# Patient Record
Sex: FEMALE | Race: WHITE | Employment: FULL TIME | URBAN - METROPOLITAN AREA
[De-identification: names, ages, dates, MRNs, and addresses within clinical notes are randomized per-mention and may not be internally consistent; named-entity substitution may affect disease eponyms.]

---

## 2017-01-12 ENCOUNTER — TELEPHONE (OUTPATIENT)
Dept: FAMILY MEDICINE CLINIC | Facility: CLINIC | Age: 42
End: 2017-01-12

## 2017-01-12 NOTE — TELEPHONE ENCOUNTER
Refill was sent yesterday,  Is there a problem with the refill? Pt only has 1 pill left. Please advise .

## 2017-01-13 ENCOUNTER — TELEPHONE (OUTPATIENT)
Dept: FAMILY MEDICINE CLINIC | Facility: CLINIC | Age: 42
End: 2017-01-13

## 2017-01-13 RX ORDER — OLMESARTAN MEDOXOMIL-HYDROCHLOROTHIAZIDE 20; 12.5 MG/1; MG/1
TABLET, FILM COATED ORAL
Qty: 30 TABLET | Refills: 0 | Status: SHIPPED | OUTPATIENT
Start: 2017-01-13 | End: 2017-01-13 | Stop reason: ALTCHOICE

## 2017-01-13 RX ORDER — IRBESARTAN AND HYDROCHLOROTHIAZIDE 150; 12.5 MG/1; MG/1
1 TABLET, FILM COATED ORAL DAILY
Qty: 30 TABLET | Refills: 0 | Status: SHIPPED | OUTPATIENT
Start: 2017-01-13 | End: 2017-02-10

## 2017-01-13 NOTE — TELEPHONE ENCOUNTER
Mack tried faxing 2 x, won't go thru. Would like us to confirm new script. Currently given trial supply of:  Benicar, 20/12.5 - is no longer covered by ins.   Alternative are:   Losartan/hct or Irbesartan/hct or Candesartan/hct - these are covered by

## 2017-01-13 NOTE — TELEPHONE ENCOUNTER
Per Dr. Anish Bruce, will change Rx to Irbesartan-Hydrochlorothiazide 150-12.5 MG Oral Tab    New Rx to pharmacy    Pt advised she needs to F/U in 30 days to see how well B/P is controlled with new medication    Pt understands and will call back to schedule ty

## 2017-01-13 NOTE — TELEPHONE ENCOUNTER
No future appointments.     LOV 10/16     LAST LAB 3/15    LAST RX   BENICAR HCT 20-12.5 MG Oral Tab (Discontinued) 30 tablet 0 8/9/2016       PROTOCOL  Hypertension Medications Protocol Failed1/11 7:54 PM   CMP or BMP in past 12 months        Patient was a

## 2017-01-16 RX ORDER — IRBESARTAN AND HYDROCHLOROTHIAZIDE 150; 12.5 MG/1; MG/1
TABLET, FILM COATED ORAL
Qty: 90 TABLET | Refills: 0 | OUTPATIENT
Start: 2017-01-16

## 2017-02-07 LAB
% SATURATION: 11 % (CALC) (ref 11–50)
ALBUMIN,SERUM: 4 G/DL (ref 3.6–5.1)
ALBUMIN/GLOBULIN RATIO: 1.4 (CALC) (ref 1–2.5)
ALBUMIN: 4.1 G/DL (ref 3.6–5.1)
ALKALINE PHOSPHATASE: 70 U/L (ref 33–115)
ALT: 19 U/L (ref 6–29)
AST: 18 U/L (ref 10–30)
BILIRUBIN, TOTAL: 0.5 MG/DL (ref 0.2–1.2)
BUN: 14 MG/DL (ref 7–25)
CALCIUM: 9.2 MG/DL (ref 8.6–10.2)
CARBON DIOXIDE: 26 MMOL/L (ref 20–31)
CHLORIDE: 105 MMOL/L (ref 98–110)
CHOL/HDLC RATIO: 4.6 (CALC)
CHOLESTEROL, TOTAL: 152 MG/DL (ref 125–200)
CREATININE: 0.58 MG/DL (ref 0.5–1.1)
DHEA SULFATE: 89 MCG/DL (ref 19–231)
EGFR IF AFRICN AM: 131 ML/MIN/1.73M2
EGFR IF NONAFRICN AM: 113 ML/MIN/1.73M2
GLOBULIN: 2.9 G/DL (CALC) (ref 1.9–3.7)
GLUCOSE: 92 MG/DL (ref 65–99)
HDL CHOLESTEROL: 33 MG/DL
HEMOGLOBIN A1C: 6 % OF TOTAL HGB
IRON BINDING CAPACITY: 412 MCG/DL (CALC) (ref 250–450)
IRON, TOTAL: 44 MCG/DL (ref 40–190)
LDL-CHOLESTEROL: 71 MG/DL (CALC)
NON-HDL CHOLESTEROL: 119 MG/DL (CALC)
POTASSIUM: 4.1 MMOL/L (ref 3.5–5.3)
PROTEIN, TOTAL: 7 G/DL (ref 6.1–8.1)
SEX HORMONE BINDING$GLOBULIN: 38 NMOL/L (ref 17–124)
SODIUM: 138 MMOL/L (ref 135–146)
TESTOSTERONE, FREE: 2.3 PG/ML (ref 0.2–5)
TESTOSTERONE, TOTAL,$/MS/MS: 22 NG/DL (ref 2–45)
TESTOSTERONE,BIOAVAILABLE: 4.3 NG/DL (ref 0.5–8.5)
TRIGLYCERIDES: 238 MG/DL
TSH: 1.85 MIU/L

## 2017-02-10 ENCOUNTER — OFFICE VISIT (OUTPATIENT)
Dept: FAMILY MEDICINE CLINIC | Facility: CLINIC | Age: 42
End: 2017-02-10

## 2017-02-10 VITALS — SYSTOLIC BLOOD PRESSURE: 122 MMHG | OXYGEN SATURATION: 98 % | HEART RATE: 103 BPM | DIASTOLIC BLOOD PRESSURE: 78 MMHG

## 2017-02-10 DIAGNOSIS — R73.03 PREDIABETES: ICD-10-CM

## 2017-02-10 DIAGNOSIS — I10 ESSENTIAL HYPERTENSION: ICD-10-CM

## 2017-02-10 DIAGNOSIS — J02.9 ACUTE PHARYNGITIS, UNSPECIFIED ETIOLOGY: Primary | ICD-10-CM

## 2017-02-10 LAB — CONTROL LINE PRESENT WITH A CLEAR BACKGROUND (YES/NO): YES YES/NO

## 2017-02-10 PROCEDURE — 99213 OFFICE O/P EST LOW 20 MIN: CPT | Performed by: FAMILY MEDICINE

## 2017-02-10 RX ORDER — IRBESARTAN AND HYDROCHLOROTHIAZIDE 150; 12.5 MG/1; MG/1
1 TABLET, FILM COATED ORAL DAILY
Qty: 90 TABLET | Refills: 1 | Status: SHIPPED | OUTPATIENT
Start: 2017-02-10 | End: 2017-08-21

## 2017-02-10 RX ORDER — NEOMYCIN SULFATE, POLYMYXIN B SULFATE AND DEXAMETHASONE 3.5; 10000; 1 MG/ML; [USP'U]/ML; MG/ML
SUSPENSION/ DROPS OPHTHALMIC
Refills: 1 | COMMUNITY
Start: 2017-01-05 | End: 2017-09-14 | Stop reason: ALTCHOICE

## 2017-02-10 NOTE — PATIENT INSTRUCTIONS
Strep test for sore throat. For sore throat, chloraseptic spray every 2-3 hours can be used for relief. You can gargle with warm salt water or with 81 mg tablet of aspirin dissolved in warm water.  Tylenol 650 mg every 4 to 6 hours or ibuprofen 200 to 400 m

## 2017-02-10 NOTE — PROGRESS NOTES
Agata Gregory IS A 35year old female HERE FOR Patient presents with:  Medication Request: refill  Lab Results: discuss results  Sore Throat: s/s since AM, flu-like s/s       History of present illness:     Awoke with right side of throat hurting, some s Ophthalmic Solution Place 1 drop into both eyes 4 (four) times daily. Disp: 5 mL Rfl: 1   Mometasone Furoate 0.1 % External Solution Apply 1 Application topically daily.  Disp: 60 mL Rfl: 1       Allergy:        Codeine                 Rash  Enbrel no edema. Test results: reviewed with patient.      Orders Only on 02/03/2017  CHOLESTEROL, TOTAL Value: 152(mg/dL)  Date: 02/03/2017   HDL CHOLESTEROL Value: 33(mg/dL)* Date: 02/03/2017   TRIGLYCERIDES Value: 238(mg/dL)* Date: 02/03/2017   LDL-CHOLESTER BINDING$GLOB* Value: 38(nmol/L)  Date: 02/03/2017   Jairo Salts Value: 4.0(g/dL)  Date: 02/03/2017           Assessment & Plan:   There are no diagnoses linked to this encounter. Patient Instructions   Omron brand BP cuffs are usually more accurate.

## 2017-04-05 ENCOUNTER — TELEPHONE (OUTPATIENT)
Dept: FAMILY MEDICINE CLINIC | Facility: CLINIC | Age: 42
End: 2017-04-05

## 2017-04-05 DIAGNOSIS — K76.0 NONALCOHOLIC FATTY LIVER DISEASE: Primary | ICD-10-CM

## 2017-04-05 NOTE — TELEPHONE ENCOUNTER
Needs Out of Network referral for annual liver F/U with Dr Deatrice Lesch consult in Media from 4/2016

## 2017-04-05 NOTE — TELEPHONE ENCOUNTER
[facility]        Cc: [facility] Referral Pool       Phone Number: [phone]                     .Reason for the order/referral:Follow Up Visit   PCP:  Pector   Refer to Provider: Abbey Cervantes

## 2017-04-14 NOTE — TELEPHONE ENCOUNTER
Call received from Maggie Hendricks at [facility] now has in network liver specialist  Dr. Damien Schneider    They will allow this OV on Monday only and pt will have to transition to new specialists      Telephone Information:   Mobile [phone]     Pt Lina Mckeon

## 2017-05-19 ENCOUNTER — TELEPHONE (OUTPATIENT)
Dept: FAMILY MEDICINE CLINIC | Facility: CLINIC | Age: 42
End: 2017-05-19

## 2017-05-19 DIAGNOSIS — K76.0 NON-ALCOHOLIC FATTY LIVER DISEASE: ICD-10-CM

## 2017-05-19 DIAGNOSIS — L40.9 GENERALIZED PSORIASIS: Primary | ICD-10-CM

## 2017-05-19 NOTE — TELEPHONE ENCOUNTER
Patient Name: Mark Diaz   : 71   Reason for the order/referral: f/u   PCP: Francesca Wellerr   Refer to Provider (first and last name): Dr. Rl Villa   Specialty: Derm   Patient Insurance: Payor: [facility] BL/LATIA / Plan: Jarred Sierra / Loli Mcfarland

## 2017-06-09 ENCOUNTER — TELEPHONE (OUTPATIENT)
Dept: FAMILY MEDICINE CLINIC | Facility: CLINIC | Age: 42
End: 2017-06-09

## 2017-06-29 ENCOUNTER — OFFICE VISIT (OUTPATIENT)
Dept: FAMILY MEDICINE CLINIC | Facility: CLINIC | Age: 42
End: 2017-06-29

## 2017-06-29 VITALS
SYSTOLIC BLOOD PRESSURE: 122 MMHG | HEART RATE: 95 BPM | OXYGEN SATURATION: 97 % | WEIGHT: 220 LBS | DIASTOLIC BLOOD PRESSURE: 84 MMHG | BODY MASS INDEX: 42 KG/M2 | RESPIRATION RATE: 18 BRPM

## 2017-06-29 DIAGNOSIS — N80.9 ENDOMETRIOSIS: ICD-10-CM

## 2017-06-29 DIAGNOSIS — N63.20 LEFT BREAST MASS: Primary | ICD-10-CM

## 2017-06-29 PROCEDURE — 99213 OFFICE O/P EST LOW 20 MIN: CPT | Performed by: FAMILY MEDICINE

## 2017-06-29 NOTE — PATIENT INSTRUCTIONS
Mammogram & breast ultrasound for left breast soreness. This is probably either a benign cyst, or less likely a bruise from some type of trauma. For endometriosis: follow up with Dr. Juan Haque.    Low-dose birth control pill 20 to 25 mcg would be an opt

## 2017-06-29 NOTE — PROGRESS NOTES
Ksenia Gregory IS A 38year old female HERE FOR Patient presents with:  Breast Lump: Left breast for 2 days  Breast Pain: per pt, size of an almond       History of present illness:     [place]-sized lump noted with tenderness L upper inner breast x 2 days Mometasone Furoate 0.1 % External Solution Apply 1 Application topically daily.  Disp: 60 mL Rfl: 1       Allergy:        Codeine                 Rash  Enbrel                  Rash  Seafood                 Swelling    Comment:Angioedema    Family history: was seen today for breast lump and breast pain. Diagnoses and all orders for this visit:    Left breast mass  Comments:  upper inner about 11:00  Orders:  -     [facility] DIAGNOSTIC BILATERAL (CPT=77066); Future  -     US BREAST LEFT LIMITED (QMT=26489);  Futu

## 2017-07-13 ENCOUNTER — HOSPITAL ENCOUNTER (OUTPATIENT)
Dept: MAMMOGRAPHY | Age: 42
Discharge: HOME | End: 2017-07-13
Attending: FAMILY MEDICINE
Payer: COMMERCIAL

## 2017-07-13 ENCOUNTER — HOSPITAL ENCOUNTER (OUTPATIENT)
Dept: ULTRASOUND IMAGING | Age: 42
Discharge: HOME | End: 2017-07-13
Attending: FAMILY MEDICINE
Payer: COMMERCIAL

## 2017-07-13 DIAGNOSIS — N63.20 LEFT BREAST MASS: ICD-10-CM

## 2017-07-13 PROCEDURE — 77062 BREAST TOMOSYNTHESIS BI: CPT | Performed by: FAMILY MEDICINE

## 2017-07-13 PROCEDURE — 76642 ULTRASOUND BREAST LIMITED: CPT | Performed by: FAMILY MEDICINE

## 2017-07-13 PROCEDURE — 77066 DX MAMMO INCL CAD BI: CPT | Performed by: FAMILY MEDICINE

## 2017-08-10 ENCOUNTER — TELEPHONE (OUTPATIENT)
Dept: FAMILY MEDICINE CLINIC | Facility: CLINIC | Age: 42
End: 2017-08-10

## 2017-08-10 ENCOUNTER — OFFICE VISIT (OUTPATIENT)
Dept: FAMILY MEDICINE CLINIC | Facility: CLINIC | Age: 42
End: 2017-08-10

## 2017-08-10 VITALS
SYSTOLIC BLOOD PRESSURE: 128 MMHG | TEMPERATURE: 99 F | RESPIRATION RATE: 12 BRPM | OXYGEN SATURATION: 96 % | HEART RATE: 80 BPM | DIASTOLIC BLOOD PRESSURE: 70 MMHG

## 2017-08-10 DIAGNOSIS — R10.9 FLANK PAIN: Primary | ICD-10-CM

## 2017-08-10 DIAGNOSIS — M62.838 MUSCLE SPASM OF LEFT SHOULDER: ICD-10-CM

## 2017-08-10 LAB
BILIRUBIN: 0
GLUCOSE (URINE DIPSTICK): 0 MG/DL
KETONES (URINE DIPSTICK): 0 MG/DL
LEUKOCYTES: 0
MULTISTIX LOT#: NORMAL NUMERIC
NITRITE, URINE: 0
OCCULT BLOOD: 0
PH, URINE: 6 (ref 4.5–8)
PROTEIN (URINE DIPSTICK): 0 MG/DL
SPECIFIC GRAVITY: 1.02 (ref 1–1.03)
UROBILINOGEN,SEMI-QN: 0.2 MG/DL (ref 0–1.9)

## 2017-08-10 PROCEDURE — 99213 OFFICE O/P EST LOW 20 MIN: CPT | Performed by: FAMILY MEDICINE

## 2017-08-10 PROCEDURE — 81003 URINALYSIS AUTO W/O SCOPE: CPT | Performed by: FAMILY MEDICINE

## 2017-08-10 RX ORDER — OMEGA-3 FATTY ACIDS/FISH OIL 300-1000MG
200 CAPSULE ORAL 2 TIMES DAILY PRN
COMMUNITY
End: 2020-02-21

## 2017-08-10 NOTE — PROGRESS NOTES
Aubree Gregory IS A 38year old female HERE FOR Patient presents with:  Flank Pain: left flank since yesterday AM-constant. did have a time with a break 6PM lat night till 0100, returned and constant since.  no other symptoms  Breathing Problem: is affecte Irbesartan-Hydrochlorothiazide 150-12.5 MG Oral Tab Take 1 tablet by mouth daily. Disp: 90 tablet Rfl: 1   Mometasone Furoate 0.1 % External Solution Apply 1 Application topically daily. Disp: 60 mL Rfl: 1   BIOTIN OR Take 1 capsule by mouth.  Disp:  Rfl: lower several ribs. Abdomen nontender, no percussion tenderness or apparent splenomegaly. Test results: UA normal.      Assessment & Plan:   Shari Lee was seen today for flank pain and breathing problem.     Diagnoses and all orders for this visit:

## 2017-08-10 NOTE — TELEPHONE ENCOUNTER
Patient has Left side pain  under rib cage. No cough afebrile. Started yesterday in the morning comes and goes. SOB with the pain but not emergency. Patient has no leg pain at all.    Future Appointments  Date Time Provider Fe Patel   8/10/2017

## 2017-08-10 NOTE — PATIENT INSTRUCTIONS
Ibuprofen 2-3 pills 3 times daily as needed for pain. The pain could be muscular. Heat can help. Salicylic acid containing products can help. (Kayy or similar)    famotidine 20 mg daily could help if you have any reflux symptoms.  (over the counter Pepci

## 2017-08-10 NOTE — TELEPHONE ENCOUNTER
Pt called and stated she has pain in left thigh. Scheduled Appt. Then at that time pt stated new problem - she is also having trouble breathing and has a 1pm dental appt and should she be going to the dentist if having difficulty breathing?   Said she i

## 2017-08-21 NOTE — TELEPHONE ENCOUNTER
**LEFT VOICE MAIL MESSAGE AT FRONT OFFICE**  Have not spoke to pt. Stated she drove 40 minutes from work and no one had called her to explain we had not filled her Refill. Thought she also had 90 tablets left and Mack is saying no. Pls call.

## 2017-08-22 RX ORDER — IRBESARTAN AND HYDROCHLOROTHIAZIDE 150; 12.5 MG/1; MG/1
TABLET, FILM COATED ORAL
Qty: 90 TABLET | Refills: 0 | OUTPATIENT
Start: 2017-08-22

## 2017-08-22 RX ORDER — IRBESARTAN AND HYDROCHLOROTHIAZIDE 150; 12.5 MG/1; MG/1
1 TABLET, FILM COATED ORAL DAILY
Qty: 30 TABLET | Refills: 0 | Status: SHIPPED | OUTPATIENT
Start: 2017-08-22 | End: 2017-09-09

## 2017-08-22 NOTE — TELEPHONE ENCOUNTER
Patient states she has 1 pill left. She thinks there may be a discrepancy between Dr Ming Barfield and Mack with how many she should have on file.   ~ Patient is aware we have 24-48 hours to refill.

## 2017-08-22 NOTE — TELEPHONE ENCOUNTER
No future appointments.     LOV 2/17 acute later    LAST LAB 2/17    LAST RX   Irbesartan-Hydrochlorothiazide 150-12.5 MG Oral Tab 90 tablet 1 2/10/2017         PROTOCOL  Hypertension Medications Protocol Passed8/22 8:40    Refilled 30 days needs appointmen

## 2017-08-22 NOTE — TELEPHONE ENCOUNTER
Pt recently seen for acute, should schedule for med refills/followup. Please schedule. We gave her 1 month of medication.

## 2017-09-09 ENCOUNTER — OFFICE VISIT (OUTPATIENT)
Dept: FAMILY MEDICINE CLINIC | Facility: CLINIC | Age: 42
End: 2017-09-09

## 2017-09-09 VITALS
HEART RATE: 93 BPM | TEMPERATURE: 98 F | SYSTOLIC BLOOD PRESSURE: 116 MMHG | WEIGHT: 219.63 LBS | RESPIRATION RATE: 16 BRPM | DIASTOLIC BLOOD PRESSURE: 80 MMHG | HEIGHT: 61 IN | BODY MASS INDEX: 41.47 KG/M2 | OXYGEN SATURATION: 97 %

## 2017-09-09 DIAGNOSIS — J98.01 ACUTE BRONCHOSPASM: Primary | ICD-10-CM

## 2017-09-09 DIAGNOSIS — E78.5 DYSLIPIDEMIA: ICD-10-CM

## 2017-09-09 DIAGNOSIS — R73.01 IMPAIRED FASTING GLUCOSE: ICD-10-CM

## 2017-09-09 DIAGNOSIS — I10 ESSENTIAL HYPERTENSION: ICD-10-CM

## 2017-09-09 PROBLEM — R06.00 DYSPNEA: Status: ACTIVE | Noted: 2017-09-09

## 2017-09-09 PROCEDURE — 99214 OFFICE O/P EST MOD 30 MIN: CPT | Performed by: FAMILY MEDICINE

## 2017-09-09 RX ORDER — IRBESARTAN AND HYDROCHLOROTHIAZIDE 150; 12.5 MG/1; MG/1
1 TABLET, FILM COATED ORAL DAILY
Qty: 90 TABLET | Refills: 1 | Status: SHIPPED | OUTPATIENT
Start: 2017-09-09 | End: 2018-02-23

## 2017-09-09 RX ORDER — OMEPRAZOLE 20 MG/1
20 CAPSULE, DELAYED RELEASE ORAL
Qty: 30 CAPSULE | Refills: 1 | Status: SHIPPED | OUTPATIENT
Start: 2017-09-09 | End: 2017-09-14

## 2017-09-09 RX ORDER — ALBUTEROL SULFATE 90 UG/1
2 AEROSOL, METERED RESPIRATORY (INHALATION) EVERY 4 HOURS PRN
Qty: 1 INHALER | Refills: 0 | Status: SHIPPED | OUTPATIENT
Start: 2017-09-09 | End: 2017-09-19 | Stop reason: ALTCHOICE

## 2017-09-09 NOTE — PROGRESS NOTES
Darrick Gregory IS A 38year old female HERE FOR Patient presents with:  Shortness Of Breath: SOB when going up and down stairs or any physical activity       History of present illness:     C/o pain in chest when drinking Crystal light.  Dyspnea on stairs Inhalation Aero Soln Inhale 2 puffs into the lungs every 4 (four) hours as needed for Wheezing. Disp: 1 Inhaler Rfl: 0   Irbesartan-Hydrochlorothiazide 150-12.5 MG Oral Tab Take 1 tablet by mouth daily.  Disp: 90 tablet Rfl: 1   Ibuprofen (ADVIL) 200 MG Ora similar symptoms. Took med for GERD at that time and the increased belching/burping improved after a month.      Exam:     /80 (BP Location: Left arm, Patient Position: Sitting, Cuff Size: large)   Pulse 93   Temp 98.4 °F (36.9 °C) (Oral)   Resp 16

## 2017-09-09 NOTE — PATIENT INSTRUCTIONS
Peak flow under 320 after Crystal light may indicate asthma. Call if you are getting readings under that and then fill albuterol inhaler rx. Omeprazole 20 mg (generic Prilosec) over the counter daily for 4 weeks. That would help for burping/belching.

## 2017-09-11 ENCOUNTER — APPOINTMENT (OUTPATIENT)
Dept: CT IMAGING | Facility: HOSPITAL | Age: 42
End: 2017-09-11
Attending: EMERGENCY MEDICINE
Payer: COMMERCIAL

## 2017-09-11 ENCOUNTER — HOSPITAL ENCOUNTER (EMERGENCY)
Facility: HOSPITAL | Age: 42
Discharge: HOME | End: 2017-09-11
Attending: EMERGENCY MEDICINE
Payer: COMMERCIAL

## 2017-09-11 ENCOUNTER — HOSPITAL ENCOUNTER (OUTPATIENT)
Age: 42
Discharge: ANOTHER HOSPITAL | End: 2017-09-11
Attending: FAMILY MEDICINE
Payer: COMMERCIAL

## 2017-09-11 ENCOUNTER — TELEPHONE (OUTPATIENT)
Dept: FAMILY MEDICINE CLINIC | Facility: CLINIC | Age: 42
End: 2017-09-11

## 2017-09-11 VITALS
OXYGEN SATURATION: 99 % | HEART RATE: 88 BPM | TEMPERATURE: 98 F | DIASTOLIC BLOOD PRESSURE: 79 MMHG | SYSTOLIC BLOOD PRESSURE: 148 MMHG | RESPIRATION RATE: 18 BRPM

## 2017-09-11 VITALS
DIASTOLIC BLOOD PRESSURE: 75 MMHG | RESPIRATION RATE: 18 BRPM | BODY MASS INDEX: 40.4 KG/M2 | SYSTOLIC BLOOD PRESSURE: 145 MMHG | WEIGHT: 219.56 LBS | HEART RATE: 86 BPM | OXYGEN SATURATION: 97 % | HEIGHT: 62 IN

## 2017-09-11 DIAGNOSIS — R94.31 ABNORMAL EKG: ICD-10-CM

## 2017-09-11 DIAGNOSIS — R06.00 DYSPNEA ON EXERTION: Primary | ICD-10-CM

## 2017-09-11 DIAGNOSIS — R42 DIZZINESS: ICD-10-CM

## 2017-09-11 LAB
APTT PPP: 27.7 SECONDS (ref 25–34)
BASOPHILS # BLD AUTO: 0.05 X10(3) UL (ref 0–0.1)
BASOPHILS NFR BLD AUTO: 0.7 %
BUN BLD-MCNC: 22 MG/DL (ref 8–20)
CALCIUM BLD-MCNC: 8.6 MG/DL (ref 8.3–10.3)
CHLORIDE: 108 MMOL/L (ref 101–111)
CO2: 24 MMOL/L (ref 22–32)
CREAT BLD-MCNC: 0.53 MG/DL (ref 0.55–1.02)
EOSINOPHIL # BLD AUTO: 0.15 X10(3) UL (ref 0–0.3)
EOSINOPHIL NFR BLD AUTO: 2.1 %
ERYTHROCYTE [DISTWIDTH] IN BLOOD BY AUTOMATED COUNT: 15.9 % (ref 11.5–16)
GLUCOSE BLD-MCNC: 89 MG/DL (ref 70–99)
HCT VFR BLD AUTO: 37 % (ref 34–50)
HGB BLD-MCNC: 11.7 G/DL (ref 12–16)
IMMATURE GRANULOCYTE COUNT: 0.02 X10(3) UL (ref 0–1)
IMMATURE GRANULOCYTE RATIO %: 0.3 %
INR BLD: 1.02 (ref 0.89–1.11)
LYMPHOCYTES # BLD AUTO: 1.54 X10(3) UL (ref 0.9–4)
LYMPHOCYTES NFR BLD AUTO: 21.2 %
MCH RBC QN AUTO: 25.1 PG (ref 27–33.2)
MCHC RBC AUTO-ENTMCNC: 31.6 G/DL (ref 31–37)
MCV RBC AUTO: 79.2 FL (ref 81–100)
MONOCYTES # BLD AUTO: 0.66 X10(3) UL (ref 0.1–0.6)
MONOCYTES NFR BLD AUTO: 9.1 %
NEUTROPHIL ABS PRELIM: 4.83 X10 (3) UL (ref 1.3–6.7)
NEUTROPHILS # BLD AUTO: 4.83 X10(3) UL (ref 1.3–6.7)
NEUTROPHILS NFR BLD AUTO: 66.6 %
PLATELET # BLD AUTO: 373 10(3)UL (ref 150–450)
POTASSIUM SERPL-SCNC: 4.3 MMOL/L (ref 3.6–5.1)
PSA SERPL DL<=0.01 NG/ML-MCNC: 13.4 SECONDS (ref 12–14.3)
RBC # BLD AUTO: 4.67 X10(6)UL (ref 3.8–5.1)
RED CELL DISTRIBUTION WIDTH-SD: 45.9 FL (ref 35.1–46.3)
SODIUM SERPL-SCNC: 142 MMOL/L (ref 136–144)
TROPONIN: <0.046 NG/ML (ref ?–0.05)
WBC # BLD AUTO: 7.3 X10(3) UL (ref 4–13)

## 2017-09-11 PROCEDURE — 99284 EMERGENCY DEPT VISIT MOD MDM: CPT

## 2017-09-11 PROCEDURE — 71275 CT ANGIOGRAPHY CHEST: CPT | Performed by: EMERGENCY MEDICINE

## 2017-09-11 PROCEDURE — 85610 PROTHROMBIN TIME: CPT | Performed by: EMERGENCY MEDICINE

## 2017-09-11 PROCEDURE — 85730 THROMBOPLASTIN TIME PARTIAL: CPT | Performed by: EMERGENCY MEDICINE

## 2017-09-11 PROCEDURE — 80048 BASIC METABOLIC PNL TOTAL CA: CPT | Performed by: EMERGENCY MEDICINE

## 2017-09-11 PROCEDURE — 93010 ELECTROCARDIOGRAM REPORT: CPT

## 2017-09-11 PROCEDURE — 85025 COMPLETE CBC W/AUTO DIFF WBC: CPT | Performed by: EMERGENCY MEDICINE

## 2017-09-11 PROCEDURE — 99205 OFFICE O/P NEW HI 60 MIN: CPT

## 2017-09-11 PROCEDURE — 99215 OFFICE O/P EST HI 40 MIN: CPT

## 2017-09-11 PROCEDURE — 93005 ELECTROCARDIOGRAM TRACING: CPT

## 2017-09-11 PROCEDURE — 84484 ASSAY OF TROPONIN QUANT: CPT | Performed by: EMERGENCY MEDICINE

## 2017-09-11 PROCEDURE — 36415 COLL VENOUS BLD VENIPUNCTURE: CPT

## 2017-09-11 RX ORDER — ASPIRIN 81 MG/1
324 TABLET, CHEWABLE ORAL ONCE
Status: COMPLETED | OUTPATIENT
Start: 2017-09-11 | End: 2017-09-11

## 2017-09-11 RX ORDER — OMEPRAZOLE 20 MG/1
CAPSULE, DELAYED RELEASE ORAL
Qty: 90 CAPSULE | Refills: 1 | OUTPATIENT
Start: 2017-09-11

## 2017-09-11 NOTE — TELEPHONE ENCOUNTER
Patient called back - insisting to speak with someone because of how she feels. Transferred to triage line.

## 2017-09-11 NOTE — ED INITIAL ASSESSMENT (HPI)
Pt comes from immediate care c/o TRIPP and chest pain. To ED for further workup. 12 lead EKG obtained at immediate care.

## 2017-09-11 NOTE — TELEPHONE ENCOUNTER
**LEFT VM AT FRONT OFFICE**    Pt stated she has been feeling \"light headed and kind of foggy, unsure if it is her BP medication\". Asked to speak to Dr Ashley Gotti. Was just here Saturday afternoon.

## 2017-09-11 NOTE — ED NOTES
Pt insists on driving herself to the Ed. Instructed to remain NPO until cleared by Ed. Instructed to go directly to Ed. Pt verbalized understanding.

## 2017-09-11 NOTE — TELEPHONE ENCOUNTER
Spoke to patient Says she is not feeling well. Dizzy Did not get any new RX. Has been on BP for a while. Will be going to IC for an eval. Gave address of IC.

## 2017-09-11 NOTE — ED INITIAL ASSESSMENT (HPI)
Pt states she has been feeling dizzy for 2 days. She feels like she might pass out. Denies feeling the room spin. Pt also reports sob going up stairs. Saw her pmd on Saturday and was prescribed inhaler which she has not yet filled.   Denies nausea, vomi

## 2017-09-11 NOTE — ED PROVIDER NOTES
Patient Seen in: [facility] Emergency Department    History   Patient presents with:  Dyspnea TRIPP SOB (respiratory)    Stated Complaint:     HPI    Jena Isidro is a 78-year-old female presenting to the emergency department for dyspnea and abnormal EKG Smokeless tobacco: Never Used                      Alcohol use: No                Review of Systems    Positive for stated complaint:   Other systems are as noted in HPI. Constitutional and vital signs reviewed.       All other s limits   TROPONIN I - Normal   PTT, ACTIVATED - Normal    Narrative: The aPTT Heparin Therapeutic Range is approximately 65- 104 seconds. The therapeutic range has been validated against 0.3-0.7 heparin anti-Xa units/mL.      PROTHROMBIN TIME (PT) - Nor

## 2017-09-12 ENCOUNTER — TELEPHONE (OUTPATIENT)
Dept: FAMILY MEDICINE CLINIC | Facility: CLINIC | Age: 42
End: 2017-09-12

## 2017-09-12 DIAGNOSIS — R07.9 CHEST PAIN, UNSPECIFIED TYPE: ICD-10-CM

## 2017-09-12 DIAGNOSIS — R06.02 SHORTNESS OF BREATH: Primary | ICD-10-CM

## 2017-09-12 DIAGNOSIS — R94.31 ABNORMAL EKG: ICD-10-CM

## 2017-09-12 NOTE — TELEPHONE ENCOUNTER
Spoke to patient about her dizziness. She says she feels foggy no room spinning lightheaded and foggy. Jarret Mena M.D.   Cardiovascular Disease  [address] [address]  [address]  [place]

## 2017-09-12 NOTE — TELEPHONE ENCOUNTER
ER last night. Pt called to get Referral for Echo & Neurology from Dr. Ming Barfield. Offered an appt tomorrow and pt declined. Pt states she is still feeling very dizzy today. Asked for an Appt Friday.     [facility] so looks like she needs to come in for Ref

## 2017-09-12 NOTE — TELEPHONE ENCOUNTER
Pt was in ER yesterday with persistent dyspnea on exertion and some EKG changes. Saw me for shortness of breath Saturday, I thought it might be asthmatic reaction to aspartame, she gets it with Crystal Light.  ER recommends followup for the dyspnea/EKG rogers

## 2017-09-12 NOTE — TELEPHONE ENCOUNTER
Patient will see Katya Grewal first then f/u with PCP. Nothing about Neuro she will f/u  first with PCP.

## 2017-09-12 NOTE — TELEPHONE ENCOUNTER
\"foggy\" is a really vague symptom; I assume she doesn't feel well, but I don't think neurology will find much without more localized symptoms like headache, trouble walking, talking, coordination, weakness or numbness/tingling on one side of body.  Ferd Holstein

## 2017-09-13 ENCOUNTER — TELEPHONE (OUTPATIENT)
Dept: FAMILY MEDICINE CLINIC | Facility: CLINIC | Age: 42
End: 2017-09-13

## 2017-09-13 ENCOUNTER — TELEPHONE (OUTPATIENT)
Dept: NEUROLOGY | Facility: CLINIC | Age: 42
End: 2017-09-13

## 2017-09-13 DIAGNOSIS — R42 MULTISENSORY DIZZINESS: Primary | ICD-10-CM

## 2017-09-13 NOTE — TELEPHONE ENCOUNTER
Mallory Valente MD          9/12/17 3:25 PM   Note      \"foggy\" is a really vague symptom; I assume she doesn't feel well, but I don't think neurology will find much without more localized symptoms like headache, trouble walking, talking, coordination

## 2017-09-13 NOTE — TELEPHONE ENCOUNTER
Spoke with Dr. Kg Baeza. She is on call this week, and unable to see patients. She states patient can be added on next week. Thursday at 8:20am is the only available time slot to add on to her schedule.   We could also place patient on waiting list for a

## 2017-09-13 NOTE — TELEPHONE ENCOUNTER
Patient asks to speak with Dr Elle Lyn or her nurse in regards to MRI. She states she was at work and had to leave due to extreme dizziness.

## 2017-09-13 NOTE — TELEPHONE ENCOUNTER
Future Appointments  Date Time Provider Fe Amanda   9/14/2017 1:00 PM Cindi Boyd MD [facility] [address]

## 2017-09-14 ENCOUNTER — OFFICE VISIT (OUTPATIENT)
Dept: NEUROLOGY | Facility: CLINIC | Age: 42
End: 2017-09-14

## 2017-09-14 VITALS
DIASTOLIC BLOOD PRESSURE: 88 MMHG | HEART RATE: 78 BPM | SYSTOLIC BLOOD PRESSURE: 122 MMHG | RESPIRATION RATE: 16 BRPM | WEIGHT: 219 LBS | BODY MASS INDEX: 40 KG/M2

## 2017-09-14 DIAGNOSIS — R42 DIZZINESS: Primary | ICD-10-CM

## 2017-09-14 DIAGNOSIS — R41.82 ALTERED MENTAL STATUS, UNSPECIFIED ALTERED MENTAL STATUS TYPE: ICD-10-CM

## 2017-09-14 DIAGNOSIS — R41.89 COGNITIVE CHANGE: ICD-10-CM

## 2017-09-14 PROCEDURE — 99244 OFF/OP CNSLTJ NEW/EST MOD 40: CPT | Performed by: OTHER

## 2017-09-14 NOTE — PROGRESS NOTES
[facility]    Date of consult: 9/14/2017    CC: dizziness, cognitive change    HPI: Korin Prabhakar is a 38year old female with past medical history as listed below presents here for initial evaluation of dizziness and cognitive disorder, not elsewhere classified     consider psych consult to r/o mood disorder   • Endometriosis, site unspecified    • External hemorrhoids without mention of complication    • Gastritis     mild   • JOSE EDUARDO(003.5)    • Helicobacter pylori (H. pylori normal motility, no atrophy  Motor strength: 5/5 all extremities  Tone: normal  DTRs: 2+ symmetric  Plantar response: bilateral flexor  Coordination: Normal FTN  Sensory: symmetric  Gait: nl  Neck: supple    Data Reviewed on 9/14/2017  Notes Reviewed on 9/

## 2017-09-14 NOTE — TELEPHONE ENCOUNTER
OK to request they call me with results ASAP on Friday, I don't think moving it up to Thursday is necessary. We can try to work her in Thursday or Friday to reevaluate her symptoms I know she feels bad.  Her symptoms are hard for her to describe and overlap

## 2017-09-14 NOTE — PATIENT INSTRUCTIONS
Refill policies:    • Allow 2-3 business days for refills; controlled substances may take longer.   • Contact your pharmacy at least 5 days prior to running out of medication and have them send an electronic request or submit request through the [facility] have a procedure or additional testing performed. [facility]) will contact your insurance carrier to obtain pre-certification or prior authorization.     Unfortunately, [facility] has seen an increase in denial of payment even though the p

## 2017-09-15 ENCOUNTER — HOSPITAL ENCOUNTER (OUTPATIENT)
Dept: CV DIAGNOSTICS | Age: 42
Discharge: HOME | End: 2017-09-15
Attending: FAMILY MEDICINE
Payer: COMMERCIAL

## 2017-09-15 DIAGNOSIS — R94.31 ABNORMAL EKG: ICD-10-CM

## 2017-09-15 DIAGNOSIS — R06.02 SHORTNESS OF BREATH: ICD-10-CM

## 2017-09-15 DIAGNOSIS — R07.9 CHEST PAIN, UNSPECIFIED TYPE: ICD-10-CM

## 2017-09-15 PROCEDURE — 93306 TTE W/DOPPLER COMPLETE: CPT | Performed by: FAMILY MEDICINE

## 2017-09-16 ENCOUNTER — TELEPHONE (OUTPATIENT)
Dept: NEUROLOGY | Facility: CLINIC | Age: 42
End: 2017-09-16

## 2017-09-16 ENCOUNTER — HOSPITAL ENCOUNTER (OUTPATIENT)
Dept: MRI IMAGING | Facility: HOSPITAL | Age: 42
Discharge: HOME | End: 2017-09-16
Attending: Other
Payer: COMMERCIAL

## 2017-09-16 DIAGNOSIS — R41.89 COGNITIVE CHANGE: ICD-10-CM

## 2017-09-16 DIAGNOSIS — R42 DIZZINESS: ICD-10-CM

## 2017-09-16 PROCEDURE — 70551 MRI BRAIN STEM W/O DYE: CPT | Performed by: OTHER

## 2017-09-16 NOTE — TELEPHONE ENCOUNTER
I was contacted by radiology about a recent MRI. Her MRI imaging was essentially normal. There are 2 very small T2 hyperintensities in the posterior frontal lobe which are non specific. I discussed this with the pt. on the phone.

## 2017-09-18 ENCOUNTER — TELEPHONE (OUTPATIENT)
Dept: FAMILY MEDICINE CLINIC | Facility: CLINIC | Age: 42
End: 2017-09-18

## 2017-09-18 DIAGNOSIS — R41.89 COGNITIVE CHANGES: ICD-10-CM

## 2017-09-18 DIAGNOSIS — R42 DIZZINESS: Primary | ICD-10-CM

## 2017-09-18 NOTE — TELEPHONE ENCOUNTER
We can address all of this at [address] tomorrow    Updated neuro order placed    Number of Visits: 3  Specialty: DERMATOLOGY   Referral Information     Referred to  [address] [place] [address] [id number]  [phone]          Estrellita Daniel

## 2017-09-18 NOTE — TELEPHONE ENCOUNTER
Pt called with numerous questions/ideas. 1)  Needs to find out who Dermatology Referral is - which I gave her - and then, how many visits does she have with this doctor? 2)  Needs to have additional visits added with Dr. Segundo Carey.     3)   Scheduled an

## 2017-09-19 ENCOUNTER — OFFICE VISIT (OUTPATIENT)
Dept: FAMILY MEDICINE CLINIC | Facility: CLINIC | Age: 42
End: 2017-09-19

## 2017-09-19 VITALS
OXYGEN SATURATION: 97 % | HEIGHT: 61 IN | SYSTOLIC BLOOD PRESSURE: 122 MMHG | HEART RATE: 96 BPM | DIASTOLIC BLOOD PRESSURE: 78 MMHG

## 2017-09-19 DIAGNOSIS — R42 DIZZINESS: ICD-10-CM

## 2017-09-19 DIAGNOSIS — L40.9 PSORIASIS: ICD-10-CM

## 2017-09-19 DIAGNOSIS — I10 ESSENTIAL HYPERTENSION: ICD-10-CM

## 2017-09-19 DIAGNOSIS — R79.89 LFT ELEVATION: ICD-10-CM

## 2017-09-19 DIAGNOSIS — R25.3 MUSCLE TWITCHING: ICD-10-CM

## 2017-09-19 DIAGNOSIS — R06.00 DYSPNEA ON EXERTION: Primary | ICD-10-CM

## 2017-09-19 DIAGNOSIS — R07.89 CHEST PRESSURE: ICD-10-CM

## 2017-09-19 DIAGNOSIS — R14.2 BELCHING: ICD-10-CM

## 2017-09-19 PROCEDURE — 99213 OFFICE O/P EST LOW 20 MIN: CPT | Performed by: FAMILY MEDICINE

## 2017-09-19 NOTE — PATIENT INSTRUCTIONS
Referrals:   Dr. Joycelyn Lam for fogginess and muscle twitch  PFT (lung function test) for shortness of breath and to rule out asthma.   Dr. Nikky Flores, cardiology, for chest pressure and shortness of breath, to review echo and see if any repeat stress test (to assess

## 2017-09-19 NOTE — PROGRESS NOTES
Eduardo Gregory IS A 38year old female HERE FOR Patient presents with:  Referral: ENT and Neurologist referral  Tics: Left thumb twitches for 1 week       History of present illness:     Pt had MRI done and EEG is scheduled, saw Dr. Keith Retana, MRI was favian hx essential HTN, benign   • Unspecified pruritic disorder        Past Surgical History:  2005: CHOLECYSTECTOMY      Comment: laparoscopic, Dr. Afshan Schmidt: LAPAROSCOPY PROCEDURE UNLISTED      Comment: Laparoscopy    Current medications:       Current Outp Social History Narrative   None on file       Review of systems:     No new symptoms aside from above. No tinnitus, ear pain or loss of hearing. Exam:     /78   Pulse 96   Ht 61\"   LMP 09/09/2017 (Exact Date)   SpO2 97%   Breastfeeding?  No

## 2017-09-22 ENCOUNTER — NURSE ONLY (OUTPATIENT)
Dept: ELECTROPHYSIOLOGY | Facility: HOSPITAL | Age: 42
End: 2017-09-22
Attending: Other
Payer: COMMERCIAL

## 2017-09-22 DIAGNOSIS — R42 DIZZINESS: ICD-10-CM

## 2017-09-22 PROCEDURE — 95816 EEG AWAKE AND DROWSY: CPT | Performed by: OTHER

## 2017-09-22 NOTE — PROCEDURES
Date of Procedure: 9/22/2017    Procedure: EEG (ELECTROENCEPHALOGRAM)     DX: DIZZINESS  HX: PT IS A 43 Y/O FEMALE THAT PRESENTS FOR EVALUATION OF EPISODES OF DIZZINESS. PT STATES ONLY OCCURS WHEN WALKING/STANDING AND STATES FEELS FOGGY DURING EVENTS.  PT S

## 2017-09-25 ENCOUNTER — MED REC SCAN ONLY (OUTPATIENT)
Dept: FAMILY MEDICINE CLINIC | Facility: CLINIC | Age: 42
End: 2017-09-25

## 2017-10-02 ENCOUNTER — OFFICE VISIT (OUTPATIENT)
Dept: NEUROLOGY | Facility: CLINIC | Age: 42
End: 2017-10-02

## 2017-10-02 VITALS
DIASTOLIC BLOOD PRESSURE: 70 MMHG | SYSTOLIC BLOOD PRESSURE: 100 MMHG | WEIGHT: 217 LBS | BODY MASS INDEX: 41 KG/M2 | HEART RATE: 88 BPM

## 2017-10-02 DIAGNOSIS — M54.2 NECK PAIN: ICD-10-CM

## 2017-10-02 DIAGNOSIS — R42 DIZZINESS: Primary | ICD-10-CM

## 2017-10-02 PROCEDURE — 99215 OFFICE O/P EST HI 40 MIN: CPT | Performed by: OTHER

## 2017-10-02 NOTE — PATIENT INSTRUCTIONS
Refill policies:    • Allow 2-3 business days for refills; controlled substances may take longer.   • Contact your pharmacy at least 5 days prior to running out of medication and have them send an electronic request or submit request through the [facility] have a procedure or additional testing performed. [facility]) will contact your insurance carrier to obtain pre-certification or prior authorization.     Unfortunately, [facility] has seen an increase in denial of payment even though the p

## 2017-10-02 NOTE — PROGRESS NOTES
[facility] Neurology outpatient progress note  Date of service: 10/2/2017    Patient here to discuss MRI and EEG results. She states she was dizzy all week when she stayed home 9/14/17- 9/23/17.  When she went back to work on Monday 9/25/17 patient noticed she was disorder   • Endometriosis, site unspecified    • External hemorrhoids without mention of complication    • Gastritis     mild   • UCTIGAQN(583.1)    • Helicobacter pylori (H. pylori) 3/2010    H. pylori (+)   • High-density lipoprotein deficiency     \"lo consult to rule out ENT issue  Referral to Physical Therapy and Rehab re: neck pain and vertigo  See orders and medications filed with this encounter. The patient indicates understanding of these issues and agrees with the plan.   Discussed with patient in

## 2017-10-03 ENCOUNTER — TELEPHONE (OUTPATIENT)
Dept: NEUROLOGY | Facility: CLINIC | Age: 42
End: 2017-10-03

## 2017-10-06 ENCOUNTER — TELEPHONE (OUTPATIENT)
Dept: NEUROLOGY | Facility: CLINIC | Age: 42
End: 2017-10-06

## 2017-10-06 DIAGNOSIS — M54.2 NECK PAIN: ICD-10-CM

## 2017-10-06 DIAGNOSIS — R42 DIZZINESS: Primary | ICD-10-CM

## 2017-10-06 NOTE — TELEPHONE ENCOUNTER
Pt is [facility] HMO which required clinical notes added to referral before they will approve (we know this from past issues with referrals)    If you can add clinical info this should go though faster      Please contact pt to let her know where you are at in th

## 2017-10-06 NOTE — TELEPHONE ENCOUNTER
Spoke with Cele Ards @ [facility]. She is requesting for a referral to be entered for patient. Noted PT ordered 10/2/17. Reordered PT. PT Referral pending.

## 2017-10-06 NOTE — TELEPHONE ENCOUNTER
Patient called back. Given PT update below. She verbalized understanding . She will call [facility]. If patient returns call, [facility] number to call is [phone].

## 2017-10-06 NOTE — TELEPHONE ENCOUNTER
Patient called back stating she was advised by [facility] to have provider indicate PT referral as \"URGENT\"    Spoke with PA and was advised \"Urgent\" is usually for life and death situations and patient is to be advised that PT may be denied.      Spoke with erik

## 2017-10-06 NOTE — TELEPHONE ENCOUNTER
Pt stated she spoke to [facility] from our office when she pressed key 3\" after dialing our number. Does not understand why we have not gotten back to her. Explained that must be [facility] Referral.      Referral is not in yet for PT.     Pt upset as she is

## 2017-10-06 NOTE — TELEPHONE ENCOUNTER
Per PA clinical notes attached to PT referral. PT pending review. Patient scheduled for PT visit 10/9/17 at 3 pm.     Attempted to reach patient. Left a detailed message on patient's voicemail (ok per HIPPA) with PT update.

## 2017-10-07 ENCOUNTER — TELEPHONE (OUTPATIENT)
Dept: NEUROLOGY | Facility: CLINIC | Age: 42
End: 2017-10-07

## 2017-10-07 RX ORDER — METHYLPREDNISOLONE 4 MG/1
TABLET ORAL
Qty: 1 PACKAGE | Refills: 0 | Status: SHIPPED | OUTPATIENT
Start: 2017-10-07 | End: 2018-02-23

## 2017-10-07 RX ORDER — CYCLOBENZAPRINE HCL 10 MG
10 TABLET ORAL 2 TIMES DAILY
Qty: 20 TABLET | Refills: 0 | Status: SHIPPED | OUTPATIENT
Start: 2017-10-07 | End: 2017-10-13

## 2017-10-07 NOTE — TELEPHONE ENCOUNTER
Complaining of neck pain after EEG - rolled a towel in her neck and since then has been hurting  No radicular component and no myelopathic symptoms    MDP and Flexeril sent    Lacey Jernigan

## 2017-10-09 ENCOUNTER — HOSPITAL ENCOUNTER (OUTPATIENT)
Dept: PHYSICAL THERAPY | Facility: HOSPITAL | Age: 42
Setting detail: THERAPIES SERIES
Discharge: HOME | End: 2017-10-09
Attending: Other
Payer: COMMERCIAL

## 2017-10-09 DIAGNOSIS — R42 DIZZINESS: ICD-10-CM

## 2017-10-09 DIAGNOSIS — M54.2 NECK PAIN: ICD-10-CM

## 2017-10-09 PROCEDURE — 97162 PT EVAL MOD COMPLEX 30 MIN: CPT

## 2017-10-09 PROCEDURE — 97140 MANUAL THERAPY 1/> REGIONS: CPT

## 2017-10-09 NOTE — PROGRESS NOTES
VESTIBULAR AND CERVICAL EVALUATION:   Referring Physician: Dr. Sanket Greer  Diagnosis: Dizziness,  Neck pain         Date of Service: 10/9/2017     PATIENT SUMMARY   Agata Knightcheri EverettJacki is a 38year old y/o female who presents to therapy today with complaint significantly improved her symptoms, but they are still present and limiting to her. She has returned to work as a , but pain makes it difficult for her to turn her head and sleep.  She has a history of cervical pain that was improved with Depressive disorder, not elsewhere classified     consider psych consult to r/o mood disorder   • Endometriosis, site unspecified    • External hemorrhoids without mention of complication    • Gastritis     mild   • DEEPALIZTPLAURENCE(794.9)    • Helicobacter pylori cervical mobility and dec cervical pain to improve ability to perform ADLs/IADLs, also to address insidious dizziness to improve ability to walk and forward bend without onset of symptoms.      Precautions:  None  OBJECTIVE:   **Please note, full vestibular 4/5*, L 4-/5*  Mid trap: R 4/5*, L 4-/5*  Lats: R 4/5*, L 3+/5*  Low trap: R 4/5*, L 3+/5*   * = with pain    Flexibility:   UE/Scapular   Upper Trap: R MIN inc mm tension; L MOD inc mm tension with TTP  Levator Scap: R MIN inc mm tension; L MOD inc mm ten update goals to include those for dizziness as needed when formally assessed in further sessions. **    Frequency / Duration: Patient will be seen for 2 x/week or a total of 10 visits over a 90 day period.  Treatment will include: Neuromuscular Re-education;

## 2017-10-11 ENCOUNTER — HOSPITAL ENCOUNTER (OUTPATIENT)
Dept: PHYSICAL THERAPY | Facility: HOSPITAL | Age: 42
Setting detail: THERAPIES SERIES
End: 2017-10-11
Attending: Other
Payer: COMMERCIAL

## 2017-10-13 ENCOUNTER — TELEPHONE (OUTPATIENT)
Dept: NEUROLOGY | Facility: CLINIC | Age: 42
End: 2017-10-13

## 2017-10-13 RX ORDER — CYCLOBENZAPRINE HCL 10 MG
10 TABLET ORAL 2 TIMES DAILY
Qty: 20 TABLET | Refills: 0 | Status: SHIPPED | OUTPATIENT
Start: 2017-10-13 | End: 2018-02-23

## 2017-10-13 NOTE — TELEPHONE ENCOUNTER
C/o neck pain. No radicular symptoms or myelopathic symptoms reported. Requesting for Flexeril refill. Ok to give her some flexeril. Agree too soon for Medrol dose pack just finished. Call the clinic back on Monday.

## 2017-10-13 NOTE — TELEPHONE ENCOUNTER
Patient states she does still have some Flexeril left but she is having to take it for her neck pain twice a day along with 2 Advil to get any relief. States pain started when she was positioned for EEG. Able to turn head but it is painful.  Patient is in P

## 2017-10-16 ENCOUNTER — APPOINTMENT (OUTPATIENT)
Dept: PHYSICAL THERAPY | Facility: HOSPITAL | Age: 42
End: 2017-10-16
Attending: Other
Payer: COMMERCIAL

## 2017-10-16 NOTE — TELEPHONE ENCOUNTER
JEAN to discuss Dr. Cosmo Lennox message. Per Dr. Indiana Batres: did not speak with patient, did sent over Rx for Flexeril.

## 2017-10-18 ENCOUNTER — APPOINTMENT (OUTPATIENT)
Dept: PHYSICAL THERAPY | Facility: HOSPITAL | Age: 42
End: 2017-10-18
Attending: Other
Payer: COMMERCIAL

## 2017-10-20 ENCOUNTER — OFFICE VISIT (OUTPATIENT)
Dept: PHYSICAL THERAPY | Age: 42
End: 2017-10-20
Attending: Other
Payer: COMMERCIAL

## 2017-10-20 PROCEDURE — 97140 MANUAL THERAPY 1/> REGIONS: CPT

## 2017-10-20 PROCEDURE — 97110 THERAPEUTIC EXERCISES: CPT

## 2017-10-20 NOTE — PROGRESS NOTES
Dx: Dizziness (R42)  Neck pain (M54.2         Authorized # of Visits:  8         Next MD visit: none scheduled  Fall Risk: standard         Precautions: n/a             Subjective: No new problems. Reports that she is still having a lot of neck pain.  Ermias De Los Santos

## 2017-10-23 NOTE — TELEPHONE ENCOUNTER
Relayed Dr. Merrick Christensen message. States she is doing better, has not picked up refill yet but will do so if needed.

## 2017-10-25 ENCOUNTER — APPOINTMENT (OUTPATIENT)
Dept: PHYSICAL THERAPY | Facility: HOSPITAL | Age: 42
End: 2017-10-25
Attending: Other
Payer: COMMERCIAL

## 2017-10-27 ENCOUNTER — OFFICE VISIT (OUTPATIENT)
Dept: PHYSICAL THERAPY | Age: 42
End: 2017-10-27
Attending: Other
Payer: COMMERCIAL

## 2017-10-27 PROCEDURE — 97110 THERAPEUTIC EXERCISES: CPT

## 2017-10-27 PROCEDURE — 97140 MANUAL THERAPY 1/> REGIONS: CPT

## 2017-10-27 NOTE — PROGRESS NOTES
Dx: Dizziness (R42)  Neck pain (M54.2         Authorized # of Visits:  8         Next MD visit: none scheduled  Fall Risk: standard         Precautions: n/a             Subjective: No new problems.   Reports that the pain is better and not having pain as mu min

## 2017-10-30 ENCOUNTER — APPOINTMENT (OUTPATIENT)
Dept: PHYSICAL THERAPY | Facility: HOSPITAL | Age: 42
End: 2017-10-30
Attending: Other
Payer: COMMERCIAL

## 2017-11-01 ENCOUNTER — APPOINTMENT (OUTPATIENT)
Dept: PHYSICAL THERAPY | Facility: HOSPITAL | Age: 42
End: 2017-11-01
Attending: Other
Payer: COMMERCIAL

## 2017-11-06 ENCOUNTER — APPOINTMENT (OUTPATIENT)
Dept: PHYSICAL THERAPY | Facility: HOSPITAL | Age: 42
End: 2017-11-06
Attending: Other
Payer: COMMERCIAL

## 2017-11-17 ENCOUNTER — OFFICE VISIT (OUTPATIENT)
Dept: PHYSICAL THERAPY | Age: 42
End: 2017-11-17
Attending: Other
Payer: COMMERCIAL

## 2017-11-17 PROCEDURE — 97110 THERAPEUTIC EXERCISES: CPT

## 2017-11-17 PROCEDURE — 97140 MANUAL THERAPY 1/> REGIONS: CPT

## 2017-11-17 NOTE — PROGRESS NOTES
Dx: Dizziness (R42)  Neck pain (M54.2         Authorized # of Visits:  8         Next MD visit: none scheduled  Fall Risk: standard         Precautions: n/a             Subjective: No new problems.   Reports that she has been consistent with HEP and pain ha posture Prone: mid  thoracic spine (T4-6) prone PA thrust    Prone: mid  thoracic spine (T4-6) prone PA thrust        Seated: SNAG C2 left rotation x10  Seated: SNAG C2 left/Right rotation 2x10 ea                                               Charges: Ex 1

## 2017-11-24 ENCOUNTER — APPOINTMENT (OUTPATIENT)
Dept: PHYSICAL THERAPY | Age: 42
End: 2017-11-24
Attending: Other
Payer: COMMERCIAL

## 2017-12-01 ENCOUNTER — APPOINTMENT (OUTPATIENT)
Dept: PHYSICAL THERAPY | Age: 42
End: 2017-12-01
Attending: Other
Payer: COMMERCIAL

## 2017-12-08 ENCOUNTER — APPOINTMENT (OUTPATIENT)
Dept: PHYSICAL THERAPY | Age: 42
End: 2017-12-08
Attending: Other
Payer: COMMERCIAL

## 2017-12-15 ENCOUNTER — APPOINTMENT (OUTPATIENT)
Dept: PHYSICAL THERAPY | Age: 42
End: 2017-12-15
Attending: Other
Payer: COMMERCIAL

## 2018-02-20 ENCOUNTER — TELEPHONE (OUTPATIENT)
Dept: FAMILY MEDICINE CLINIC | Facility: CLINIC | Age: 43
End: 2018-02-20

## 2018-02-20 NOTE — TELEPHONE ENCOUNTER
Last pap? Notes Recorded by Kapil Carcamo MD on 1/12/2015 at 9:14 AM  Normal pap, negative hpv.  Repeat pap and HPV 3 years.  Follow up for exam 1 year    Spoke to patient she c/o possible UTI s/s symptoms. Feels pressure.      Future Appointments

## 2018-02-20 NOTE — TELEPHONE ENCOUNTER
Has \"concerns\" and is requesting to come in Friday for a pap ONLY. Scheduled Physical in March. Pt declined to explain why but would only schedule Physical in March and wants Pap separately. Transferred to Triage . Pls call.

## 2018-02-23 ENCOUNTER — OFFICE VISIT (OUTPATIENT)
Dept: FAMILY MEDICINE CLINIC | Facility: CLINIC | Age: 43
End: 2018-02-23

## 2018-02-23 VITALS
OXYGEN SATURATION: 95 % | HEART RATE: 88 BPM | TEMPERATURE: 98 F | RESPIRATION RATE: 17 BRPM | WEIGHT: 220 LBS | SYSTOLIC BLOOD PRESSURE: 130 MMHG | DIASTOLIC BLOOD PRESSURE: 86 MMHG | HEIGHT: 61 IN | BODY MASS INDEX: 41.54 KG/M2

## 2018-02-23 DIAGNOSIS — Z13.29 SCREENING FOR THYROID DISORDER: ICD-10-CM

## 2018-02-23 DIAGNOSIS — Z11.3 SCREEN FOR STD (SEXUALLY TRANSMITTED DISEASE): ICD-10-CM

## 2018-02-23 DIAGNOSIS — R10.2 PELVIC PAIN IN FEMALE: Primary | ICD-10-CM

## 2018-02-23 DIAGNOSIS — I10 ESSENTIAL HYPERTENSION: ICD-10-CM

## 2018-02-23 DIAGNOSIS — N80.9 ENDOMETRIOSIS: ICD-10-CM

## 2018-02-23 DIAGNOSIS — Z13.0 SCREENING FOR DEFICIENCY ANEMIA: ICD-10-CM

## 2018-02-23 DIAGNOSIS — Z13.1 SCREENING FOR DIABETES MELLITUS: ICD-10-CM

## 2018-02-23 DIAGNOSIS — L68.0 HIRSUTISM: ICD-10-CM

## 2018-02-23 DIAGNOSIS — R73.01 IMPAIRED FASTING GLUCOSE: ICD-10-CM

## 2018-02-23 DIAGNOSIS — Z13.220 SCREENING, LIPID: ICD-10-CM

## 2018-02-23 LAB
BILIRUBIN: NEGATIVE
GLUCOSE (URINE DIPSTICK): NEGATIVE MG/DL
KETONES (URINE DIPSTICK): NEGATIVE MG/DL
LEUKOCYTES: NEGATIVE
MULTISTIX LOT#: NORMAL NUMERIC
NITRITE, URINE: NEGATIVE
OCCULT BLOOD: NEGATIVE
PH, URINE: 7 (ref 4.5–8)
PROTEIN (URINE DIPSTICK): NEGATIVE MG/DL
SPECIFIC GRAVITY: 1.01 (ref 1–1.03)
URINE-COLOR: YELLOW
UROBILINOGEN,SEMI-QN: 0.2 MG/DL (ref 0–1.9)

## 2018-02-23 PROCEDURE — 99214 OFFICE O/P EST MOD 30 MIN: CPT | Performed by: FAMILY MEDICINE

## 2018-02-23 PROCEDURE — 87591 N.GONORRHOEAE DNA AMP PROB: CPT | Performed by: FAMILY MEDICINE

## 2018-02-23 PROCEDURE — 87491 CHLMYD TRACH DNA AMP PROBE: CPT | Performed by: FAMILY MEDICINE

## 2018-02-23 PROCEDURE — 87086 URINE CULTURE/COLONY COUNT: CPT | Performed by: FAMILY MEDICINE

## 2018-02-23 PROCEDURE — 81003 URINALYSIS AUTO W/O SCOPE: CPT | Performed by: FAMILY MEDICINE

## 2018-02-23 RX ORDER — IRBESARTAN AND HYDROCHLOROTHIAZIDE 150; 12.5 MG/1; MG/1
1 TABLET, FILM COATED ORAL DAILY
Qty: 90 TABLET | Refills: 0 | Status: SHIPPED | OUTPATIENT
Start: 2018-02-23 | End: 2018-05-21

## 2018-02-23 NOTE — PROGRESS NOTES
Fitz Gregory IS A 38year old female HERE FOR Patient presents with:  UTI: Sympt- Pressure, sensitive. History of present illness:     First sexual activity in 5 years 6 d ago. 2x with condom, 2x without condom but without ejaculation.  Next day b Past Surgical History:  2005: CHOLECYSTECTOMY      Comment: laparoscopic, Dr. Kelsey Zavala: LAPAROSCOPY PROCEDURE UNLISTED      Comment: Laparoscopy    Current medications:       Current Outpatient Prescriptions:  Irbesartan-Hydrochlorothiazide 150-1 of systems:     Nephew is doing well. Exam:     /86   Pulse 88   Temp 98.2 °F (36.8 °C)   Resp 17   Ht 61\"   Wt 220 lb   LMP 02/05/2018 (Approximate)   SpO2 95%   BMI 41.57 kg/m²      Not examined.      Test results:   Office Visit on 02/23/2018 CULTURE, ROUTINE          Patient Instructions   Labs ordered for metabolic reasons and hormones. Recheck as scheduled for physical.    For counseling, call self-referral number.      We can refer to dietician after labs back, or at time of physical.

## 2018-02-23 NOTE — PATIENT INSTRUCTIONS
Labs ordered for metabolic reasons and hormones. Recheck as scheduled for physical.    For counseling, call self-referral number. We can refer to dietician after labs back, or at time of physical.     Urine culture ordered.

## 2018-02-26 LAB
C TRACH DNA SPEC QL NAA+PROBE: NEGATIVE
N GONORRHOEA DNA SPEC QL NAA+PROBE: NEGATIVE

## 2018-05-21 DIAGNOSIS — I10 ESSENTIAL HYPERTENSION: ICD-10-CM

## 2018-05-21 NOTE — TELEPHONE ENCOUNTER
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 Re-Scheduled Physical for August - the week she returns.     Rylie Pacheco

## 2018-05-22 DIAGNOSIS — I10 ESSENTIAL HYPERTENSION: ICD-10-CM

## 2018-05-22 RX ORDER — IRBESARTAN AND HYDROCHLOROTHIAZIDE 150; 12.5 MG/1; MG/1
1 TABLET, FILM COATED ORAL DAILY
Qty: 30 TABLET | Refills: 0 | Status: SHIPPED | OUTPATIENT
Start: 2018-05-22 | End: 2018-05-25

## 2018-05-22 RX ORDER — IRBESARTAN AND HYDROCHLOROTHIAZIDE 150; 12.5 MG/1; MG/1
1 TABLET, FILM COATED ORAL DAILY
Qty: 30 TABLET | Refills: 0 | Status: SHIPPED | OUTPATIENT
Start: 2018-05-22 | End: 2018-05-22

## 2018-05-22 RX ORDER — IRBESARTAN AND HYDROCHLOROTHIAZIDE 150; 12.5 MG/1; MG/1
1 TABLET, FILM COATED ORAL DAILY
Qty: 90 TABLET | Refills: 0 | OUTPATIENT
Start: 2018-05-22

## 2018-05-22 NOTE — TELEPHONE ENCOUNTER
LOV 2/18 Future Appointments      LAST LAB 9/17 Due    LAST RX   Irbesartan-Hydrochlorothiazide 150-12.5 MG Oral Tab 90 tablet 0 2/23/2018         PROTOCOL  Hypertension Medications Protocol Passed      Patient cancelled last 2 appointment.  PCP wanted to s

## 2018-05-24 ENCOUNTER — PATIENT MESSAGE (OUTPATIENT)
Dept: FAMILY MEDICINE CLINIC | Facility: CLINIC | Age: 43
End: 2018-05-24

## 2018-05-24 DIAGNOSIS — I10 ESSENTIAL HYPERTENSION: ICD-10-CM

## 2018-05-25 DIAGNOSIS — I10 ESSENTIAL HYPERTENSION: ICD-10-CM

## 2018-05-25 RX ORDER — IRBESARTAN AND HYDROCHLOROTHIAZIDE 150; 12.5 MG/1; MG/1
1 TABLET, FILM COATED ORAL DAILY
Qty: 90 TABLET | Refills: 0 | OUTPATIENT
Start: 2018-05-25

## 2018-05-25 RX ORDER — IRBESARTAN AND HYDROCHLOROTHIAZIDE 150; 12.5 MG/1; MG/1
1 TABLET, FILM COATED ORAL DAILY
Qty: 60 TABLET | Refills: 0 | Status: SHIPPED | OUTPATIENT
Start: 2018-05-25 | End: 2018-10-19

## 2018-05-25 NOTE — TELEPHONE ENCOUNTER
90 day refill denied  Rx was approved x 60 only    Pt will need F/U appt in July when she returns for [employer]

## 2018-05-25 NOTE — TELEPHONE ENCOUNTER
Pt was calling from work. Cannot leave work today and is leaving for [place] for the summer. Can we send in additional medication - which she needs for her trip? Call Cell number. [facility] as her service at work is not reliable.     [address]

## 2018-05-25 NOTE — TELEPHONE ENCOUNTER
From: Jena Gregory  To: Albania Silvestre MD  Sent: 5/24/2018 10:09 PM CDT  Subject: Prescription Question    Hi, Dr. Darren Clement. I am not sure if the nurse told you what is happening. I am leaving for [facility] this weekend.  I will not be back until July

## 2018-05-25 NOTE — TELEPHONE ENCOUNTER
Dr Judie Edge will approve #60 this time only  Pt will need to be seen before her physical by 7/24/18

## 2018-05-28 ENCOUNTER — OFFICE VISIT (OUTPATIENT)
Dept: FAMILY MEDICINE CLINIC | Facility: CLINIC | Age: 43
End: 2018-05-28

## 2018-05-28 VITALS
OXYGEN SATURATION: 99 % | DIASTOLIC BLOOD PRESSURE: 72 MMHG | BODY MASS INDEX: 41.54 KG/M2 | TEMPERATURE: 98 F | HEART RATE: 98 BPM | HEIGHT: 61 IN | SYSTOLIC BLOOD PRESSURE: 124 MMHG | WEIGHT: 220 LBS | RESPIRATION RATE: 18 BRPM

## 2018-05-28 DIAGNOSIS — J00 ACUTE NASOPHARYNGITIS: Primary | ICD-10-CM

## 2018-05-28 PROCEDURE — 87880 STREP A ASSAY W/OPTIC: CPT | Performed by: NURSE PRACTITIONER

## 2018-05-28 PROCEDURE — 99213 OFFICE O/P EST LOW 20 MIN: CPT | Performed by: NURSE PRACTITIONER

## 2018-05-28 NOTE — PROGRESS NOTES
CHIEF COMPLAINT:   Patient presents with:  Sore Throat: friday itching in throat- feeling better now, runny nose, had a mild cough-went away, headache, ear pain right ear-went away      HPI:   Walt Cuadra is a 38year old female who presents for upper GENERAL: good appetite  SKIN: no rashes or abnormal skin lesions  HEENT: See HPI  LUNGS: See HPI  CARDIOVASCULAR: denies chest pain or palpitations   GI: denies N/V/C or abdominal pain      EXAM:   /72   Pulse 98   Temp 98.1 °F (36.7 °C) (Oral)   Res The patient is asked to f/u with PCP if sx's persist or worsen. Patient Instructions       Preventing Common Respiratory Infections  Respiratory infections such as colds and influenza (“the flu”) are common in winter.  These infections are often caused by · Sinus problems  · Ear infections   Get a flu vaccine  A flu vaccine protects you from influenza (but not other colds or infections). Get a vaccine each fall, before flu season starts.  This can be done at a clinic, healthcare provider’s office, drugstore,

## 2018-08-02 ENCOUNTER — TELEPHONE (OUTPATIENT)
Dept: FAMILY MEDICINE CLINIC | Facility: CLINIC | Age: 43
End: 2018-08-02

## 2018-08-10 DIAGNOSIS — I10 ESSENTIAL HYPERTENSION: ICD-10-CM

## 2018-08-11 RX ORDER — IRBESARTAN AND HYDROCHLOROTHIAZIDE 150; 12.5 MG/1; MG/1
1 TABLET, FILM COATED ORAL DAILY
Qty: 30 TABLET | Refills: 0 | Status: SHIPPED | OUTPATIENT
Start: 2018-08-11 | End: 2018-10-19

## 2018-08-12 NOTE — TELEPHONE ENCOUNTER
LOV    LAST LAB    LAST RX 5/25/18 x 2 months    Next OV Future Appointments  Date Time Provider Fe Amanda   9/14/2018 1:00 PM Tawnya Fermin MD [facility]         PROTOCOL    Hypertension Medications Protocol Passed8/10 2:27 PM   CMP o

## 2018-08-24 ENCOUNTER — TELEPHONE (OUTPATIENT)
Dept: FAMILY MEDICINE CLINIC | Facility: CLINIC | Age: 43
End: 2018-08-24

## 2018-08-24 DIAGNOSIS — H04.129 DRY EYE: Primary | ICD-10-CM

## 2018-08-24 NOTE — TELEPHONE ENCOUNTER
Dr Patrizia Macdonald, pt has not been seen by you for this (or given a referral in the past)    Pt has upcoming appt in Sept  Future Appointments  Date Time Provider Fe Patel   9/14/2018 1:00 PM Cass Pollard MD [facility] to wait or

## 2018-08-27 ENCOUNTER — TELEPHONE (OUTPATIENT)
Dept: FAMILY MEDICINE CLINIC | Facility: CLINIC | Age: 43
End: 2018-08-27

## 2018-08-27 NOTE — TELEPHONE ENCOUNTER
Has not seen eye dr for this. LOV 2/18  Acute no mention of dry eyes. Will inform patient if you want.  Advised pt that typically if pt hasn't seen PCP about issues that they need to be addressed by the PCP first      Reason for the order/referral:

## 2018-08-28 NOTE — TELEPHONE ENCOUNTER
I already entered referral several days ago. Please notify pt. I endorse what she was told about usually seeing PCP if it is a new complaint.

## 2018-08-28 NOTE — TELEPHONE ENCOUNTER
Telephone Information:   Mobile [phone]     ML for patient. Advised if she has any question or issue with scheduling eye doctor appt. With Dr. Amador Man to call office. Number given.

## 2018-10-19 ENCOUNTER — OFFICE VISIT (OUTPATIENT)
Dept: FAMILY MEDICINE CLINIC | Facility: CLINIC | Age: 43
End: 2018-10-19

## 2018-10-19 VITALS
DIASTOLIC BLOOD PRESSURE: 84 MMHG | HEIGHT: 61 IN | RESPIRATION RATE: 17 BRPM | OXYGEN SATURATION: 98 % | TEMPERATURE: 99 F | BODY MASS INDEX: 42.48 KG/M2 | HEART RATE: 101 BPM | SYSTOLIC BLOOD PRESSURE: 134 MMHG | WEIGHT: 225 LBS

## 2018-10-19 DIAGNOSIS — H02.401 PTOSIS OF RIGHT EYELID: ICD-10-CM

## 2018-10-19 DIAGNOSIS — M62.838 MUSCLE SPASM: ICD-10-CM

## 2018-10-19 DIAGNOSIS — M54.2 CERVICALGIA: ICD-10-CM

## 2018-10-19 DIAGNOSIS — M62.838 TRAPEZIUS MUSCLE SPASM: ICD-10-CM

## 2018-10-19 DIAGNOSIS — M25.552 LEFT HIP PAIN: Primary | ICD-10-CM

## 2018-10-19 DIAGNOSIS — I10 ESSENTIAL HYPERTENSION: ICD-10-CM

## 2018-10-19 PROBLEM — F33.1 MAJOR DEPRESSIVE DISORDER, RECURRENT EPISODE, MODERATE (HCC): Status: ACTIVE | Noted: 2018-10-19

## 2018-10-19 PROCEDURE — 99214 OFFICE O/P EST MOD 30 MIN: CPT | Performed by: FAMILY MEDICINE

## 2018-10-19 RX ORDER — IRBESARTAN AND HYDROCHLOROTHIAZIDE 150; 12.5 MG/1; MG/1
1 TABLET, FILM COATED ORAL DAILY
Qty: 90 TABLET | Refills: 0 | Status: SHIPPED | OUTPATIENT
Start: 2018-10-19 | End: 2019-05-03

## 2018-10-19 NOTE — PATIENT INSTRUCTIONS
Consider bupropion for antidepressant, it can help for motivation and may help facilitate weight loss. Refer for PT for trapezius muscle strain and left hip pain (muscle spasm most likely). Recheck 2 months on depression, neck & hip.  Consider metfor

## 2018-10-19 NOTE — PROGRESS NOTES
Ashley Gregory IS A 37year old female HERE FOR Patient presents with:  HTN: Medication refills        History of present illness:     2 family tragedies (aunts' deaths) after return from [facility], tried calling Buffy Cramer-Hamman but didn't call her b Irbesartan-Hydrochlorothiazide 150-12.5 MG Oral Tab Take 1 tablet by mouth daily. Disp: 90 tablet Rfl: 0   Ibuprofen (ADVIL) 200 MG Oral Cap Take 200 mg by mouth 2 (two) times daily as needed. Disp:  Rfl:    BIOTIN OR Take 1 capsule by mouth.  Disp:  Rfl: Concern: Yes        Special Diet: Yes          Weight watchers        Back Care: Not Asked        Exercise: Yes          walks 30 min 3-4 times a week        Bike Helmet: Not Asked        Seat Belt: Not Asked        Self-Exams: Not Asked    Social History depression, neck & hip.

## 2018-12-07 ENCOUNTER — TELEPHONE (OUTPATIENT)
Dept: FAMILY MEDICINE CLINIC | Facility: CLINIC | Age: 43
End: 2018-12-07

## 2018-12-07 NOTE — TELEPHONE ENCOUNTER
If someone has ever had H pylori, blood test can stay positive long term. Only way to tell if there is a recurrent infection is a stool test, or a breath test that is done at GI office.      We could order a stool test for it (needs a sample of fresh sto

## 2018-12-07 NOTE — TELEPHONE ENCOUNTER
Future Appointments   Date Time Provider Fe Amanda   12/21/2018 10:30 AM Isabell Sherman MD [facility]     Called patient and advised a blood test is not an appropriate test to evaluate current H pylori infection if you have a history of

## 2018-12-07 NOTE — TELEPHONE ENCOUNTER
Pt called is going to have all of her labs done on 12/17/18 and has scheduled a F/U to discuss these labs      Pt is asking Dr Aaliyah De Souza to add on an H pylori blood test as she continues to have abdominal discomfort and has a history of H pylori    Pended onl

## 2018-12-14 NOTE — TELEPHONE ENCOUNTER
Pt needs appointment to discuss her GI issues or referral to GI, H pylori is not the only test that may be needed and I'm not going to order testing based on her self-diagnosis suspicions. I'll send a brief mychart message to her.

## 2018-12-14 NOTE — TELEPHONE ENCOUNTER
Patient called again requesting H Pylori be added to her labs to be done prior to OV.   Attempted to explain again why a blood test is not a reliable test to base treatment on once you have had H Pylori infection because it can continue to show in a blood t

## 2018-12-17 ENCOUNTER — TELEPHONE (OUTPATIENT)
Dept: FAMILY MEDICINE CLINIC | Facility: CLINIC | Age: 43
End: 2018-12-17

## 2018-12-17 ENCOUNTER — PATIENT MESSAGE (OUTPATIENT)
Dept: FAMILY MEDICINE CLINIC | Facility: CLINIC | Age: 43
End: 2018-12-17

## 2018-12-17 ENCOUNTER — LABORATORY ENCOUNTER (OUTPATIENT)
Dept: LAB | Age: 43
End: 2018-12-17
Attending: FAMILY MEDICINE
Payer: COMMERCIAL

## 2018-12-17 DIAGNOSIS — R73.01 IMPAIRED FASTING GLUCOSE: ICD-10-CM

## 2018-12-17 DIAGNOSIS — Z13.29 SCREENING FOR THYROID DISORDER: ICD-10-CM

## 2018-12-17 DIAGNOSIS — R10.13 EPIGASTRIC PAIN: ICD-10-CM

## 2018-12-17 DIAGNOSIS — Z13.0 SCREENING FOR DEFICIENCY ANEMIA: ICD-10-CM

## 2018-12-17 DIAGNOSIS — N80.9 ENDOMETRIOSIS: ICD-10-CM

## 2018-12-17 DIAGNOSIS — Z13.220 SCREENING, LIPID: ICD-10-CM

## 2018-12-17 DIAGNOSIS — L68.0 HIRSUTISM: ICD-10-CM

## 2018-12-17 DIAGNOSIS — E78.1 HYPERTRIGLYCERIDEMIA: ICD-10-CM

## 2018-12-17 DIAGNOSIS — Z13.1 SCREENING FOR DIABETES MELLITUS: ICD-10-CM

## 2018-12-17 PROCEDURE — 83002 ASSAY OF GONADOTROPIN (LH): CPT

## 2018-12-17 PROCEDURE — 84403 ASSAY OF TOTAL TESTOSTERONE: CPT

## 2018-12-17 PROCEDURE — 83001 ASSAY OF GONADOTROPIN (FSH): CPT

## 2018-12-17 PROCEDURE — 84402 ASSAY OF FREE TESTOSTERONE: CPT

## 2018-12-17 PROCEDURE — 82150 ASSAY OF AMYLASE: CPT

## 2018-12-17 PROCEDURE — 84443 ASSAY THYROID STIM HORMONE: CPT

## 2018-12-17 PROCEDURE — 83690 ASSAY OF LIPASE: CPT

## 2018-12-17 PROCEDURE — 80053 COMPREHEN METABOLIC PANEL: CPT

## 2018-12-17 PROCEDURE — 84144 ASSAY OF PROGESTERONE: CPT

## 2018-12-17 PROCEDURE — 80061 LIPID PANEL: CPT

## 2018-12-17 PROCEDURE — 83036 HEMOGLOBIN GLYCOSYLATED A1C: CPT

## 2018-12-17 PROCEDURE — 82670 ASSAY OF TOTAL ESTRADIOL: CPT

## 2018-12-17 PROCEDURE — 85025 COMPLETE CBC W/AUTO DIFF WBC: CPT

## 2018-12-17 PROCEDURE — 82627 DEHYDROEPIANDROSTERONE: CPT

## 2018-12-17 PROCEDURE — 36415 COLL VENOUS BLD VENIPUNCTURE: CPT

## 2018-12-17 NOTE — TELEPHONE ENCOUNTER
Past pancreatitis was caused by gallstones. Gallbladder was removed in 2005. She is again trying to order her own tests to find out why she has abdominal symptoms. I'm not adding anything, need history & exam then any added labs if indicated.  Labs were ord

## 2018-12-17 NOTE — TELEPHONE ENCOUNTER
Patient read THYME message sent by Dr. Chadwick Martinez    Last read by Aiden Hinton at 4:19 PM on 12/14/2018.      Future Appointments   Date Time Provider Fe Patel   12/17/2018 10:45 AM CARITO [facility] [address]   12/21/2018 10:30 AM Velma Mendes

## 2018-12-17 NOTE — TELEPHONE ENCOUNTER
From: Gene Guevara  Sent: 12/17/2018 1:39 PM CST  To: [facility] Clinical Staff  Subject: RE:request for other tests    ----- Message from Generic, Mychart sent at 12/17/2018 1:39 PM CST -----    Dr. Kenrick Jones.  I understand pancreatitis was due to gall sto

## 2018-12-17 NOTE — TELEPHONE ENCOUNTER
Dr Va Pearson, pt called, was just at the lab and is \"upset\" that there was no Amylase or Lipase ordered.     Pt is asking that we \"add them on\" to the blood they had drawn today

## 2018-12-21 ENCOUNTER — OFFICE VISIT (OUTPATIENT)
Dept: FAMILY MEDICINE CLINIC | Facility: CLINIC | Age: 43
End: 2018-12-21

## 2018-12-21 VITALS
OXYGEN SATURATION: 97 % | DIASTOLIC BLOOD PRESSURE: 80 MMHG | HEART RATE: 92 BPM | RESPIRATION RATE: 18 BRPM | BODY MASS INDEX: 42.1 KG/M2 | SYSTOLIC BLOOD PRESSURE: 118 MMHG | HEIGHT: 61 IN | TEMPERATURE: 99 F | WEIGHT: 223 LBS

## 2018-12-21 DIAGNOSIS — Z86.19 HISTORY OF HELICOBACTER PYLORI INFECTION: ICD-10-CM

## 2018-12-21 DIAGNOSIS — R10.13 EPIGASTRIC ABDOMINAL PAIN: ICD-10-CM

## 2018-12-21 DIAGNOSIS — R19.7 DIARRHEA, UNSPECIFIED TYPE: Primary | ICD-10-CM

## 2018-12-21 PROCEDURE — 99214 OFFICE O/P EST MOD 30 MIN: CPT | Performed by: FAMILY MEDICINE

## 2018-12-21 NOTE — PATIENT INSTRUCTIONS
Stool tests ordered. Refer to GI to discuss possible IBS or other causes. Will check for H pylori but symptoms not typical.     FODMAPs diet suggested. For gas, try some of these other measures:  1.  For possible milk or lactose intolerance, use Acidophi

## 2018-12-21 NOTE — PROGRESS NOTES
Ezra Gregory IS A 37year old female HERE FOR Patient presents with:  Lab Results: Go over labs   Abdominal Pain: Shooting pain x1 month        History of present illness:     Had localized sharp \"shooting\" pain (describes as pushing outward from insi • Endometriosis, site unspecified    • External hemorrhoids without mention of complication    • Gastritis     mild   • LHQQDTDR(109.7)    • Helicobacter pylori (H. pylori) 3/2010    H. pylori (+)   • High-density lipoprotein deficiency     \"low HDL\" Needs      Financial resource strain: Not on file      Food insecurity - worry: Not on file      Food insecurity - inability: Not on file      Transportation needs - medical: Not on file      Transportation needs - non-medical: Not on file    Occupational 12/17/2018 97    • [address] 12/17/2018 9.2    • TSH 12/17/2018 3.550    • HgbA1C 12/17/2018 6.0*   • Estimated Average Glucose 12/17/2018 126    • Cholesterol, Total 12/17/2018 136    • HDL Cholesterol 12/17/2018 34*   • Triglycerides 12/17/2018 304*   • LDL Ch lab results and abdominal pain. Diagnoses and all orders for this visit:    Diarrhea, unspecified type  -     STOOL CULTURE W/SHIGATOXIN; Future  -     OVA AND PARASITE W GIARDIA EIA; Future  -     C. DIFFICILE(TOXIGENIC)PCR;  Future  -     GASTRO - INTE avoid worsening diarrhea, avoid fried foods. Until stools are more firm, avoid milk or cheese. Probiotics may help re-establish normal bacterial awais. Active yogurt cultures can help with this, or an over-the-counter Probiotic.  Common probiotic brands: Fl

## 2018-12-22 ENCOUNTER — LAB ENCOUNTER (OUTPATIENT)
Dept: LAB | Facility: HOSPITAL | Age: 43
End: 2018-12-22
Attending: FAMILY MEDICINE
Payer: COMMERCIAL

## 2018-12-22 DIAGNOSIS — R10.13 EPIGASTRIC ABDOMINAL PAIN: ICD-10-CM

## 2018-12-22 DIAGNOSIS — R19.7 DIARRHEA, UNSPECIFIED TYPE: ICD-10-CM

## 2018-12-22 PROCEDURE — 87045 FECES CULTURE AEROBIC BACT: CPT

## 2018-12-22 PROCEDURE — 87046 STOOL CULTR AEROBIC BACT EA: CPT

## 2018-12-22 PROCEDURE — 87493 C DIFF AMPLIFIED PROBE: CPT

## 2018-12-22 PROCEDURE — 87272 CRYPTOSPORIDIUM AG IF: CPT

## 2018-12-22 PROCEDURE — 87427 SHIGA-LIKE TOXIN AG IA: CPT

## 2018-12-22 PROCEDURE — 87177 OVA AND PARASITES SMEARS: CPT

## 2018-12-22 PROCEDURE — 87338 HPYLORI STOOL AG IA: CPT

## 2018-12-22 PROCEDURE — 87329 GIARDIA AG IA: CPT

## 2018-12-22 PROCEDURE — 87209 SMEAR COMPLEX STAIN: CPT

## 2019-01-01 ENCOUNTER — PRIOR ORIGINAL RECORDS (OUTPATIENT)
Dept: HEALTH INFORMATION MANAGEMENT | Facility: OTHER | Age: 44
End: 2019-01-01

## 2019-01-16 NOTE — TELEPHONE ENCOUNTER
Viewed by Ash Meyer on 12/28/2018  7:49 PM   Written by Cesario Babinski, MD on 12/28/2018  1:03 PM   The rest of stool tests are normal. H pylori is negative, and there are no parasites detected.  Diarrhea is probably irritable bowel syndrome (IBS)-

## 2019-01-16 NOTE — TELEPHONE ENCOUNTER
Future Appointments   Date Time Provider Fe Patel   1/17/2019  2:00 PM Frankey Shackleton, MD [facility]   3/11/2019  2:40 PM Arun Mclaughlin MD [facility]

## 2019-01-17 ENCOUNTER — OFFICE VISIT (OUTPATIENT)
Dept: FAMILY MEDICINE CLINIC | Facility: CLINIC | Age: 44
End: 2019-01-17

## 2019-01-17 ENCOUNTER — LABORATORY ENCOUNTER (OUTPATIENT)
Dept: LAB | Age: 44
End: 2019-01-17
Attending: FAMILY MEDICINE
Payer: COMMERCIAL

## 2019-01-17 VITALS
HEIGHT: 61 IN | BODY MASS INDEX: 42.1 KG/M2 | SYSTOLIC BLOOD PRESSURE: 122 MMHG | HEART RATE: 101 BPM | OXYGEN SATURATION: 97 % | DIASTOLIC BLOOD PRESSURE: 80 MMHG | RESPIRATION RATE: 17 BRPM | WEIGHT: 223 LBS | TEMPERATURE: 99 F

## 2019-01-17 DIAGNOSIS — R19.7 DIARRHEA, UNSPECIFIED TYPE: ICD-10-CM

## 2019-01-17 DIAGNOSIS — I10 ESSENTIAL HYPERTENSION: ICD-10-CM

## 2019-01-17 DIAGNOSIS — R10.13 EPIGASTRIC ABDOMINAL PAIN: ICD-10-CM

## 2019-01-17 DIAGNOSIS — R10.13 EPIGASTRIC ABDOMINAL PAIN: Primary | ICD-10-CM

## 2019-01-17 DIAGNOSIS — Z90.49 STATUS POST CHOLECYSTECTOMY: ICD-10-CM

## 2019-01-17 LAB — IMMUNOGLOBULIN A: 185 MG/DL (ref 70–312)

## 2019-01-17 PROCEDURE — 83516 IMMUNOASSAY NONANTIBODY: CPT

## 2019-01-17 PROCEDURE — 82784 ASSAY IGA/IGD/IGG/IGM EACH: CPT

## 2019-01-17 PROCEDURE — 99213 OFFICE O/P EST LOW 20 MIN: CPT | Performed by: FAMILY MEDICINE

## 2019-01-17 PROCEDURE — 86256 FLUORESCENT ANTIBODY TITER: CPT

## 2019-01-17 RX ORDER — IRBESARTAN AND HYDROCHLOROTHIAZIDE 150; 12.5 MG/1; MG/1
1 TABLET, FILM COATED ORAL DAILY
Qty: 90 TABLET | Refills: 1 | Status: SHIPPED | OUTPATIENT
Start: 2019-01-17 | End: 2019-02-06 | Stop reason: ALTCHOICE

## 2019-01-17 NOTE — PROGRESS NOTES
Destini Gregory IS A 37year old female HERE FOR Patient presents with:  Abdominal Pain: F/U on abd pain   Note For Work: Letter needed        History of present illness:     From patient web message yesterday:    Dr. Andreea Jones.  I have an appointment napoleon mood disorder   • Endometriosis, site unspecified    • External hemorrhoids without mention of complication    • Gastritis     mild   • JGPIOYFX(040.3)    • Helicobacter pylori (H. pylori) 3/2010    H. pylori (+)   • High-density lipoprotein deficiency Social Needs      Financial resource strain: Not on file      Food insecurity - worry: Not on file      Food insecurity - inability: Not on file      Transportation needs - medical: Not on file      Transportation needs - non-medical: Not on file    Occup Future  -     US ABDOMEN COMPLETE (CPT=76700); Future    Status post cholecystectomy  -     US ABDOMEN COMPLETE (CPT=76700); Future          Patient Instructions   Abdominal ultrasound and celiac disease panel.     If blood tests for celiac are negative, yo

## 2019-01-17 NOTE — PATIENT INSTRUCTIONS
Abdominal ultrasound and celiac disease panel. If blood tests for celiac are negative, you probably don't need to avoid gluten lifelong. DIscuss adding back other foods with GI. Note written for employer.  You could try imodium over the counter, or

## 2019-01-21 LAB
GLIAD (DEAMIDATED) AB, IGA: NEGATIVE
GLIAD (DEAMIDATED) AB, IGG: NEGATIVE
TISSUE TRANSGLUTAMINASE AB,IGA: <1.2 U/ML (ref ?–15)
TISSUE TRANSGLUTAMINASE IGA QUALITATIVE: NEGATIVE

## 2019-02-05 ENCOUNTER — PATIENT MESSAGE (OUTPATIENT)
Dept: FAMILY MEDICINE CLINIC | Facility: CLINIC | Age: 44
End: 2019-02-05

## 2019-02-06 RX ORDER — OLMESARTAN MEDOXOMIL AND HYDROCHLOROTHIAZIDE 20/12.5 20; 12.5 MG/1; MG/1
1 TABLET ORAL DAILY
Qty: 30 TABLET | Refills: 1 | Status: SHIPPED | OUTPATIENT
Start: 2019-02-06 | End: 2019-03-08

## 2019-02-06 NOTE — TELEPHONE ENCOUNTER
From: Fitz Gregory  To: Talia Chappell MD  Sent: 2/5/2019 11:13 PM CST  Subject: Other    Kai Felder, Dr. Josephine Ramos. Today I received a letter from [place] stating my blood pressure prescription has been recalled. I just got the 3-month supply.  I will nee

## 2019-02-07 RX ORDER — OLMESARTAN MEDOXOMIL AND HYDROCHLOROTHIAZIDE 20/12.5 20; 12.5 MG/1; MG/1
1 TABLET ORAL DAILY
Qty: 90 TABLET | Refills: 1 | OUTPATIENT
Start: 2019-02-07 | End: 2019-03-09

## 2019-02-07 NOTE — TELEPHONE ENCOUNTER
Name from pharmacy: OLMESARTAN MEDOX/HCTZ 20-12.5MG TAB         Will file in chart as: OLMESARTAN MEDOXOMIL-HCTZ 20-12.5 MG Oral Tab    Sig: Take 1 tablet by mouth daily.     Original sig: TAKE 1 TABLET BY MOUTH DAILY    Disp:  90 tablet    Refills:  1

## 2019-02-14 ENCOUNTER — TELEPHONE (OUTPATIENT)
Dept: FAMILY MEDICINE CLINIC | Facility: CLINIC | Age: 44
End: 2019-02-14

## 2019-02-14 DIAGNOSIS — H02.401 PTOSIS, RIGHT EYELID: ICD-10-CM

## 2019-02-14 DIAGNOSIS — H04.123 DRY EYES: Primary | ICD-10-CM

## 2019-02-14 NOTE — TELEPHONE ENCOUNTER
Received call from Dr. Van Garay office requesting updated referral.  Patient has appointment scheduled 2/15/19. Referral entered in [address] Faxed to office at [phone] as requested.

## 2019-04-05 ENCOUNTER — HOSPITAL ENCOUNTER (OUTPATIENT)
Dept: ULTRASOUND IMAGING | Age: 44
Discharge: HOME | End: 2019-04-05
Attending: FAMILY MEDICINE
Payer: COMMERCIAL

## 2019-04-05 DIAGNOSIS — R19.7 DIARRHEA, UNSPECIFIED TYPE: ICD-10-CM

## 2019-04-05 DIAGNOSIS — Z90.49 STATUS POST CHOLECYSTECTOMY: ICD-10-CM

## 2019-04-05 DIAGNOSIS — R10.13 EPIGASTRIC ABDOMINAL PAIN: ICD-10-CM

## 2019-04-05 PROCEDURE — 76700 US EXAM ABDOM COMPLETE: CPT | Performed by: FAMILY MEDICINE

## 2019-04-22 ENCOUNTER — TELEPHONE (OUTPATIENT)
Dept: FAMILY MEDICINE CLINIC | Facility: CLINIC | Age: 44
End: 2019-04-22

## 2019-04-22 NOTE — TELEPHONE ENCOUNTER
Dr Manda Ovalles, are you aware of any issues with pt's feet that would allow you to give a referral to podiatry?     Otherwise pt needs appt for referral to specialist

## 2019-04-22 NOTE — TELEPHONE ENCOUNTER
Patient called wanting to know if she needs to come in for another referral for podiatrist, states it is getting expensive to keep coming in for referrals

## 2019-04-22 NOTE — TELEPHONE ENCOUNTER
I searched on feet, foot, heel, plantar, bunion, and nails and found no relevant notes. She needs to come in for foot issues. Sorry it's getting expensive but if she had multiple problems to address at once we could generate more referrals at once.  Sometim

## 2019-04-23 NOTE — TELEPHONE ENCOUNTER
She last went in 2012. referred to DR. Kang then. That was before this system, no notes.  I'd like to update her current complaints & exam.

## 2019-04-26 ENCOUNTER — OFFICE VISIT (OUTPATIENT)
Dept: FAMILY MEDICINE CLINIC | Facility: CLINIC | Age: 44
End: 2019-04-26

## 2019-04-26 VITALS
RESPIRATION RATE: 17 BRPM | BODY MASS INDEX: 42.48 KG/M2 | OXYGEN SATURATION: 94 % | SYSTOLIC BLOOD PRESSURE: 130 MMHG | WEIGHT: 225 LBS | HEIGHT: 61 IN | TEMPERATURE: 99 F | HEART RATE: 104 BPM | DIASTOLIC BLOOD PRESSURE: 88 MMHG

## 2019-04-26 DIAGNOSIS — N80.9 ENDOMETRIOSIS: ICD-10-CM

## 2019-04-26 DIAGNOSIS — D25.9 UTERINE LEIOMYOMA, UNSPECIFIED LOCATION: ICD-10-CM

## 2019-04-26 DIAGNOSIS — K58.0 IRRITABLE BOWEL SYNDROME WITH DIARRHEA: Primary | ICD-10-CM

## 2019-04-26 DIAGNOSIS — K52.9 CHRONIC DIARRHEA: ICD-10-CM

## 2019-04-26 DIAGNOSIS — K76.0 FATTY LIVER: ICD-10-CM

## 2019-04-26 PROCEDURE — 99214 OFFICE O/P EST MOD 30 MIN: CPT | Performed by: FAMILY MEDICINE

## 2019-04-26 NOTE — PROGRESS NOTES
Brigitte Gregory IS A 37year old female HERE FOR Patient presents with: Foot Pain: LT heel x5 days. Sympt - Limping   Imaging: Go over results   HTN: Medication questions        History of present illness:     Sudden onset 5 d left heel pain.  When raining Ibuprofen (ADVIL) 200 MG Oral Cap Take 200 mg by mouth 2 (two) times daily as needed.  Disp:  Rfl:        Allergy:        Codeine                 RASH  Enbrel                  RASH  Seafood                 SWELLING    Comment:Angioedema  Shellfish-Derived P Intimate partner violence:        Fear of current or ex partner: Not on file        Emotionally abused: Not on file        Physically abused: Not on file        Forced sexual activity: Not on file    Other Topics      Concerns:         Service: Your heel is the back part of your foot. A band of tissue called the plantar fascia connects the heel bone to the bones in the ball of your foot. Nerves run from the heel up the inside of your ankle and into your leg.  When you feel pain in the bottom of yo First, your healthcare provider tries to find out the cause of your problem. He or she can then suggest ways to ease pain. If your pain is due to poor foot mechanics, occasionally custom-made shoe inserts (orthoses) may help.     Ease symptoms  · To relieve Try ibuprofen 2 pills twice a day with food. (try 12 hours between). Try olmesartan 20 HCT 12.5 for hypertension. Recheck blood pressure in 3-4 weeks.

## 2019-04-26 NOTE — PATIENT INSTRUCTIONS
Understanding Heel Pain    Your heel is the back part of your foot. A band of tissue called the plantar fascia connects the heel bone to the bones in the ball of your foot. Nerves run from the heel up the inside of your ankle and into your leg.  When you First, your healthcare provider tries to find out the cause of your problem. He or she can then suggest ways to ease pain. If your pain is due to poor foot mechanics, occasionally custom-made shoe inserts (orthoses) may help.     Ease symptoms  · To relieve Try ibuprofen 2 pills twice a day with food. (try 12 hours between). Try olmesartan 20 HCT 12.5 for hypertension. Recheck blood pressure in 3-4 weeks.

## 2019-05-03 ENCOUNTER — TELEPHONE (OUTPATIENT)
Dept: FAMILY MEDICINE CLINIC | Facility: CLINIC | Age: 44
End: 2019-05-03

## 2019-05-03 NOTE — TELEPHONE ENCOUNTER
OK to stop the olmesartan. Does she have some irbesartan leftover from past? If not, we can send rx and hopefully it will not be on backorder.

## 2019-05-03 NOTE — TELEPHONE ENCOUNTER
Patient LVM at 9:34 am regarding a complication with her new BP medication. Voicemessage transferred to triage  due to many details being left on message.

## 2019-05-03 NOTE — TELEPHONE ENCOUNTER
DR Harjinder Velazquez, PT STATES EVERYDAY SINCE STARTING RECENT B/P MEDS SHE HAS HEADACHES EVERYDAY    Olmesartan Medoxomil-HCTZ 20-12.5 MG Oral Tab     WOULD LIKE TO GO BACK TO THERE IRBESARTAN-HCTZ BECAUSE SHE NEVER HAD THESE PROBLEMS    PLEASE ADVISE

## 2019-05-03 NOTE — TELEPHONE ENCOUNTER
Left detailed message for pt ok to stop Olmesartan     Can call [facility], should have an additional refill of Irbesartan available    Asked pt to let us know if she needs new rx if [facility] cancelled Rx

## 2019-05-17 ENCOUNTER — HOSPITAL ENCOUNTER (OUTPATIENT)
Dept: ULTRASOUND IMAGING | Age: 44
Discharge: HOME | End: 2019-05-17
Attending: FAMILY MEDICINE
Payer: COMMERCIAL

## 2019-05-17 DIAGNOSIS — D25.9 UTERINE LEIOMYOMA, UNSPECIFIED LOCATION: ICD-10-CM

## 2019-05-17 DIAGNOSIS — N80.9 ENDOMETRIOSIS: ICD-10-CM

## 2019-05-17 PROCEDURE — 76856 US EXAM PELVIC COMPLETE: CPT | Performed by: FAMILY MEDICINE

## 2019-05-17 PROCEDURE — 76830 TRANSVAGINAL US NON-OB: CPT | Performed by: FAMILY MEDICINE

## 2019-05-21 ENCOUNTER — TELEPHONE (OUTPATIENT)
Dept: FAMILY MEDICINE CLINIC | Facility: CLINIC | Age: 44
End: 2019-05-21

## 2019-05-21 NOTE — TELEPHONE ENCOUNTER
Pt left a voicemail which was transferred to triage voicemail. Said she just received recent test results which needs an extended appointment. She is scheduled with Dr Dennis Sanders this Friday. Please advise.

## 2019-05-21 NOTE — TELEPHONE ENCOUNTER
Patient's VM asks is her appointment should be longer to discuss treatment of the \"melon sized tumor\" in her uterus. VMML for patient advising her appointment of Friday is 30 minutes and should be adequate.   Informed Dr. Precious Montelongo recommended she follow up

## 2019-05-24 ENCOUNTER — OFFICE VISIT (OUTPATIENT)
Dept: FAMILY MEDICINE CLINIC | Facility: CLINIC | Age: 44
End: 2019-05-24

## 2019-05-24 VITALS
SYSTOLIC BLOOD PRESSURE: 124 MMHG | TEMPERATURE: 99 F | HEART RATE: 96 BPM | BODY MASS INDEX: 43.8 KG/M2 | RESPIRATION RATE: 17 BRPM | HEIGHT: 61 IN | OXYGEN SATURATION: 95 % | WEIGHT: 232 LBS | DIASTOLIC BLOOD PRESSURE: 74 MMHG

## 2019-05-24 DIAGNOSIS — Z87.42 HISTORY OF ENDOMETRIOSIS: ICD-10-CM

## 2019-05-24 DIAGNOSIS — M79.672 INTRACTABLE LEFT HEEL PAIN: ICD-10-CM

## 2019-05-24 DIAGNOSIS — D25.9 UTERINE LEIOMYOMA, UNSPECIFIED LOCATION: Primary | ICD-10-CM

## 2019-05-24 PROCEDURE — 99213 OFFICE O/P EST LOW 20 MIN: CPT | Performed by: FAMILY MEDICINE

## 2019-05-24 RX ORDER — IRBESARTAN AND HYDROCHLOROTHIAZIDE 150; 12.5 MG/1; MG/1
TABLET, FILM COATED ORAL
Qty: 90 TABLET | Refills: 1 | Status: SHIPPED | OUTPATIENT
Start: 2019-05-24 | End: 2019-08-12

## 2019-05-24 RX ORDER — IRBESARTAN AND HYDROCHLOROTHIAZIDE 150; 12.5 MG/1; MG/1
TABLET, FILM COATED ORAL
Refills: 1 | COMMUNITY
Start: 2019-05-03 | End: 2019-05-24

## 2019-05-24 NOTE — PROGRESS NOTES
Landen Gregory IS A 37year old female HERE FOR Patient presents with:  Depression: Med F/U   Imaging       History of present illness:     BP better and no longer has headaches. Pelvic pain, abdomen hard.      Before pelvic u/s, pain lasted all week 2 Comment:Headaches daily  Enbrel                  RASH  Seafood                 SWELLING    Comment:Angioedema  Shellfish-Derived P*    RASH    Family history:       Family History   Problem Relation Age of Onset   • Diabetes Father    • Diabetes Mother Physically abused: Not on file        Forced sexual activity: Not on file    Other Topics      Concerns:         Service: Not Asked        Blood Transfusions: Not Asked        Caffeine Concern: Yes          1/2 cup coffee daily        Occupat Fibroids are growths that usually form in the wall of your uterus. They are also called myomas and leiomyomas. Fibroids are the most common tumor in women. They are almost always noncancer (benign) and harmless.  Fibroids are made up of connective tissue an © 6408-0164 [employer] [address] [facility] [place], [facility] [place]. All rights reserved. This information is not intended as a substitute for professional medical care. Always follow your healthcare professional's instructions.

## 2019-05-24 NOTE — PATIENT INSTRUCTIONS
health. org other gyne suggestions: Riri Bailey, Dr. Lua Shown. What Are Fibroids? Fibroids are growths that usually form in the wall of your uterus. They are also called myomas and leiomyomas. Fibroids are the most common tumor in women.  They a Treating your fibroids is likely to relieve your symptoms. But your healthcare provider will want to check your progress. Ask your provider about any other follow-up visits you might need.    Date Last Reviewed: 6/1/2017  © 2188-7213 [employer]

## 2019-05-28 ENCOUNTER — TELEPHONE (OUTPATIENT)
Dept: OBGYN CLINIC | Facility: CLINIC | Age: 44
End: 2019-05-28

## 2019-05-28 NOTE — TELEPHONE ENCOUNTER
PT needs to see Dr. Bell García ASAP for possibly surgery and/or review US done by and referred by Dr. Shara Ralph    PT unable to do Tuesdays

## 2019-06-05 ENCOUNTER — OFFICE VISIT (OUTPATIENT)
Dept: OBGYN CLINIC | Facility: CLINIC | Age: 44
End: 2019-06-05

## 2019-06-05 VITALS
DIASTOLIC BLOOD PRESSURE: 72 MMHG | HEART RATE: 91 BPM | HEIGHT: 61 IN | BODY MASS INDEX: 44 KG/M2 | SYSTOLIC BLOOD PRESSURE: 114 MMHG

## 2019-06-05 DIAGNOSIS — N85.8 UTERINE MASS: Primary | ICD-10-CM

## 2019-06-05 PROCEDURE — 99203 OFFICE O/P NEW LOW 30 MIN: CPT | Performed by: OBSTETRICS & GYNECOLOGY

## 2019-06-05 RX ORDER — DIPHENHYDRAMINE HYDROCHLORIDE 25 MG/1
1 TABLET ORAL DAILY
COMMUNITY
End: 2020-07-24

## 2019-06-05 NOTE — PROGRESS NOTES
Walt Cuadra is a 37year old female. HPI:   Patient presents with: Other: Pt states that she has a 13cm fibroid tumor.       Sent from [employer] pelvic pain, abd fullness  No menstrual c/o    U/s showed large ?fibroid    U/s 5 years ago, no sign o daily. Disp:  Rfl:    Irbesartan-hydroCHLOROthiazide 150-12.5 MG Oral Tab TK 1 T PO D Disp: 90 tablet Rfl: 1   Ibuprofen (ADVIL) 200 MG Oral Cap Take 200 mg by mouth 2 (two) times daily as needed.  Disp:  Rfl:        Allergies:    Codeine                 RA

## 2019-06-12 ENCOUNTER — TELEPHONE (OUTPATIENT)
Dept: OBGYN CLINIC | Facility: CLINIC | Age: 44
End: 2019-06-12

## 2019-06-12 NOTE — TELEPHONE ENCOUNTER
Patient informed that referral was approved for MRI. Verbalized understanding. No further questions or concerns.

## 2019-06-14 ENCOUNTER — OFFICE VISIT (OUTPATIENT)
Dept: FAMILY MEDICINE CLINIC | Facility: CLINIC | Age: 44
End: 2019-06-14

## 2019-06-14 VITALS
HEART RATE: 96 BPM | RESPIRATION RATE: 18 BRPM | OXYGEN SATURATION: 95 % | TEMPERATURE: 98 F | DIASTOLIC BLOOD PRESSURE: 72 MMHG | SYSTOLIC BLOOD PRESSURE: 138 MMHG

## 2019-06-14 DIAGNOSIS — M62.838 TRAPEZIUS MUSCLE SPASM: Primary | ICD-10-CM

## 2019-06-14 PROCEDURE — 99213 OFFICE O/P EST LOW 20 MIN: CPT | Performed by: FAMILY MEDICINE

## 2019-06-14 RX ORDER — CYCLOBENZAPRINE HCL 10 MG
10 TABLET ORAL 3 TIMES DAILY
Qty: 30 TABLET | Refills: 1 | Status: SHIPPED | OUTPATIENT
Start: 2019-06-14 | End: 2019-07-22

## 2019-06-14 NOTE — PROGRESS NOTES
Destini Gregory IS A 37year old female HERE FOR Patient presents with:  Shoulder Pain: pain and numbness        History of present illness:     Left shoulder pain, anterior chest numbness. Starts at lateral neck. Moves toward lateral left shoulder.      Pa Relation Age of Onset   • Diabetes Father    • Diabetes Mother    • Other (digestive disorder) Mother         gallbladder   • Other (HTN) Mother    • Other (digestive disorder) Sister         gallbladder   • Other (digestive disorder) Other         gallbla 1/2 cup coffee daily        Occupational Exposure: No        Hobby Hazards: Not Asked        Sleep Concern: No        Stress Concern: Yes        Weight Concern: Yes        Special Diet: Yes          Weight watchers        Back Care: Not Asked        Tobias Parkinson

## 2019-06-14 NOTE — PATIENT INSTRUCTIONS
Trapezius strain. Heat, Massage, physical therapy (ask for Martina at the [address]  [facility] [address])    Cyclobenzaprine 10 mg daily at bedtime, can try during day but causes tenderness.

## 2019-06-19 ENCOUNTER — TELEPHONE (OUTPATIENT)
Dept: OBGYN CLINIC | Facility: CLINIC | Age: 44
End: 2019-06-19

## 2019-06-19 DIAGNOSIS — N85.8 UTERINE MASS: Primary | ICD-10-CM

## 2019-06-19 NOTE — TELEPHONE ENCOUNTER
[facility] radiology department calling states pt's order for MRI Pelvis should be with contrast.     Please re-order and may need to obtain authorization. Pt has appt in a few days.

## 2019-06-19 NOTE — TELEPHONE ENCOUNTER
Corrected order placed. Referral for MRI on chart and authorized. Radiology dept notified of new order and authorized referral on chart.

## 2019-06-20 ENCOUNTER — HOSPITAL ENCOUNTER (OUTPATIENT)
Dept: GENERAL RADIOLOGY | Age: 44
Discharge: HOME | End: 2019-06-20
Attending: PODIATRIST
Payer: COMMERCIAL

## 2019-06-20 ENCOUNTER — OFFICE VISIT (OUTPATIENT)
Dept: PODIATRY CLINIC | Facility: CLINIC | Age: 44
End: 2019-06-20

## 2019-06-20 VITALS — HEART RATE: 84 BPM | RESPIRATION RATE: 20 BRPM | SYSTOLIC BLOOD PRESSURE: 151 MMHG | DIASTOLIC BLOOD PRESSURE: 88 MMHG

## 2019-06-20 DIAGNOSIS — G89.29 HEEL PAIN, CHRONIC, LEFT: Primary | ICD-10-CM

## 2019-06-20 DIAGNOSIS — M79.672 HEEL PAIN, CHRONIC, LEFT: Primary | ICD-10-CM

## 2019-06-20 DIAGNOSIS — M79.672 HEEL PAIN, CHRONIC, LEFT: ICD-10-CM

## 2019-06-20 DIAGNOSIS — G89.29 HEEL PAIN, CHRONIC, LEFT: ICD-10-CM

## 2019-06-20 PROCEDURE — 20550 NJX 1 TENDON SHEATH/LIGAMENT: CPT | Performed by: PODIATRIST

## 2019-06-20 PROCEDURE — 73620 X-RAY EXAM OF FOOT: CPT | Performed by: PODIATRIST

## 2019-06-20 PROCEDURE — 99203 OFFICE O/P NEW LOW 30 MIN: CPT | Performed by: PODIATRIST

## 2019-06-20 RX ORDER — TRIAMCINOLONE ACETONIDE 40 MG/ML
40 INJECTION, SUSPENSION INTRA-ARTICULAR; INTRAMUSCULAR ONCE
Status: COMPLETED | OUTPATIENT
Start: 2019-06-20 | End: 2019-06-20

## 2019-06-20 RX ADMIN — TRIAMCINOLONE ACETONIDE 40 MG: 40 INJECTION, SUSPENSION INTRA-ARTICULAR; INTRAMUSCULAR at 16:01:00

## 2019-06-20 NOTE — PROGRESS NOTES
Per [facility] request was draw 1 syringe with 1 ml Kenalog 40 mg and 1 ml Marcaine 0.5% for this patient left foot. Maggie Worrell

## 2019-06-21 ENCOUNTER — HOSPITAL ENCOUNTER (OUTPATIENT)
Dept: MRI IMAGING | Age: 44
Discharge: HOME | End: 2019-06-21
Attending: OBSTETRICS & GYNECOLOGY
Payer: COMMERCIAL

## 2019-06-21 DIAGNOSIS — N85.8 UTERINE MASS: ICD-10-CM

## 2019-06-21 PROCEDURE — 72195 MRI PELVIS W/O DYE: CPT | Performed by: OBSTETRICS & GYNECOLOGY

## 2019-06-23 NOTE — PROGRESS NOTES
Nghia Forte is a 53year old female.  Patient presents with:  Heel Pain: Left - onset over 1 mo ago - no injury - has pain on the medial aspect and bottom of the heel rated as 2-8/10 on and off         HPI:   This very pleasant patient presents to the c pruritic disorder       Past Surgical History:   Procedure Laterality Date   • CHOLECYSTECTOMY  2005    laparoscopic, Dr. Josselyn Villagomez   • [facility]    Laparoscopy      Family History   Problem Relation Age of Onset   • Diabetes Father in no apparent distress  EXTREMITIES:   1. Integument: The patient has intact pedal skin is warm, dry and supple. 2. Vascular: Patient has palpable dorsalis pedis and posterior tibial pulses on the right foot and left.    3. Neurologic: The patient has in

## 2019-06-24 ENCOUNTER — TELEPHONE (OUTPATIENT)
Dept: FAMILY MEDICINE CLINIC | Facility: CLINIC | Age: 44
End: 2019-06-24

## 2019-06-24 ENCOUNTER — TELEPHONE (OUTPATIENT)
Dept: OBGYN CLINIC | Facility: CLINIC | Age: 44
End: 2019-06-24

## 2019-06-24 DIAGNOSIS — D49.59 UTERINE NEOPLASM: Primary | ICD-10-CM

## 2019-06-24 NOTE — TELEPHONE ENCOUNTER
Pt was referred to Dr Domenica Joseph to review MRI per Dr Oh Sampson and their office is requesting referral and clinical notes/ MRI  Needs everything sent over    Dr Domenica Joseph  Ph. [phone]

## 2019-06-24 NOTE — TELEPHONE ENCOUNTER
Dr. Josephine Ramos,  [facility]    Do you want pt. To schedule an appt? 6/21/19 Pelvic MRI:  CONCLUSION:   Significant enlargement of the uterine body and fundus with the images favoring adenomyosis more than enlarged fibroid uterus.   A contrast enhanced study would a

## 2019-06-24 NOTE — TELEPHONE ENCOUNTER
Needs to schedule with Dr. Fidel Pearl. She needs to see a further specialist in uterine tumors (Dr Lino Sandoval) to plan treatment. I don't need to see her, but I duplicated the referral to Dr. Lino Sandoval in case the HMO wants it to come from me.

## 2019-06-24 NOTE — TELEPHONE ENCOUNTER
Patient left voicemail stating she would like to speak with Dr. Mami Ramirez regarding needing to see an oncologist. Please return call.

## 2019-06-24 NOTE — PROGRESS NOTES
Patient informed of results. Verbalized understanding. No further questions or concerns. Information given.

## 2019-06-26 ENCOUNTER — TELEPHONE (OUTPATIENT)
Dept: PHYSICAL THERAPY | Facility: HOSPITAL | Age: 44
End: 2019-06-26

## 2019-06-26 NOTE — TELEPHONE ENCOUNTER
Called patient and advised per Dr. Jennifer Corrales note,  That Dr. Ashley Gotti agrees with Dr. Melody Holcomb consult to Dr. Jagdish Benedict. Informed Dr. Ashley Gotti thinks she should follow up with Dr. Domenica Magdaleno with questions.   Patient states she feels this is very serious and she w

## 2019-06-27 ENCOUNTER — APPOINTMENT (OUTPATIENT)
Dept: PHYSICAL THERAPY | Facility: HOSPITAL | Age: 44
End: 2019-06-27
Attending: FAMILY MEDICINE
Payer: COMMERCIAL

## 2019-07-05 ENCOUNTER — HOSPITAL ENCOUNTER (OUTPATIENT)
Dept: PHYSICAL THERAPY | Facility: HOSPITAL | Age: 44
Setting detail: THERAPIES SERIES
Discharge: HOME | End: 2019-07-05
Attending: FAMILY MEDICINE
Payer: COMMERCIAL

## 2019-07-05 DIAGNOSIS — M62.838 TRAPEZIUS MUSCLE SPASM: ICD-10-CM

## 2019-07-05 PROCEDURE — 97140 MANUAL THERAPY 1/> REGIONS: CPT

## 2019-07-05 PROCEDURE — 97162 PT EVAL MOD COMPLEX 30 MIN: CPT

## 2019-07-05 PROCEDURE — 97110 THERAPEUTIC EXERCISES: CPT

## 2019-07-05 NOTE — PROGRESS NOTES
SPINE EVALUATION:   Referring Physician: Dr. Kathleen Grant  Diagnosis: Trapezius muscle spasm (N80.951)     Date of Service: 7/5/2019     PATIENT SUMMARY   Colby Herbert is a 53year old female who presents to therapy today with complaints of L-sided neck and dysfunction. Pt and PT discussed evaluation findings, pathology, POC and HEP. Pt voiced understanding and performs HEP correctly without reported pain.  Skilled Physical Therapy is medically necessary to address the above impairments and reach functional g scapular retraction (form correction); chin tucks (form correction)  Pt education was provided on exam findings, treatment diagnosis, treatment plan, expectations, and prognosis.  Pt was also provided recommendations for postural corrections and ergonomics Exercise Program instruction    Education or treatment limitation: None  Rehab Potential:good    FOTO: 53/100    Patient/Family/Caregiver was advised of these findings, precautions, and treatment options and has agreed to actively participate in planning a

## 2019-07-08 ENCOUNTER — HOSPITAL ENCOUNTER (OUTPATIENT)
Dept: PHYSICAL THERAPY | Facility: HOSPITAL | Age: 44
Setting detail: THERAPIES SERIES
Discharge: HOME | End: 2019-07-08
Attending: FAMILY MEDICINE
Payer: COMMERCIAL

## 2019-07-08 DIAGNOSIS — M62.838 TRAPEZIUS MUSCLE SPASM: ICD-10-CM

## 2019-07-08 PROCEDURE — 97110 THERAPEUTIC EXERCISES: CPT | Performed by: PHYSICAL THERAPIST

## 2019-07-08 PROCEDURE — 97140 MANUAL THERAPY 1/> REGIONS: CPT | Performed by: PHYSICAL THERAPIST

## 2019-07-08 NOTE — PROGRESS NOTES
Dx: Trapezius muscle spasm (X33.898) *         Insurance (Authorized # of Visits):  6        Authorizing Physician: Dr. Griselda Melendez  Next MD visit: none scheduled  Fall Risk: standard         Precautions: n/a             Subjective: Cameron Adames has told me that I nee upper thoracic /  Date: 7/8/2019  TX#: 2/8 Date:                 TX#: 3/ Date:                 TX#: 4/ Date:                 TX#: 5/ Date:    Tx#: 6/   foam beam x10 reps super 6       stm to cervical spine including suboccipitals, UT, levator  Medial  scap

## 2019-07-12 ENCOUNTER — HOSPITAL ENCOUNTER (OUTPATIENT)
Dept: PHYSICAL THERAPY | Facility: HOSPITAL | Age: 44
Setting detail: THERAPIES SERIES
Discharge: HOME | End: 2019-07-12
Attending: FAMILY MEDICINE
Payer: COMMERCIAL

## 2019-07-12 DIAGNOSIS — M62.838 TRAPEZIUS MUSCLE SPASM: ICD-10-CM

## 2019-07-12 PROCEDURE — 97140 MANUAL THERAPY 1/> REGIONS: CPT | Performed by: PHYSICAL THERAPIST

## 2019-07-12 NOTE — PROGRESS NOTES
Dx: Trapezius muscle spasm (C07.989) *         Insurance (Authorized # of Visits):  6        Authorizing Physician: Dr. Shabana Connor  Next MD visit: none scheduled  Fall Risk: standard         Precautions: n/a             Subjective: Have started to grade my pap thoracic Cont with PT as outlined by POC. Date: 7/8/2019  TX#: 2/8 Date:     7/12/19            TX#: 3/ Date:                 TX#: 4/ Date:                 TX#: 5/ Date:    Tx#: 6/   foam beam x10 reps super 6 stm to cervical spine including suboccipitals

## 2019-07-15 ENCOUNTER — HOSPITAL ENCOUNTER (OUTPATIENT)
Dept: PHYSICAL THERAPY | Facility: HOSPITAL | Age: 44
Setting detail: THERAPIES SERIES
Discharge: HOME | End: 2019-07-15
Attending: FAMILY MEDICINE
Payer: COMMERCIAL

## 2019-07-15 ENCOUNTER — TELEPHONE (OUTPATIENT)
Dept: FAMILY MEDICINE CLINIC | Facility: CLINIC | Age: 44
End: 2019-07-15

## 2019-07-15 DIAGNOSIS — M62.838 TRAPEZIUS MUSCLE SPASM: ICD-10-CM

## 2019-07-15 PROCEDURE — 97140 MANUAL THERAPY 1/> REGIONS: CPT

## 2019-07-15 PROCEDURE — 97110 THERAPEUTIC EXERCISES: CPT

## 2019-07-15 NOTE — PROGRESS NOTES
Dx: Trapezius muscle spasm (G01.327) *         Insurance (Authorized # of Visits):  6        Authorizing Physician: Dr. Griselda Melendez  Next MD visit: none scheduled  Fall Risk: standard         Precautions: n/a             Subjective: Feels a bit more pain today. release  L upper trapezius STM     stm to cervical spine including suboccipitals, UT, levator  Medial  scap border x20' Cervical spine isometrics x6' 1st rib caudal glide in supine and in sitting     Cervical spine isometrics x6' sidelying  scap clock exer

## 2019-07-16 ENCOUNTER — TELEPHONE (OUTPATIENT)
Dept: OBGYN CLINIC | Facility: CLINIC | Age: 44
End: 2019-07-16

## 2019-07-16 ENCOUNTER — PATIENT MESSAGE (OUTPATIENT)
Dept: OBGYN CLINIC | Facility: CLINIC | Age: 44
End: 2019-07-16

## 2019-07-16 DIAGNOSIS — D25.9 UTERINE LEIOMYOMA, UNSPECIFIED LOCATION: Primary | ICD-10-CM

## 2019-07-16 NOTE — TELEPHONE ENCOUNTER
From: Matilda Gregory  To: Marilu Chacon MD  Sent: 7/16/2019 12:49 AM CDT  Subject: Test Results Question    Dr. Raghu Osorio. I saw Dr. Mirella Pat. He said the decision to have surgery to have my tumor removed is mine.  He said if I was unsure, I shoul

## 2019-07-17 ENCOUNTER — HOSPITAL ENCOUNTER (OUTPATIENT)
Dept: PHYSICAL THERAPY | Facility: HOSPITAL | Age: 44
Setting detail: THERAPIES SERIES
Discharge: HOME | End: 2019-07-17
Attending: FAMILY MEDICINE
Payer: COMMERCIAL

## 2019-07-17 DIAGNOSIS — M62.838 TRAPEZIUS MUSCLE SPASM: ICD-10-CM

## 2019-07-17 PROCEDURE — 97140 MANUAL THERAPY 1/> REGIONS: CPT

## 2019-07-17 PROCEDURE — 97110 THERAPEUTIC EXERCISES: CPT

## 2019-07-17 NOTE — PROGRESS NOTES
Dx: Trapezius muscle spasm (B34.204) *         Insurance (Authorized # of Visits):  6        Authorizing Physician: Dr. Jaz Briggs  Next MD visit: none scheduled  Fall Risk: standard         Precautions: n/a             Subjective: Has been feeling crackling i lower cervical and upper thoracic Cont with PT as outlined by POC. Date: 7/8/2019  TX#: 2/8 Date:     7/12/19            TX#: 3/ Date:  7/15/19               TX#: 4/8 Date:  7/17/19               TX#: 5/8 Date:    Tx#: 6/   foam beam x10 reps super 6 stm

## 2019-07-18 ENCOUNTER — OFFICE VISIT (OUTPATIENT)
Dept: PODIATRY CLINIC | Facility: CLINIC | Age: 44
End: 2019-07-18

## 2019-07-18 DIAGNOSIS — M72.2 PLANTAR FASCIITIS OF LEFT FOOT: ICD-10-CM

## 2019-07-18 DIAGNOSIS — M79.672 HEEL PAIN, CHRONIC, LEFT: Primary | ICD-10-CM

## 2019-07-18 DIAGNOSIS — G89.29 HEEL PAIN, CHRONIC, LEFT: Primary | ICD-10-CM

## 2019-07-18 DIAGNOSIS — M77.32 CALCANEAL SPUR OF LEFT FOOT: ICD-10-CM

## 2019-07-18 PROCEDURE — 99213 OFFICE O/P EST LOW 20 MIN: CPT | Performed by: PODIATRIST

## 2019-07-19 ENCOUNTER — MED REC SCAN ONLY (OUTPATIENT)
Dept: OBGYN CLINIC | Facility: CLINIC | Age: 44
End: 2019-07-19

## 2019-07-20 NOTE — PROGRESS NOTES
Cha Grewal is a 53year old female. Patient presents with:  Heel Pain: left f/u - had cortisone inj on 6/20/19 - it hurts at night when laying down, during the day is okay. HPI:   This patient returns to the clinic still having some pain.   Kim Dawson 1997    Laparoscopy      Family History   Problem Relation Age of Onset   • Diabetes Father    • Diabetes Mother    • Other (digestive disorder) Mother         gallbladder   • Other (HTN) Mother    • Other (digestive disorder) Sister         gallbladder Neurologic: Patient has intact sensorium with no deficits noted. 4. Musculoskeletal: Patient still has mild tenderness on palpation of the left heel however does not seem to be acute and she does not wish another cortisone injection.     ASSESSMENT AND PL

## 2019-07-22 ENCOUNTER — APPOINTMENT (OUTPATIENT)
Dept: PHYSICAL THERAPY | Facility: HOSPITAL | Age: 44
End: 2019-07-22
Attending: FAMILY MEDICINE
Payer: COMMERCIAL

## 2019-07-24 ENCOUNTER — HOSPITAL ENCOUNTER (OUTPATIENT)
Dept: PHYSICAL THERAPY | Facility: HOSPITAL | Age: 44
Setting detail: THERAPIES SERIES
Discharge: HOME | End: 2019-07-24
Attending: FAMILY MEDICINE
Payer: COMMERCIAL

## 2019-07-24 DIAGNOSIS — M62.838 TRAPEZIUS MUSCLE SPASM: ICD-10-CM

## 2019-07-24 PROCEDURE — 97110 THERAPEUTIC EXERCISES: CPT

## 2019-07-24 PROCEDURE — 97140 MANUAL THERAPY 1/> REGIONS: CPT

## 2019-07-24 NOTE — PROGRESS NOTES
Dx: Trapezius muscle spasm (X77.437) *         Insurance (Authorized # of Visits):  6        Authorizing Physician: Dr. Heidy Posey  Next MD visit: none scheduled  Fall Risk: standard         Precautions: n/a             Subjective: Has been having some pain in TX#: 4/8 Date:  7/17/19               TX#: 5/8 Date: 7/24/19  Tx#: 6/8   foam beam x10 reps super 6 stm to cervical spine including suboccipitals, UT, levator  Medial  scap border x20 Suboccipital release  L upper trapezius STM Suboccipital release  L u

## 2019-07-31 ENCOUNTER — HOSPITAL ENCOUNTER (OUTPATIENT)
Dept: ULTRASOUND IMAGING | Age: 44
Discharge: HOME | End: 2019-07-31
Attending: OBSTETRICS & GYNECOLOGY
Payer: COMMERCIAL

## 2019-07-31 DIAGNOSIS — D25.9 UTERINE LEIOMYOMA, UNSPECIFIED LOCATION: ICD-10-CM

## 2019-07-31 PROCEDURE — 76830 TRANSVAGINAL US NON-OB: CPT | Performed by: OBSTETRICS & GYNECOLOGY

## 2019-07-31 PROCEDURE — 76856 US EXAM PELVIC COMPLETE: CPT | Performed by: OBSTETRICS & GYNECOLOGY

## 2019-08-01 ENCOUNTER — HOSPITAL ENCOUNTER (OUTPATIENT)
Dept: PHYSICAL THERAPY | Facility: HOSPITAL | Age: 44
Setting detail: THERAPIES SERIES
Discharge: HOME | End: 2019-08-01
Attending: FAMILY MEDICINE
Payer: COMMERCIAL

## 2019-08-01 PROCEDURE — 97110 THERAPEUTIC EXERCISES: CPT

## 2019-08-01 PROCEDURE — 97140 MANUAL THERAPY 1/> REGIONS: CPT

## 2019-08-01 NOTE — PROGRESS NOTES
Progress Summary    Pt has attended 7 visits in Physical Therapy. Subjective: Shamir hopkins reports some improvement with her symptoms. She states that pain used to be constant but now it is intermittent.  When she does experience pain it is of the same i 5/5    Rhomboids: R 3+/5, L 3+/5*  Mid trap: R 3/5; L 3-/5*     Flexibility:   UE/Scapular   Upper Trap: R moderate restrictions; L moderate restrictions  Levator Scap: R moderate restrictions; L moderate restrictions   Pec Major: R moderate restrictions; [phone]. I certify the need for these services furnished under this plan of treatment and while under my care. X___________________________________________________ Date____________________    Certification From: 2/9/9512  To:10/30/2019        . rolls up the wall YTB rows  YTB scapular retraction with Er      Corner stretching  1/2 foam roll thoracic spine extension  Wall push ups DNF\"s training      Seated Red TB Ext Rot with scapular retraction  Seated Red TB rows -- HEP update   HEP: see previ

## 2019-08-01 NOTE — PROGRESS NOTES
Patient notified. Patient verbalized understanding. Pt has seen Dr. Jagdish Benedict and is scheduled for surgery on 8/13/19. Pt had second US at the recommendation of Dr. Jagdish Benedict in order to compare size of mass from first [address] [facility]. Routed to Dr. Domenica Magdaleno.   Pt is requ

## 2019-08-06 ENCOUNTER — TELEPHONE (OUTPATIENT)
Dept: FAMILY MEDICINE CLINIC | Facility: CLINIC | Age: 44
End: 2019-08-06

## 2019-08-06 ENCOUNTER — LABORATORY ENCOUNTER (OUTPATIENT)
Dept: LAB | Age: 44
End: 2019-08-06
Attending: NURSE PRACTITIONER
Payer: COMMERCIAL

## 2019-08-06 ENCOUNTER — OFFICE VISIT (OUTPATIENT)
Dept: FAMILY MEDICINE CLINIC | Facility: CLINIC | Age: 44
End: 2019-08-06

## 2019-08-06 VITALS
SYSTOLIC BLOOD PRESSURE: 130 MMHG | RESPIRATION RATE: 16 BRPM | DIASTOLIC BLOOD PRESSURE: 76 MMHG | TEMPERATURE: 98 F | HEART RATE: 78 BPM | OXYGEN SATURATION: 98 % | WEIGHT: 235 LBS | BODY MASS INDEX: 44.37 KG/M2 | HEIGHT: 61 IN

## 2019-08-06 DIAGNOSIS — Z01.818 PRE-OPERATIVE EXAM: ICD-10-CM

## 2019-08-06 DIAGNOSIS — R94.31 ABNORMAL EKG: ICD-10-CM

## 2019-08-06 DIAGNOSIS — I10 ESSENTIAL HYPERTENSION: Primary | ICD-10-CM

## 2019-08-06 DIAGNOSIS — D25.9 UTERINE LEIOMYOMA, UNSPECIFIED LOCATION: ICD-10-CM

## 2019-08-06 PROBLEM — R06.09 DOE (DYSPNEA ON EXERTION): Status: ACTIVE | Noted: 2017-09-09

## 2019-08-06 PROBLEM — R06.00 DOE (DYSPNEA ON EXERTION): Status: ACTIVE | Noted: 2017-09-09

## 2019-08-06 LAB
ANION GAP SERPL CALC-SCNC: 8 MMOL/L (ref 0–18)
BASOPHILS # BLD AUTO: 0.08 X10(3) UL (ref 0–0.2)
BASOPHILS NFR BLD AUTO: 0.9 %
BUN BLD-MCNC: 15 MG/DL (ref 7–18)
BUN/CREAT SERPL: 22.4 (ref 10–20)
CALCIUM BLD-MCNC: 8.6 MG/DL (ref 8.5–10.1)
CHLORIDE SERPL-SCNC: 106 MMOL/L (ref 98–112)
CO2 SERPL-SCNC: 23 MMOL/L (ref 21–32)
CREAT BLD-MCNC: 0.67 MG/DL (ref 0.55–1.02)
DEPRECATED RDW RBC AUTO: 48.9 FL (ref 35.1–46.3)
EOSINOPHIL # BLD AUTO: 0.13 X10(3) UL (ref 0–0.7)
EOSINOPHIL NFR BLD AUTO: 1.5 %
ERYTHROCYTE [DISTWIDTH] IN BLOOD BY AUTOMATED COUNT: 16.8 % (ref 11–15)
GLUCOSE BLD-MCNC: 96 MG/DL (ref 70–99)
HCT VFR BLD AUTO: 43.6 % (ref 35–48)
HGB BLD-MCNC: 13.6 G/DL (ref 12–16)
IMM GRANULOCYTES # BLD AUTO: 0.04 X10(3) UL (ref 0–1)
IMM GRANULOCYTES NFR BLD: 0.5 %
LYMPHOCYTES # BLD AUTO: 1.97 X10(3) UL (ref 1–4)
LYMPHOCYTES NFR BLD AUTO: 23.3 %
MCH RBC QN AUTO: 25.5 PG (ref 26–34)
MCHC RBC AUTO-ENTMCNC: 31.2 G/DL (ref 31–37)
MCV RBC AUTO: 81.6 FL (ref 80–100)
MONOCYTES # BLD AUTO: 0.53 X10(3) UL (ref 0.1–1)
MONOCYTES NFR BLD AUTO: 6.3 %
NEUTROPHILS # BLD AUTO: 5.7 X10 (3) UL (ref 1.5–7.7)
NEUTROPHILS # BLD AUTO: 5.7 X10(3) UL (ref 1.5–7.7)
NEUTROPHILS NFR BLD AUTO: 67.5 %
OSMOLALITY SERPL CALC.SUM OF ELEC: 285 MOSM/KG (ref 275–295)
PLATELET # BLD AUTO: 384 10(3)UL (ref 150–450)
POTASSIUM SERPL-SCNC: 4.2 MMOL/L (ref 3.5–5.1)
RBC # BLD AUTO: 5.34 X10(6)UL (ref 3.8–5.3)
SODIUM SERPL-SCNC: 137 MMOL/L (ref 136–145)
WBC # BLD AUTO: 8.5 X10(3) UL (ref 4–11)

## 2019-08-06 PROCEDURE — 99244 OFF/OP CNSLTJ NEW/EST MOD 40: CPT | Performed by: FAMILY MEDICINE

## 2019-08-06 PROCEDURE — 85025 COMPLETE CBC W/AUTO DIFF WBC: CPT

## 2019-08-06 PROCEDURE — 93000 ELECTROCARDIOGRAM COMPLETE: CPT | Performed by: FAMILY MEDICINE

## 2019-08-06 PROCEDURE — 80048 BASIC METABOLIC PNL TOTAL CA: CPT

## 2019-08-06 NOTE — TELEPHONE ENCOUNTER
[address] Heart office to schedule patient for appt. For cardiac evaluation prior to surgery due to abnormal EKG. Unable to schedule appointment with any provider at [facility] prior to surgery.   Able to schedule appt for this Friday 8/9/19,

## 2019-08-06 NOTE — TELEPHONE ENCOUNTER
Referral paperwork, OV notes and EKGs faxed to Dr. Sonny Pickard office, ATTN: Viridiana Sandoval at [phone]. Confirmation fax received.

## 2019-08-06 NOTE — PATIENT INSTRUCTIONS
Stop ibuprofen or aspirin, tylenol is ok before surgery. Refer to cardiology for clearance. ASAP. (scheduled Friday 8/9 with DR. Aj [address] today. You can take your blood pressure med the night before surgery.

## 2019-08-06 NOTE — PROGRESS NOTES
Rina Gilman is as 53year old female.  Patient presents with:  Pre-Op Exam       Surgeon: Janina Villegas  Procedure: Robotic assisted laparoscopic total hysterectomy with BSO  Location: [facility]  Date: 8/13/19    We were requested by the surge Medications:       Current Outpatient Medications:  Biotin (BIOTIN 5000) 5 MG Oral Cap Take 1 capsule by mouth daily.  Disp:  Rfl:    Irbesartan-hydroCHLOROthiazide 150-12.5 MG Oral Tab TK 1 T PO D Disp: 90 tablet Rfl: 1   Ibuprofen (ADVIL) 200 MG Oral Cap phone: Not on file        Gets together: Not on file        Attends Muslim service: Not on file        Active member of club or organization: Not on file        Attends meetings of clubs or organizations: Not on file        Relationship status: Not on f No chest wall tenderness. Lungs clear, no rales, wheezes or rhonchi. Heart: tachycardic rate and regular rhythm, S1 and S2 normal without S3, S4 or murmur. Abdomen: Nondistended, nontender, no organomegaly or mass. No rebound or percussion tenderness. 60%.     CORONARY ANGIOGRAPHY:  The left main coronary artery is normal.  Left anterior descending coronary artery has minimal nonobstructive disease with a 15% to 20% proximal plaque. The distal LAD appears free of significant disease.   A moderate sized Normal  rest/stress gated SPECT myocardial perfusion scan. 2. Hyperdynamic left ventricular systolic function.    3. Indeterminate electrocardiographic response to exercise due to the development of a rate-related left bundle branch block.      I have sen

## 2019-08-07 PROBLEM — Z01.810 PREOP CARDIOVASCULAR EXAM: Status: ACTIVE | Noted: 2019-08-07

## 2019-08-07 PROBLEM — I15.9 SECONDARY HYPERTENSION: Status: ACTIVE | Noted: 2019-08-07

## 2019-08-07 ASSESSMENT — ENCOUNTER SYMPTOMS
SYNCOPE: 0
DIZZINESS: 0
ABDOMINAL PAIN: 0
NEAR-SYNCOPE: 0
ALTERED MENTAL STATUS: 0
COLOR CHANGE: 0
PARESTHESIAS: 0
NAUSEA: 0
NUMBNESS: 0
CHILLS: 0
SHORTNESS OF BREATH: 0
VOMITING: 0

## 2019-08-08 ENCOUNTER — ANESTHESIA EVENT (OUTPATIENT)
Dept: SURGERY | Facility: HOSPITAL | Age: 44
End: 2019-08-08
Payer: COMMERCIAL

## 2019-08-08 NOTE — PAT NURSING NOTE
Chart reviewed by anesthesiologist, Dr. Ruslan Cowart for abnormal EKG. Received an order for cardiac clearance. Faxed this request to the surgeon and Dr. Martin Miranda. Received fax confirmation.  Per [facility], patient has an appointment with Dr. Martin Miranda for clearance tomorro

## 2019-08-09 ENCOUNTER — OFFICE VISIT (OUTPATIENT)
Dept: CARDIOLOGY | Age: 44
End: 2019-08-09

## 2019-08-09 ENCOUNTER — TELEPHONE (OUTPATIENT)
Dept: FAMILY MEDICINE CLINIC | Facility: CLINIC | Age: 44
End: 2019-08-09

## 2019-08-09 ENCOUNTER — TELEPHONE (OUTPATIENT)
Dept: CARDIOLOGY | Age: 44
End: 2019-08-09

## 2019-08-09 VITALS
HEIGHT: 61 IN | WEIGHT: 234.5 LBS | SYSTOLIC BLOOD PRESSURE: 148 MMHG | BODY MASS INDEX: 44.27 KG/M2 | DIASTOLIC BLOOD PRESSURE: 90 MMHG | HEART RATE: 80 BPM

## 2019-08-09 DIAGNOSIS — I10 ESSENTIAL HYPERTENSION: ICD-10-CM

## 2019-08-09 DIAGNOSIS — I15.9 SECONDARY HYPERTENSION: ICD-10-CM

## 2019-08-09 DIAGNOSIS — Z01.810 PREOP CARDIOVASCULAR EXAM: Primary | ICD-10-CM

## 2019-08-09 DIAGNOSIS — R94.31 ABNORMAL EKG: Primary | ICD-10-CM

## 2019-08-09 PROCEDURE — 3077F SYST BP >= 140 MM HG: CPT | Performed by: INTERNAL MEDICINE

## 2019-08-09 PROCEDURE — 99244 OFF/OP CNSLTJ NEW/EST MOD 40: CPT | Performed by: INTERNAL MEDICINE

## 2019-08-09 RX ORDER — IRBESARTAN AND HYDROCHLOROTHIAZIDE 150; 12.5 MG/1; MG/1
TABLET, FILM COATED ORAL
COMMUNITY
Start: 2019-05-24 | End: 2019-12-06 | Stop reason: DRUGHIGH

## 2019-08-09 RX ORDER — IBUPROFEN 200 MG
200 TABLET ORAL PRN
COMMUNITY
End: 2021-09-22

## 2019-08-09 RX ORDER — VITAMIN B COMPLEX/MINERALS
TABLET ORAL DAILY
COMMUNITY
End: 2021-09-22

## 2019-08-09 SDOH — HEALTH STABILITY: MENTAL HEALTH: HOW OFTEN DO YOU HAVE A DRINK CONTAINING ALCOHOL?: NEVER

## 2019-08-09 NOTE — TELEPHONE ENCOUNTER
KENA Carey at Dr. Rick Hoyt office and informed Nuclear Stress Test was changed to Dr. Jude Bae as the ordering doctor. Advised per [employer] the test does need authorization, and requested Dr. Rick Hoyt office obtain the authorization.

## 2019-08-09 NOTE — TELEPHONE ENCOUNTER
Edwige Carey at Dr. Keya Weeks office. States she has attempted to enter an Exercise Nuclear Stress Test under Dr. Keya Weeks name, but she cannot get his name to go into the order in epic.  States it is for an Exercise Nuclear Stress Test to be done at [facility]

## 2019-08-09 NOTE — TELEPHONE ENCOUNTER
Aurelio from [facility] , she asked that dr Светлана Moncada send a order for a nuc stress test for pre-op , she said that for some reason dr Martha ricci order it , so she is asking dr Светлана Moncada order it asap.       You can fax it to [phone]

## 2019-08-10 ENCOUNTER — EXTERNAL RECORD (OUTPATIENT)
Dept: CARDIOLOGY | Age: 44
End: 2019-08-10

## 2019-08-10 ENCOUNTER — HOSPITAL ENCOUNTER (OUTPATIENT)
Dept: CV DIAGNOSTICS | Facility: HOSPITAL | Age: 44
Discharge: HOME | End: 2019-08-10
Attending: INTERNAL MEDICINE
Payer: COMMERCIAL

## 2019-08-10 DIAGNOSIS — R94.31 ABNORMAL EKG: ICD-10-CM

## 2019-08-10 DIAGNOSIS — Z01.810 PREOP CARDIOVASCULAR EXAM: ICD-10-CM

## 2019-08-10 DIAGNOSIS — I15.9 SECONDARY HYPERTENSION: ICD-10-CM

## 2019-08-10 DIAGNOSIS — I10 ESSENTIAL HYPERTENSION: ICD-10-CM

## 2019-08-10 PROCEDURE — 93018 CV STRESS TEST I&R ONLY: CPT | Performed by: INTERNAL MEDICINE

## 2019-08-10 PROCEDURE — 78452 HT MUSCLE IMAGE SPECT MULT: CPT | Performed by: INTERNAL MEDICINE

## 2019-08-10 PROCEDURE — 93017 CV STRESS TEST TRACING ONLY: CPT | Performed by: INTERNAL MEDICINE

## 2019-08-12 RX ORDER — IRBESARTAN AND HYDROCHLOROTHIAZIDE 150; 12.5 MG/1; MG/1
1 TABLET, FILM COATED ORAL DAILY
Qty: 90 TABLET | Refills: 0 | Status: SHIPPED | OUTPATIENT
Start: 2019-08-12 | End: 2019-10-10

## 2019-08-12 NOTE — ANESTHESIA PREPROCEDURE EVALUATION
PRE-OP EVALUATION    Patient Name: Gene Guevara    Pre-op Diagnosis: FIBROIDS, DYSMENHORRHEA    Procedure(s):  ROBOTIC ASSISTED LAPAROSCOPIC TOTAL HYSTERECTOMY,BILATERAL SALPINGECTOMY    Surgeon(s) and Role:     Deirdre Andrade MD - Primary    Pre- Procedure Laterality Date   • ANGIOGRAM     • CHOLECYSTECTOMY  2005    laparoscopic, Dr. Zahida Frederick   • [facility]    Laparoscopy     Social History    Tobacco Use      Smoking status: Never Smoker      Smokeless tobacco: Never Used

## 2019-08-12 NOTE — H&P
[facility]    PATIENT'S NAME: Jojo Marcano   ATTENDING PHYSICIAN: Vero Li M.D.    PATIENT ACCOUNT#:   [de-identified]    LOCATION:    MEDICAL RECORD #:   [id number]       YOB: 1975  ADMISSION DATE:       08/13/2019    HISTORY AN M.D.  d: 08/12/2019 16:07:54  t: 08/12/2019 16:18:47  [facility] [id number]  WE/

## 2019-08-12 NOTE — TELEPHONE ENCOUNTER
LOV 8/19    LAST LAB 8/19    LAST RX   Irbesartan-hydroCHLOROthiazide 150-12.5 MG Oral Tab 90 tablet 1 5/24/2019       Next OV Visit date not found      PROTOCOL  Hypertension Medications Protocol Passed    Refilled x 3 months.

## 2019-08-13 ENCOUNTER — ANESTHESIA (OUTPATIENT)
Dept: SURGERY | Facility: HOSPITAL | Age: 44
End: 2019-08-13
Payer: COMMERCIAL

## 2019-08-13 ENCOUNTER — HOSPITAL ENCOUNTER (OUTPATIENT)
Facility: HOSPITAL | Age: 44
Discharge: HOME | End: 2019-08-17
Attending: OBSTETRICS & GYNECOLOGY | Admitting: OBSTETRICS & GYNECOLOGY
Payer: COMMERCIAL

## 2019-08-13 DIAGNOSIS — I10 ESSENTIAL HYPERTENSION: ICD-10-CM

## 2019-08-13 LAB
POCT LOT NUMBER: NORMAL
POCT URINE PREGNANCY: NEGATIVE

## 2019-08-13 PROCEDURE — 0UT74ZZ RESECTION OF BILATERAL FALLOPIAN TUBES, PERCUTANEOUS ENDOSCOPIC APPROACH: ICD-10-PCS | Performed by: OBSTETRICS & GYNECOLOGY

## 2019-08-13 PROCEDURE — 8E0W4CZ ROBOTIC ASSISTED PROCEDURE OF TRUNK REGION, PERCUTANEOUS ENDOSCOPIC APPROACH: ICD-10-PCS | Performed by: OBSTETRICS & GYNECOLOGY

## 2019-08-13 PROCEDURE — 0UT94ZZ RESECTION OF UTERUS, PERCUTANEOUS ENDOSCOPIC APPROACH: ICD-10-PCS | Performed by: OBSTETRICS & GYNECOLOGY

## 2019-08-13 RX ORDER — ENOXAPARIN SODIUM 100 MG/ML
0.5 INJECTION SUBCUTANEOUS DAILY
Status: DISCONTINUED | OUTPATIENT
Start: 2019-08-13 | End: 2019-08-14

## 2019-08-13 RX ORDER — CEFAZOLIN SODIUM/WATER 2 G/20 ML
2 SYRINGE (ML) INTRAVENOUS ONCE
Status: COMPLETED | OUTPATIENT
Start: 2019-08-13 | End: 2019-08-13

## 2019-08-13 RX ORDER — ONDANSETRON 2 MG/ML
4 INJECTION INTRAMUSCULAR; INTRAVENOUS EVERY 8 HOURS PRN
Status: DISCONTINUED | OUTPATIENT
Start: 2019-08-13 | End: 2019-08-17

## 2019-08-13 RX ORDER — NALOXONE HYDROCHLORIDE 0.4 MG/ML
80 INJECTION, SOLUTION INTRAMUSCULAR; INTRAVENOUS; SUBCUTANEOUS AS NEEDED
Status: DISCONTINUED | OUTPATIENT
Start: 2019-08-13 | End: 2019-08-13 | Stop reason: HOSPADM

## 2019-08-13 RX ORDER — DIPHENHYDRAMINE HYDROCHLORIDE 50 MG/ML
12.5 INJECTION INTRAMUSCULAR; INTRAVENOUS EVERY 4 HOURS PRN
Status: DISCONTINUED | OUTPATIENT
Start: 2019-08-13 | End: 2019-08-17

## 2019-08-13 RX ORDER — CEFAZOLIN SODIUM/WATER 2 G/20 ML
2 SYRINGE (ML) INTRAVENOUS EVERY 8 HOURS
Status: COMPLETED | OUTPATIENT
Start: 2019-08-13 | End: 2019-08-13

## 2019-08-13 RX ORDER — BUPIVACAINE HYDROCHLORIDE 5 MG/ML
INJECTION, SOLUTION EPIDURAL; INTRACAUDAL AS NEEDED
Status: DISCONTINUED | OUTPATIENT
Start: 2019-08-13 | End: 2019-08-13 | Stop reason: HOSPADM

## 2019-08-13 RX ORDER — CEFAZOLIN SODIUM/WATER 2 G/20 ML
SYRINGE (ML) INTRAVENOUS
Status: DISPENSED
Start: 2019-08-13 | End: 2019-08-13

## 2019-08-13 RX ORDER — PROCHLORPERAZINE 25 MG
25 SUPPOSITORY, RECTAL RECTAL EVERY 12 HOURS PRN
Status: DISCONTINUED | OUTPATIENT
Start: 2019-08-13 | End: 2019-08-17

## 2019-08-13 RX ORDER — ACETAMINOPHEN 10 MG/ML
INJECTION, SOLUTION INTRAVENOUS
Status: DISCONTINUED | OUTPATIENT
Start: 2019-08-13 | End: 2019-08-13 | Stop reason: HOSPADM

## 2019-08-13 RX ORDER — CEFAZOLIN SODIUM/WATER 2 G/20 ML
2 SYRINGE (ML) INTRAVENOUS EVERY 8 HOURS
Status: DISCONTINUED | OUTPATIENT
Start: 2019-08-13 | End: 2019-08-13

## 2019-08-13 RX ORDER — SODIUM CHLORIDE, SODIUM LACTATE, POTASSIUM CHLORIDE, CALCIUM CHLORIDE 600; 310; 30; 20 MG/100ML; MG/100ML; MG/100ML; MG/100ML
INJECTION, SOLUTION INTRAVENOUS CONTINUOUS
Status: DISCONTINUED | OUTPATIENT
Start: 2019-08-13 | End: 2019-08-13 | Stop reason: HOSPADM

## 2019-08-13 RX ORDER — LABETALOL HYDROCHLORIDE 5 MG/ML
5 INJECTION, SOLUTION INTRAVENOUS EVERY 5 MIN PRN
Status: DISCONTINUED | OUTPATIENT
Start: 2019-08-13 | End: 2019-08-13 | Stop reason: HOSPADM

## 2019-08-13 RX ORDER — ACETAMINOPHEN 500 MG
1000 TABLET ORAL EVERY 6 HOURS PRN
Status: DISCONTINUED | OUTPATIENT
Start: 2019-08-13 | End: 2019-08-17

## 2019-08-13 RX ORDER — IRBESARTAN AND HYDROCHLOROTHIAZIDE 150; 12.5 MG/1; MG/1
1 TABLET, FILM COATED ORAL DAILY
Status: DISCONTINUED | OUTPATIENT
Start: 2019-08-13 | End: 2019-08-13 | Stop reason: SDUPTHER

## 2019-08-13 RX ORDER — ACETAMINOPHEN 500 MG
1000 TABLET ORAL ONCE
Status: DISCONTINUED | OUTPATIENT
Start: 2019-08-13 | End: 2019-08-13 | Stop reason: HOSPADM

## 2019-08-13 RX ORDER — ACETAMINOPHEN 500 MG
500 TABLET ORAL EVERY 6 HOURS PRN
Status: DISCONTINUED | OUTPATIENT
Start: 2019-08-13 | End: 2019-08-17

## 2019-08-13 RX ORDER — SODIUM CHLORIDE, SODIUM LACTATE, POTASSIUM CHLORIDE, CALCIUM CHLORIDE 600; 310; 30; 20 MG/100ML; MG/100ML; MG/100ML; MG/100ML
INJECTION, SOLUTION INTRAVENOUS CONTINUOUS
Status: DISCONTINUED | OUTPATIENT
Start: 2019-08-13 | End: 2019-08-15

## 2019-08-13 RX ORDER — MEPERIDINE HYDROCHLORIDE 25 MG/ML
12.5 INJECTION INTRAMUSCULAR; INTRAVENOUS; SUBCUTANEOUS AS NEEDED
Status: DISCONTINUED | OUTPATIENT
Start: 2019-08-13 | End: 2019-08-13 | Stop reason: HOSPADM

## 2019-08-13 RX ORDER — PROCHLORPERAZINE MALEATE 10 MG
10 TABLET ORAL EVERY 12 HOURS PRN
Status: DISCONTINUED | OUTPATIENT
Start: 2019-08-13 | End: 2019-08-17

## 2019-08-13 RX ORDER — ONDANSETRON 2 MG/ML
4 INJECTION INTRAMUSCULAR; INTRAVENOUS AS NEEDED
Status: DISCONTINUED | OUTPATIENT
Start: 2019-08-13 | End: 2019-08-13 | Stop reason: HOSPADM

## 2019-08-13 RX ORDER — KETOROLAC TROMETHAMINE 30 MG/ML
30 INJECTION, SOLUTION INTRAMUSCULAR; INTRAVENOUS EVERY 6 HOURS PRN
Status: DISCONTINUED | OUTPATIENT
Start: 2019-08-13 | End: 2019-08-15

## 2019-08-13 RX ORDER — ZOLPIDEM TARTRATE 5 MG/1
5 TABLET ORAL NIGHTLY PRN
Status: DISCONTINUED | OUTPATIENT
Start: 2019-08-13 | End: 2019-08-17

## 2019-08-13 RX ORDER — ONDANSETRON 4 MG/1
4 TABLET, FILM COATED ORAL EVERY 8 HOURS PRN
Status: DISCONTINUED | OUTPATIENT
Start: 2019-08-13 | End: 2019-08-17

## 2019-08-13 RX ORDER — ACETAMINOPHEN 500 MG
1000 TABLET ORAL ONCE
COMMUNITY
End: 2019-10-10 | Stop reason: ALTCHOICE

## 2019-08-13 RX ORDER — HYDROMORPHONE HYDROCHLORIDE 1 MG/ML
0.4 INJECTION, SOLUTION INTRAMUSCULAR; INTRAVENOUS; SUBCUTANEOUS EVERY 5 MIN PRN
Status: DISCONTINUED | OUTPATIENT
Start: 2019-08-13 | End: 2019-08-13 | Stop reason: HOSPADM

## 2019-08-13 RX ORDER — DEXTROSE, SODIUM CHLORIDE, AND POTASSIUM CHLORIDE 5; .9; .15 G/100ML; G/100ML; G/100ML
INJECTION INTRAVENOUS CONTINUOUS
Status: DISCONTINUED | OUTPATIENT
Start: 2019-08-13 | End: 2019-08-17

## 2019-08-13 NOTE — PROGRESS NOTES
[facility]  Brief Op Note    Armando Gregory Location: OR   [facility] [id number] MRN [id number]   Admission Date 8/13/2019 Operation Date 8/13/2019   Attending Physician Laureen Thomas MD Operating Physician Padmini Gonzalez MD     Pre-Operative Diagnosi

## 2019-08-13 NOTE — PROGRESS NOTES
Vss. Pt drowsy resting in bed. Pt awakens easily and able to answer questions appropriately. Pt on 3l02 with 02 sats wnl. Lungs cta. Pt denies difficulty breathing or sob. Pt denies n/v at present. Abdomen rounded but soft. bs hypoactive on auscultaiton.  P

## 2019-08-13 NOTE — ANESTHESIA POSTPROCEDURE EVALUATION
[facility] Gregory Patient Status:  Outpatient in a Bed   Age/Gender 53year old female MRN [id number]   [facility] SURGERY Attending Izella Boas, MD   [address] Day # 0 PCP Maggie Durán MD       Anesthesia Post-op Note

## 2019-08-13 NOTE — PROGRESS NOTES
[facility] Progress Note:  Anticoagulation Weight Dose Adjustment for enoxaparin (Dianna Herb)    Cha Grewal is a 53year old female who has been prescribed enoxaparin (LOVENOX) for VTE prophylaxis.       Estimated Creatinine Clearance: 79.2 mL/min (base

## 2019-08-14 LAB
DEPRECATED RDW RBC AUTO: 47.4 FL (ref 35.1–46.3)
ERYTHROCYTE [DISTWIDTH] IN BLOOD BY AUTOMATED COUNT: 16.3 % (ref 11–15)
HCT VFR BLD AUTO: 34.7 % (ref 35–48)
HGB BLD-MCNC: 10.9 G/DL (ref 12–16)
MCH RBC QN AUTO: 25.2 PG (ref 26–34)
MCHC RBC AUTO-ENTMCNC: 31.4 G/DL (ref 31–37)
MCV RBC AUTO: 80.1 FL (ref 80–100)
PLATELET # BLD AUTO: 339 10(3)UL (ref 150–450)
RBC # BLD AUTO: 4.33 X10(6)UL (ref 3.8–5.3)
WBC # BLD AUTO: 10.1 X10(3) UL (ref 4–11)

## 2019-08-14 PROCEDURE — 99243 OFF/OP CNSLTJ NEW/EST LOW 30: CPT | Performed by: INTERNAL MEDICINE

## 2019-08-14 RX ORDER — IRBESARTAN AND HYDROCHLOROTHIAZIDE 150; 12.5 MG/1; MG/1
1 TABLET, FILM COATED ORAL DAILY
Status: DISCONTINUED | OUTPATIENT
Start: 2019-08-14 | End: 2019-08-14

## 2019-08-14 RX ORDER — IRBESARTAN AND HYDROCHLOROTHIAZIDE 150; 12.5 MG/1; MG/1
1 TABLET, FILM COATED ORAL DAILY
Status: DISCONTINUED | OUTPATIENT
Start: 2019-08-14 | End: 2019-08-17

## 2019-08-14 RX ORDER — LABETALOL HYDROCHLORIDE 5 MG/ML
10 INJECTION, SOLUTION INTRAVENOUS EVERY 6 HOURS PRN
Status: DISCONTINUED | OUTPATIENT
Start: 2019-08-14 | End: 2019-08-17

## 2019-08-14 RX ORDER — ENOXAPARIN SODIUM 100 MG/ML
0.5 INJECTION SUBCUTANEOUS EVERY EVENING
Status: DISCONTINUED | OUTPATIENT
Start: 2019-08-14 | End: 2019-08-17

## 2019-08-14 NOTE — CONSULTS
[facility]  [address] Gregory Patient Status:  Outpatient in a Bed    1975 MRN [id number]   [facility] 3NW-A Attending Lubna Mcdaniel MD   Hosp Day # 0 PCP Charley Viveros MD     Reason for consult: Hypertensi (postoperative nausea and vomiting)    • Unspecified essential hypertension     also hx essential HTN, benign   • Unspecified pruritic disorder    • Vertigo         Past Surgical History:   Past Surgical History:   Procedure Laterality Date   • ANGIOGRAM 44.61 kg/m²   General: No acute distress. Alert and oriented x 3. HEENT: Normocephalic atraumatic. Moist mucous membranes. EOM-I. PERRLA. Anicteric. Neck: No lymphadenopathy. No JVD. No carotid bruits. Respiratory: Clear to auscultation bilaterally.  No

## 2019-08-14 NOTE — PROGRESS NOTES
[facility]  Progress Note    Matt Gregory Patient Status:  Outpatient in a Bed    1975 MRN [id number]   [facility] 3NW-A Attending Jennifer Cerda MD   Hosp Day # 0 PCP Leann Tabares MD       SUBJECTIVE:  Comfortable, no (TYLENOL EXTRA STRENGTH) tab 1,000 mg 1,000 mg Oral Q6H PRN   ketorolac tromethamine (TORADOL) 30 MG/ML injection 30 mg 30 mg Intravenous Q6H PRN         Assessment/Plan:   1.   Stable postop     Dispo: Home today    Questions/concerns were discussed with erik

## 2019-08-14 NOTE — PROGRESS NOTES
Vss. Pt bp elevated this am at 169/78. Pt c/o pain to lower abdomen. toradol given with some relief. Pt assisted to bedside chair without difficulty. Dtv. Iv sl. Lap sites intact. bp rechecked and was 180/78. Patient would like to take own home medication.

## 2019-08-14 NOTE — PLAN OF CARE
Pt is A&O x4. VSS and afebrile. O2 sats WNL on room air, lung sounds clear, diminished in bases bilaterally. C/o moderate abdominal pain, verbalized good relief with Toradol PRN. Abdomen is soft, rounded and tender. Bowel sounds active x4.  Reports burping mobilization and activity  - Obtain nutritional consult as needed  - Establish a toileting routine/schedule  - Consider collaborating with pharmacy to review patient's medication profile  Outcome: Progressing     Problem: GENITOURINARY - ADULT  Goal: Absen appropriate  - Administer supportive blood products/factors as ordered and appropriate  Outcome: Progressing  Goal: Free from bleeding injury  Description  (Example usage: patient with low platelets)  INTERVENTIONS:  - Avoid intramuscular injections, enema

## 2019-08-14 NOTE — OPERATIVE REPORT
[facility]    PATIENT'S NAME: Veronica Everett   ATTENDING PHYSICIAN: Stanislav Ledezma M.D. OPERATING PHYSICIAN: Stanislav Ledezma M.D.    PATIENT ACCOUNT#:   [de-identified]    LOCATION:  PACU [facility] PACU 4 EDWP 10  MEDICAL RECORD #:   [id number]       DATE Bu Ajay Dissection was started first to dissect both fallopian tubes from the fimbrial end, leaving them attached to the uterus.   Then, dissection was started on the right side by clamping and coagulating round ligament, opening the anterior leaf of the broad liga Then, abdominal cavity was again insufflated and robot was docked. Then, closure of the vaginal vault was performed using V-Loc system. Areas of our dissection were inspected. Complete hemostasis was obtained.   Pelvis was irrigated, all the instruments

## 2019-08-15 PROCEDURE — 99213 OFFICE O/P EST LOW 20 MIN: CPT | Performed by: INTERNAL MEDICINE

## 2019-08-15 RX ORDER — HYDROCHLOROTHIAZIDE 12.5 MG/1
12.5 CAPSULE, GELATIN COATED ORAL EVERY MORNING
Qty: 30 CAPSULE | Refills: 0 | Status: SHIPPED | OUTPATIENT
Start: 2019-08-15 | End: 2019-10-10

## 2019-08-15 RX ORDER — KETOROLAC TROMETHAMINE 30 MG/ML
INJECTION, SOLUTION INTRAMUSCULAR; INTRAVENOUS
Status: DISCONTINUED
Start: 2019-08-15 | End: 2019-08-15

## 2019-08-15 RX ORDER — KETOROLAC TROMETHAMINE 30 MG/ML
30 INJECTION, SOLUTION INTRAMUSCULAR; INTRAVENOUS EVERY 6 HOURS PRN
Status: DISCONTINUED | OUTPATIENT
Start: 2019-08-15 | End: 2019-08-17

## 2019-08-15 RX ORDER — HYDRALAZINE HYDROCHLORIDE 20 MG/ML
10 INJECTION INTRAMUSCULAR; INTRAVENOUS EVERY 6 HOURS PRN
Status: DISCONTINUED | OUTPATIENT
Start: 2019-08-15 | End: 2019-08-17

## 2019-08-15 NOTE — PROGRESS NOTES
[facility]  Progress Note     Shamir Gregory Patient Status:  Outpatient in a Bed    1975 MRN [id number]   [facility] 3NW-A Attending Fernando Mckeon MD   Hosp Day # 0 PCP Nora Sawant MD     Chief Complaint: HTN     S Irbesartan/HCTZ. May add additional 12.5 of HCTZ if continues to be hypertensive   2. Suspect elevated BP is 2/2 pain response and IV Toradol (NSAID) use  3.  She is allergies to other forms of pain control (narcotics) and Tylenol is not effective in [place]

## 2019-08-15 NOTE — PLAN OF CARE
Problem: Patient/Family Goals  Goal: Patient/Family Long Term Goal  Description  Patient's Long Term Goal: Discharge home    Interventions:  - Pain tolerable  - Tolerating diet  - Return to previous ADLs  - See additional Care Plan goals for specific int Progressing     Problem: METABOLIC/FLUID AND ELECTROLYTES - ADULT  Goal: Electrolytes maintained within normal limits  Description  INTERVENTIONS:  - Monitor labs and rhythm and assess patient for signs and symptoms of electrolyte imbalances  - Administer applied, writing RN explained the benefits of wearing SCD's to the patient and the risks associated with not wearing them, patient verbalizes understanding and agreeable to wearing SCD's.  Patient will maintain oxygen saturation at/above 93% on room air and

## 2019-08-15 NOTE — PROGRESS NOTES
Patient called to Dr Kellie Eubanks to stay another night in hospital. Dr Kellie Eubanks gave torb order to cancel discharge. Pt updated.

## 2019-08-15 NOTE — PLAN OF CARE
Pt is A&O x4. Afebrile. Hypertensive. C/o moderate abdominal pain that is exacerbated with movement/activity, offering pain meds as needed per [facility]. O2 sats WNL on room air, lung sounds clear but diminished in bases bilaterally.   Abdomen is soft, rounded an bowel function  Description  INTERVENTIONS:  - Assess bowel function  - Maintain adequate hydration with IV or PO as ordered and tolerated  - Evaluate effectiveness of GI medications  - Encourage mobilization and activity  - Obtain nutritional consult as n Assess for signs and symptoms of bleeding or hemorrhage  - Monitor labs and vital signs for trends  - Administer supportive blood products/factors, fluids and medications as ordered and appropriate  - Administer supportive blood products/factors as ordered

## 2019-08-15 NOTE — CM/SW NOTE
08/15/19 1700   CM/SW Screening   Referral Source Physician   Information Source Chart review;Nursing rounds   Patient's Mental Status Alert;Oriented       HOME SITUATION  Type of Home: House   Home Layout: Two level  Stairs to Enter : 2  Railing: No  S

## 2019-08-15 NOTE — PHYSICAL THERAPY NOTE
PHYSICAL THERAPY EVALUATION - INPATIENT     Room Number: 325/325-A  Evaluation Date: 8/15/2019  Type of Evaluation: Initial  Physician Order: PT Eval and Treat    Presenting Problem: s/p robotic-assistesd hysterecomty and bilateral salpingectomy (8/1 staying with her at D/C)  Drives: Yes  Patient Owned Equipment: None       Prior Level of [place]: Pt reports independence for all functional mobilities, ADLs, and IADLs without use of assistive device. Denies falls.  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[address] Triston Cordova and Latha Ambrocio at a railing?: Total       AM-PAC Score:  Raw Score: 15   Approx Degree of Impairment: 57.7%   Standardized Score (AM-PAC Scale): 39.45   CMS Modifier (G-Code): CK    FUNCTIONAL ABILITY STATUS  Gait Assessment   Gait Assistance: Dependent assistance(actual: s Exercise/Education Provided:  Bed mobility  Body mechanics  Functional activity tolerated  Gait training  Transfer training    Patient End of Session: In bed;Needs met;Call light within reach;RN aware of session/findings; All patient questions and con device: none at assistance level: supervision     Goal #4 Pt will negotiate 1 flight of stairs with use of railing and supervision. Goal #5 Pt will negotiate 1 stoop step with supervision.     Goal #6    Goal Comments: Goals established on 8/15/2019

## 2019-08-15 NOTE — PROGRESS NOTES
Pt did not walk today nor use IS-rationale explained but still declined. Pt showered with minimal assist, elaina well. Hydralazine given for b/p as ordered. Requested toradol-reordered per Dr Lino Sandoval. Will continue to monitor.

## 2019-08-16 PROCEDURE — 99225 SUBSEQUENT OBSERVATION CARE: CPT | Performed by: INTERNAL MEDICINE

## 2019-08-16 RX ORDER — FENTANYL 25 UG/H
1 PATCH TRANSDERMAL
Status: DISCONTINUED | OUTPATIENT
Start: 2019-08-16 | End: 2019-08-17

## 2019-08-16 RX ORDER — NALOXONE HYDROCHLORIDE 4 MG/.1ML
4 SPRAY, METERED NASAL AS NEEDED
Qty: 1 KIT | Refills: 0 | Status: SHIPPED | OUTPATIENT
Start: 2019-08-16 | End: 2019-08-17

## 2019-08-16 RX ORDER — FENTANYL 25 UG/H
1 PATCH TRANSDERMAL
Qty: 5 PATCH | Refills: 0 | Status: SHIPPED | OUTPATIENT
Start: 2019-08-19 | End: 2019-08-17

## 2019-08-16 NOTE — PHYSICAL THERAPY NOTE
Attempted to see Pt this AM - RN aware of attempt. Pt is currently up in bathroom, up ambulating sans AD. Will f/u later today if time permits, after all other patients are attempted per tentative schedule.

## 2019-08-16 NOTE — PROGRESS NOTES
Assumed care of patient at [address] [place] [address]. Patient A&O, VSS. PRN pain medication given with good relief. Regular diet, with no reports of nausea. RA. Voiding freely and without difficulty. Abdominal lap sites clean, dry, and intact. Peripheral IV saline locked.  Carol

## 2019-08-16 NOTE — PROGRESS NOTES
[facility]  Progress Note     Ksenia Gregory Patient Status:  Outpatient in a Bed    1975 MRN [id number]   [facility] 3NW-A Attending Bobby Khan MD   Hosp Day # 0 PCP Kimberly Chu MD     Chief Complaint: HTN     S 12.5 of HCTZ to be taken on an as needed basis. 2. 2/2 elevated BP is 2/2 pain response and IV Toradol (NSAID) use  3. Discussed in detail importance of NSAID regulation as she has been chronically dependent on Ibuprofen for endometriosis pain.  She should

## 2019-08-16 NOTE — PLAN OF CARE
Assumed care of patient at this time. Patient resting in bed comfortably, received Ambien by prior RN,  Will wait until patient awakes to completed nursing assessment. 0400: Patient up to bathroom w/steady gait.   Tolerating general diet, + flatus & bm adequate hydration with IV or PO as ordered and tolerated  - Evaluate effectiveness of GI medications  - Encourage mobilization and activity  - Obtain nutritional consult as needed  - Establish a toileting routine/schedule  - Consider collaborating with ph

## 2019-08-17 VITALS
BODY MASS INDEX: 44.58 KG/M2 | DIASTOLIC BLOOD PRESSURE: 93 MMHG | OXYGEN SATURATION: 97 % | RESPIRATION RATE: 20 BRPM | SYSTOLIC BLOOD PRESSURE: 147 MMHG | TEMPERATURE: 98 F | WEIGHT: 236.13 LBS | HEART RATE: 108 BPM | HEIGHT: 61 IN

## 2019-08-17 PROCEDURE — 99225 SUBSEQUENT OBSERVATION CARE: CPT | Performed by: INTERNAL MEDICINE

## 2019-08-17 NOTE — PLAN OF CARE
PT STATES SHE WILL D/C HOME TODAY AND THAT HER  MOM HAS HIRED A CARE GIVER FOR HOME. PT AMBULATING IN ROOM WITHOUT ASSIT. PT REQUESTING HELP TO WIPE HER  VERA AREA. PT STATES SHE IS IN TOO MUCH MUCH TO WIPE HERSELF.

## 2019-08-17 NOTE — PROGRESS NOTES
[facility]  Progress Note    Matt Gregory Patient Status:  Outpatient in a Bed    1975 MRN [id number]   [facility] 3NW-A Attending Jennifer Cerda MD   Hosp Day # 0 PCP Leann Tabares MD       SUBJECTIVE:  Still complaini diphenhydrAMINE HCl (BENADRYL) injection 12.5 mg 12.5 mg Intravenous Q4H PRN   acetaminophen (TYLENOL EXTRA STRENGTH) tab 500 mg 500 mg Oral Q6H PRN   Or      acetaminophen (TYLENOL EXTRA STRENGTH) tab 1,000 mg 1,000 mg Oral Q6H PRN         Assessment/Pl

## 2019-08-17 NOTE — PROGRESS NOTES
Writing RN agrees with orientating MARTHA Hanks's assessment. Plan of care discussed with patient. All questions answered. Care endorsed to Bobbi Card RN and Anthnoy Ferraro RN.

## 2019-08-17 NOTE — PLAN OF CARE
Problem: Patient/Family Goals  Goal: Patient/Family Long Term Goal  Description  Patient's Long Term Goal: Discharge home    Interventions:  - Pain tolerable  - Tolerating diet  - Return to previous ADLs  - See additional Care Plan goals for specific int Progressing     Problem: METABOLIC/FLUID AND ELECTROLYTES - ADULT  Goal: Electrolytes maintained within normal limits  Description  INTERVENTIONS:  - Monitor labs and rhythm and assess patient for signs and symptoms of electrolyte imbalances  - Administer Assess patient's functional ability and stability  - Promote increasing activity/tolerance for mobility and gait  - Educate and engage patient/family in tolerated activity level and precautions     Outcome: Progressing     Problem: PAIN - ADULT  Goal: Verb

## 2019-08-17 NOTE — PROGRESS NOTES
[facility]  Progress Note    Eduardo Gregory Patient Status:  Outpatient in a Bed    1975 MRN [id number]   [facility] 3NW-A Attending Lubna Mcdaniel MD   Hosp Day # 0 PCP Charley Viveros MD       SUBJECTIVE:  Complaining of Rectal Q12H PRN   diphenhydrAMINE HCl (BENADRYL) injection 12.5 mg 12.5 mg Intravenous Q4H PRN   acetaminophen (TYLENOL EXTRA STRENGTH) tab 500 mg 500 mg Oral Q6H PRN   Or      acetaminophen (TYLENOL EXTRA STRENGTH) tab 1,000 mg 1,000 mg Oral Q6H PRN

## 2019-08-17 NOTE — PROGRESS NOTES
[facility]  Progress Note     Lorin Steffany Gregory Patient Status:  Outpatient in a Bed    1975 MRN [id number]   [facility] 3NW-A Attending Christina Murillo MD   Hosp Day # 0 PCP Lali Diez MD     Chief Complaint: HTN     S PLAN:     1. HTN  1. Continue same dose of Irbesartan/HCTZ. Add additional 12.5 of HCTZ to be taken on an as needed basis. 2. 2/2 elevated BP is 2/2 pain response and IV Toradol (NSAID) use  3.  Discussed in detail importance of NSAID regulation as she has

## 2019-08-17 NOTE — DISCHARGE SUMMARY
Pa patient's discharge was delayed because of her complaint of pain. We tried to make arrangements for rehab but she did not qualify.

## 2019-08-17 NOTE — CM/SW NOTE
SW received order to assist with \"rehab placemenrt\". SW spoke to RN- pt to dc home today. Her parents hired a caregiver to assist her at home. Khalida Sommer, [address] [facility] /Dischage Planner  [phone]  Pager [phone]

## 2019-08-17 NOTE — PLAN OF CARE
Pt alert and orientatedx4. Room air. Is in room. Refused SCDs and refused lovenox. Regular diet. Rule out C-diff, contact isolation. Voiding. No BM this shift, not passing gas. Saline locked. Up ad shannon. Pain controlled with tylenol. Denies N/V.  Had fentayl activity  - Obtain nutritional consult as needed  - Establish a toileting routine/schedule  - Consider collaborating with pharmacy to review patient's medication profile  Outcome: Progressing     Problem: GENITOURINARY - ADULT  Goal: Absence of urinary ret supportive blood products/factors as ordered and appropriate  Outcome: Progressing  Goal: Free from bleeding injury  Description  (Example usage: patient with low platelets)  INTERVENTIONS:  - Avoid intramuscular injections, enemas and rectal medication ad

## 2019-08-17 NOTE — PROGRESS NOTES
Writing RN agrees with Marian Ko RNs nursing assesment and POC. Patient tolerating general diet, + flatus, no bms since earlier in day.  Isolation precautions maintained until 1000am.   Up w/ minimal assist, sitting in recliner appears comfortable on laptop

## 2019-08-17 NOTE — PLAN OF CARE
Written and verbal discharge instructions given to patient and verbalize understanding. IV discontinued in angio-cath tip intact, site free from redness, swelling, or drainage, patient denies pain at site. Dressing applied. No prescription given.   Patient

## 2019-08-17 NOTE — PROGRESS NOTES
Discussed POC with patient including MD order for discharge. Patient became upset stating that she is in too much pain to go home and she will be unable to function at home. MD notified. Patient also stating she is having diarrhea.

## 2019-09-06 ENCOUNTER — TELEPHONE (OUTPATIENT)
Dept: FAMILY MEDICINE CLINIC | Facility: CLINIC | Age: 44
End: 2019-09-06

## 2019-09-06 NOTE — TELEPHONE ENCOUNTER
Pt left a voicemail (had to understand) stating she was going to have a f/u with the surgeon.  Wants to know if she needs to schedule a after surgery with Dr Junior Ruiz with Clinical nurse & she said no but suggested to get Pt scheduled for her Physical

## 2019-09-10 NOTE — DISCHARGE SUMMARY
[facility]    PATIENT'S NAME: Christ Chinchilla   ATTENDING PHYSICIAN: Sofia Carlin M.D.    PATIENT ACCOUNT#:   [de-identified]    LOCATION:  [address] [place] [address]  MEDICAL RECORD #:   [id number]       YOB: 1975  ADMISSION DATE:       08/13/20

## 2019-10-08 ENCOUNTER — TELEPHONE (OUTPATIENT)
Dept: FAMILY MEDICINE CLINIC | Facility: CLINIC | Age: 44
End: 2019-10-08

## 2019-10-08 NOTE — TELEPHONE ENCOUNTER
Patient said she had surgery a couple days ago and since then her blood pressure has been running high but it has been running really high the past couple days and she is really scared .

## 2019-10-08 NOTE — TELEPHONE ENCOUNTER
Called patient who states her surgery was 8/13/19. Her B/P was running high after surgery and was sent home with Rx and instructions to take extra dose of HCTZ along with normal medication if B/P remains high.  (500-454/30) Patient has not scheduled a [facility]

## 2019-10-10 ENCOUNTER — OFFICE VISIT (OUTPATIENT)
Dept: FAMILY MEDICINE CLINIC | Facility: CLINIC | Age: 44
End: 2019-10-10

## 2019-10-10 VITALS
SYSTOLIC BLOOD PRESSURE: 143 MMHG | BODY MASS INDEX: 44.75 KG/M2 | TEMPERATURE: 99 F | HEART RATE: 102 BPM | WEIGHT: 237 LBS | HEIGHT: 61 IN | OXYGEN SATURATION: 99 % | DIASTOLIC BLOOD PRESSURE: 98 MMHG | RESPIRATION RATE: 16 BRPM

## 2019-10-10 DIAGNOSIS — R06.00 DYSPNEA ON EXERTION: ICD-10-CM

## 2019-10-10 DIAGNOSIS — Z13.29 SCREENING FOR THYROID DISORDER: ICD-10-CM

## 2019-10-10 DIAGNOSIS — Z13.0 SCREENING FOR DEFICIENCY ANEMIA: ICD-10-CM

## 2019-10-10 DIAGNOSIS — Z12.31 ENCOUNTER FOR SCREENING MAMMOGRAM FOR BREAST CANCER: ICD-10-CM

## 2019-10-10 DIAGNOSIS — I10 ESSENTIAL HYPERTENSION: Primary | ICD-10-CM

## 2019-10-10 DIAGNOSIS — Z13.1 SCREENING FOR DIABETES MELLITUS: ICD-10-CM

## 2019-10-10 DIAGNOSIS — Z13.220 SCREENING, LIPID: ICD-10-CM

## 2019-10-10 DIAGNOSIS — I44.7 LBBB (LEFT BUNDLE BRANCH BLOCK): ICD-10-CM

## 2019-10-10 PROBLEM — Z90.710 STATUS POST TOTAL ABDOMINAL HYSTERECTOMY AND BILATERAL SALPINGO-OOPHORECTOMY (TAH-BSO): Status: ACTIVE | Noted: 2019-10-10

## 2019-10-10 PROBLEM — Z90.722 STATUS POST TOTAL ABDOMINAL HYSTERECTOMY AND BILATERAL SALPINGO-OOPHORECTOMY (TAH-BSO): Status: ACTIVE | Noted: 2019-10-10

## 2019-10-10 PROBLEM — Z90.79 STATUS POST TOTAL ABDOMINAL HYSTERECTOMY AND BILATERAL SALPINGO-OOPHORECTOMY (TAH-BSO): Status: ACTIVE | Noted: 2019-10-10

## 2019-10-10 PROCEDURE — 99214 OFFICE O/P EST MOD 30 MIN: CPT | Performed by: FAMILY MEDICINE

## 2019-10-10 RX ORDER — IRBESARTAN AND HYDROCHLOROTHIAZIDE 300; 12.5 MG/1; MG/1
1 TABLET, FILM COATED ORAL DAILY
Qty: 90 TABLET | Refills: 1 | Status: SHIPPED | OUTPATIENT
Start: 2019-10-10 | End: 2020-03-27

## 2019-10-10 RX ORDER — HYDROCHLOROTHIAZIDE 12.5 MG/1
12.5 CAPSULE, GELATIN COATED ORAL EVERY MORNING
Qty: 90 CAPSULE | Refills: 1 | Status: SHIPPED | OUTPATIENT
Start: 2019-10-10 | End: 2020-02-21

## 2019-10-10 NOTE — PROGRESS NOTES
Ryannestephanie Gregory IS A 53year old female HERE FOR Patient presents with:  Hypertension:  Weight gain and spotting. after the surgery.  dEnied flu shot  Wheezing  Shortness Of Breath  Psoriasis: getting bad       History of present illness:     Had some superf vomiting)    • Unspecified essential hypertension     also hx essential HTN, benign   • Unspecified pruritic disorder    • Vertigo        Past Surgical History:   Procedure Laterality Date   • ANGIOGRAM  2015   • CHOLECYSTECTOMY  2005    laparoscopic,  children: 0      Years of education: Not on file      Highest education level: Not on file    Occupational History      Occupation: Professor        Employer: [employer] resource strain: Not on file      [employer] Position: Sitting, Cuff Size: large)   Pulse 102   Temp 98.5 °F (36.9 °C) (Oral)   Resp 16   Ht 61\"   Wt 237 lb (107.5 kg)   LMP 07/21/2019 (Exact Date)   SpO2 99%   BMI 44.78 kg/m²      Lungs clear.   Heart regular rhythm no S3 S4 murmur  Abdomen scar wel

## 2019-10-10 NOTE — PATIENT INSTRUCTIONS
Cardiac rehab might be an option to monitor your activity level as you increase intensity of exercise. Ask cardiology. Check bp with forearm.     Recheck in 3-4 weeks after increase to 300 mg-12.5 mg irbesartan at night and 12.5 mg HCTZ in AM.    Physical n

## 2019-10-12 ENCOUNTER — TELEPHONE (OUTPATIENT)
Dept: OBGYN CLINIC | Facility: CLINIC | Age: 44
End: 2019-10-12

## 2019-11-07 ASSESSMENT — ENCOUNTER SYMPTOMS
SHORTNESS OF BREATH: 0
NUMBNESS: 0
COLOR CHANGE: 0
ABDOMINAL PAIN: 0
SYNCOPE: 0
NAUSEA: 0
CHILLS: 0
NEAR-SYNCOPE: 0
ALTERED MENTAL STATUS: 0
PARESTHESIAS: 0
DIZZINESS: 0
VOMITING: 0

## 2019-11-08 ENCOUNTER — APPOINTMENT (OUTPATIENT)
Dept: CARDIOLOGY | Age: 44
End: 2019-11-08

## 2019-11-22 ENCOUNTER — OFFICE VISIT (OUTPATIENT)
Dept: FAMILY MEDICINE CLINIC | Facility: CLINIC | Age: 44
End: 2019-11-22

## 2019-11-22 VITALS
DIASTOLIC BLOOD PRESSURE: 80 MMHG | OXYGEN SATURATION: 100 % | TEMPERATURE: 99 F | RESPIRATION RATE: 16 BRPM | HEART RATE: 87 BPM | SYSTOLIC BLOOD PRESSURE: 134 MMHG

## 2019-11-22 DIAGNOSIS — E66.01 OBESITY, CLASS III, BMI 40-49.9 (MORBID OBESITY) (HCC): Primary | ICD-10-CM

## 2019-11-22 DIAGNOSIS — M62.81 MUSCLE LEFT ARM WEAKNESS: ICD-10-CM

## 2019-11-22 DIAGNOSIS — F41.1 GENERALIZED ANXIETY DISORDER: ICD-10-CM

## 2019-11-22 PROCEDURE — 99213 OFFICE O/P EST LOW 20 MIN: CPT | Performed by: FAMILY MEDICINE

## 2019-11-22 NOTE — PATIENT INSTRUCTIONS
Refer Dr. Rosemary Saavedra at weight loss clinic, ask about non-medication (or minimal medication)  Physical therapy referral for arm. [facility] navigator (psychological support) for weight loss    Sent message to DR. Hill about BP management.      Federico cantu

## 2019-11-22 NOTE — PROGRESS NOTES
David Gregory IS A 53year old female HERE FOR Patient presents with:  Hypertension: follow up. Denied flu shot. History of present illness:     Home 155/88, 137/88, 168/104, 156/97, 166/91, 180/94, 166/98 ,161/91, 163/99, 151/82.      Stopped extra and growing fibroid that proved benign   • ROBOT-ASSISTED LAPAROSCOPIC HYSTERECTOMY Bilateral 8/13/2019    Performed by Bindu Veliz MD at [facility] MAIN OR       Current medications:     Current Outpatient Medications   Medication Sig Dispense Refill   • G tobacco: Never Used    Substance and Sexual Activity      Alcohol use: No      Drug use: No      Sexual activity: Not Currently        Partners: Male    Lifestyle      Physical activity:        Days per week: Not on file        Minutes per session: Not on weakness  -     OP REFERRAL TO [facility]    Generalized anxiety disorder  -     [facility] NAVIGATOR          Patient Instructions   Refer Dr. Carlos Torres at weight loss clinic, ask about non-medication (or minimal medication)  Physical therapy refe

## 2019-12-03 ENCOUNTER — TELEPHONE (OUTPATIENT)
Dept: SCHEDULING | Age: 44
End: 2019-12-03

## 2019-12-03 ENCOUNTER — OFFICE VISIT (OUTPATIENT)
Dept: FAMILY MEDICINE CLINIC | Facility: CLINIC | Age: 44
End: 2019-12-03

## 2019-12-03 ENCOUNTER — TELEPHONE (OUTPATIENT)
Dept: FAMILY MEDICINE CLINIC | Facility: CLINIC | Age: 44
End: 2019-12-03

## 2019-12-03 VITALS
TEMPERATURE: 98 F | RESPIRATION RATE: 16 BRPM | SYSTOLIC BLOOD PRESSURE: 112 MMHG | DIASTOLIC BLOOD PRESSURE: 80 MMHG | HEART RATE: 76 BPM

## 2019-12-03 DIAGNOSIS — H10.33 ACUTE BACTERIAL CONJUNCTIVITIS OF BOTH EYES: ICD-10-CM

## 2019-12-03 DIAGNOSIS — J01.00 ACUTE NON-RECURRENT MAXILLARY SINUSITIS: Primary | ICD-10-CM

## 2019-12-03 PROCEDURE — 99213 OFFICE O/P EST LOW 20 MIN: CPT | Performed by: NURSE PRACTITIONER

## 2019-12-03 RX ORDER — TOBRAMYCIN 3 MG/ML
SOLUTION/ DROPS OPHTHALMIC
Qty: 10 ML | Refills: 0 | Status: SHIPPED | OUTPATIENT
Start: 2019-12-03 | End: 2020-04-20 | Stop reason: ALTCHOICE

## 2019-12-03 RX ORDER — FLUTICASONE PROPIONATE 50 MCG
2 SPRAY, SUSPENSION (ML) NASAL DAILY
Qty: 1 BOTTLE | Refills: 0 | Status: SHIPPED | OUTPATIENT
Start: 2019-12-03 | End: 2020-01-02

## 2019-12-03 RX ORDER — AMOXICILLIN AND CLAVULANATE POTASSIUM 875; 125 MG/1; MG/1
1 TABLET, FILM COATED ORAL 2 TIMES DAILY
Qty: 20 TABLET | Refills: 0 | Status: SHIPPED | OUTPATIENT
Start: 2019-12-03 | End: 2019-12-13

## 2019-12-03 NOTE — TELEPHONE ENCOUNTER
Pt has congestion red icky, itchy eyes with discharge.  Wants antibiotic, suggested walk in, but wants to speak to nurse first. Feeling sick

## 2019-12-03 NOTE — TELEPHONE ENCOUNTER
Returned call to patient. [facility] advising she should go to [facility] today for evaluation and appropriate treatment.

## 2019-12-03 NOTE — PROGRESS NOTES
CHIEF COMPLAINT:   \" Patient presents with:  Sinus Problem  Ear Pain  Conjunctivitis    HPI:   Ruby Lam is a 53year old female who presents with a hx of cold and ear congestion which started 3 weeks ago.  Sx progressed to Maxillary sinus congestion Gastritis     mild   • KIMBERLEY(394.7)    • Helicobacter pylori (H. pylori) 3/2010    H. pylori (+)   • High-density lipoprotein deficiency     \"low HDL\"   • Hypertension    • Other ill-defined conditions(729.89)     \"Microcytic indices;  Needs labs done Date)   GENERAL: well developed, well nourished. SKIN: no rashes,no suspicious lesions  HEAD: atraumatic, normocephalic. EYES:  EOM intact, Palpebral conjunctivae are injected, bilaterally. Bulbar conjunctivae are clear, bilaterally.   No drainage pre

## 2019-12-04 ENCOUNTER — TELEPHONE (OUTPATIENT)
Dept: SCHEDULING | Age: 44
End: 2019-12-04

## 2019-12-05 ASSESSMENT — ENCOUNTER SYMPTOMS
SHORTNESS OF BREATH: 0
VOMITING: 0
CHILLS: 0
DIZZINESS: 0
NEAR-SYNCOPE: 0
SYNCOPE: 0
PARESTHESIAS: 0
NUMBNESS: 0
COLOR CHANGE: 0
NAUSEA: 0
ABDOMINAL PAIN: 0
ALTERED MENTAL STATUS: 0

## 2019-12-06 ENCOUNTER — OFFICE VISIT (OUTPATIENT)
Dept: CARDIOLOGY | Age: 44
End: 2019-12-06

## 2019-12-06 VITALS
HEART RATE: 76 BPM | DIASTOLIC BLOOD PRESSURE: 80 MMHG | BODY MASS INDEX: 44.93 KG/M2 | SYSTOLIC BLOOD PRESSURE: 122 MMHG | HEIGHT: 61 IN | WEIGHT: 238 LBS

## 2019-12-06 DIAGNOSIS — L40.9 PSORIASIS: ICD-10-CM

## 2019-12-06 DIAGNOSIS — I44.7 LBBB (LEFT BUNDLE BRANCH BLOCK): ICD-10-CM

## 2019-12-06 DIAGNOSIS — I15.9 SECONDARY HYPERTENSION: Primary | ICD-10-CM

## 2019-12-06 PROCEDURE — 3074F SYST BP LT 130 MM HG: CPT | Performed by: INTERNAL MEDICINE

## 2019-12-06 PROCEDURE — 99213 OFFICE O/P EST LOW 20 MIN: CPT | Performed by: INTERNAL MEDICINE

## 2019-12-06 PROCEDURE — 3079F DIAST BP 80-89 MM HG: CPT | Performed by: INTERNAL MEDICINE

## 2019-12-06 RX ORDER — IRBESARTAN AND HYDROCHLOROTHIAZIDE 300; 12.5 MG/1; MG/1
TABLET, FILM COATED ORAL
Refills: 1 | COMMUNITY
Start: 2019-10-10 | End: 2021-04-19

## 2019-12-06 RX ORDER — HYDROCHLOROTHIAZIDE 12.5 MG/1
CAPSULE, GELATIN COATED ORAL
Refills: 1 | COMMUNITY
Start: 2019-10-10 | End: 2020-06-12 | Stop reason: CLARIF

## 2019-12-06 RX ORDER — NICOTINE POLACRILEX 2 MG
GUM BUCCAL
COMMUNITY
End: 2021-09-22

## 2019-12-06 RX ORDER — AMOXICILLIN AND CLAVULANATE POTASSIUM 875; 125 MG/1; MG/1
TABLET, FILM COATED ORAL
Refills: 0 | COMMUNITY
Start: 2019-12-03 | End: 2020-06-12 | Stop reason: CLARIF

## 2019-12-06 RX ORDER — FLUTICASONE PROPIONATE 50 MCG
SPRAY, SUSPENSION (ML) NASAL
Refills: 0 | COMMUNITY
Start: 2019-12-03 | End: 2020-06-12 | Stop reason: CLARIF

## 2019-12-06 RX ORDER — TOBRAMYCIN 3 MG/ML
SOLUTION/ DROPS OPHTHALMIC
Refills: 0 | COMMUNITY
Start: 2019-12-03 | End: 2020-06-12 | Stop reason: CLARIF

## 2019-12-06 SDOH — HEALTH STABILITY: PHYSICAL HEALTH: ON AVERAGE, HOW MANY MINUTES DO YOU ENGAGE IN EXERCISE AT THIS LEVEL?: PATIENT DECLINED

## 2019-12-06 SDOH — HEALTH STABILITY: PHYSICAL HEALTH
ON AVERAGE, HOW MANY DAYS PER WEEK DO YOU ENGAGE IN MODERATE TO STRENUOUS EXERCISE (LIKE A BRISK WALK)?: PATIENT DECLINED

## 2019-12-06 SDOH — HEALTH STABILITY: MENTAL HEALTH: HOW OFTEN DO YOU HAVE A DRINK CONTAINING ALCOHOL?: NEVER

## 2019-12-06 ASSESSMENT — PATIENT HEALTH QUESTIONNAIRE - PHQ9
SUM OF ALL RESPONSES TO PHQ9 QUESTIONS 1 AND 2: 1
1. LITTLE INTEREST OR PLEASURE IN DOING THINGS: NOT AT ALL
2. FEELING DOWN, DEPRESSED OR HOPELESS: SEVERAL DAYS
SUM OF ALL RESPONSES TO PHQ9 QUESTIONS 1 AND 2: 1

## 2020-01-03 ENCOUNTER — TELEPHONE (OUTPATIENT)
Dept: FAMILY MEDICINE CLINIC | Facility: CLINIC | Age: 45
End: 2020-01-03

## 2020-01-03 NOTE — TELEPHONE ENCOUNTER
LVM for patient that the correct bill was dropped and insurance states she has a $20 copay for pre op appointments.

## 2020-01-03 NOTE — TELEPHONE ENCOUNTER
----- Message from [employer] sent at 12/19/2019 11:29 AM CST -----  Regarding: Billing Question  8/6/19 Pre Op exam was billed as Office Consultation so her insurance will not pay. Please change coding and resubmit.    Thank you

## 2020-01-16 ENCOUNTER — OFFICE VISIT (OUTPATIENT)
Dept: FAMILY MEDICINE CLINIC | Facility: CLINIC | Age: 45
End: 2020-01-16

## 2020-01-16 VITALS
DIASTOLIC BLOOD PRESSURE: 68 MMHG | SYSTOLIC BLOOD PRESSURE: 112 MMHG | RESPIRATION RATE: 16 BRPM | HEART RATE: 92 BPM | TEMPERATURE: 98 F | OXYGEN SATURATION: 99 %

## 2020-01-16 DIAGNOSIS — J32.9 CHRONIC CONGESTION OF PARANASAL SINUS: ICD-10-CM

## 2020-01-16 DIAGNOSIS — H69.83 DYSFUNCTION OF BOTH EUSTACHIAN TUBES: Primary | ICD-10-CM

## 2020-01-16 DIAGNOSIS — L40.9 PSORIASIS: ICD-10-CM

## 2020-01-16 DIAGNOSIS — H10.33 ACUTE CONJUNCTIVITIS OF BOTH EYES, UNSPECIFIED ACUTE CONJUNCTIVITIS TYPE: ICD-10-CM

## 2020-01-16 PROCEDURE — 99213 OFFICE O/P EST LOW 20 MIN: CPT | Performed by: FAMILY MEDICINE

## 2020-01-16 NOTE — PATIENT INSTRUCTIONS
Refer DR Luis M Weiss to evaluate your ears, hearing, postnasal drip and eustachian tube dysfunction. Also to do allergy testing. Refer Dr. Nevaeh Finn for psoriasis. Stop tobramycin drops.

## 2020-01-16 NOTE — PROGRESS NOTES
Radhika Gregory IS A 53year old female HERE FOR Patient presents with:  Ear Problem  Sinus Problem       History of present illness:     C/o sinus congestion, since before recent strep, x 2 months. ears seem like she's underwater, trying Flonase, took [employer] medications:     Current Outpatient Medications   Medication Sig Dispense Refill   • Tobramycin Sulfate 0.3 % Ophthalmic Solution Instill 2 drops to affected eye(s) every 2 hours while awake today, then instill 2 drops to affected eye(s) four times a day f Smoker      Smokeless tobacco: Never Used    Substance and Sexual Activity      Alcohol use: No      Drug use: No      Sexual activity: Not Currently        Partners: Male    Lifestyle      Physical activity:        Days per week: Not on file        Minute Plan:   Yemi Schaffer was seen today for ear problem and sinus problem.     Diagnoses and all orders for this visit:    Dysfunction of both eustachian tubes  -     ENT - INTERNAL    Chronic congestion of paranasal sinus    Psoriasis  -     DERM - INTERNAL    Ac

## 2020-02-15 ENCOUNTER — OFFICE VISIT (OUTPATIENT)
Dept: FAMILY MEDICINE CLINIC | Facility: CLINIC | Age: 45
End: 2020-02-15

## 2020-02-15 VITALS
HEART RATE: 97 BPM | RESPIRATION RATE: 16 BRPM | OXYGEN SATURATION: 98 % | TEMPERATURE: 98 F | DIASTOLIC BLOOD PRESSURE: 70 MMHG | SYSTOLIC BLOOD PRESSURE: 118 MMHG

## 2020-02-15 DIAGNOSIS — Z76.89 PATIENT CONCERN REGARDING DIABETES MELLITUS: ICD-10-CM

## 2020-02-15 DIAGNOSIS — H53.8 BLURRY VISION: Primary | ICD-10-CM

## 2020-02-15 DIAGNOSIS — J32.9 CHRONIC CONGESTION OF PARANASAL SINUS: ICD-10-CM

## 2020-02-15 DIAGNOSIS — R43.2 ALTERED TASTE: ICD-10-CM

## 2020-02-15 LAB
GLUCOSE BLOOD: 152
TEST STRIP LOT #: NORMAL NUMERIC

## 2020-02-15 PROCEDURE — 99213 OFFICE O/P EST LOW 20 MIN: CPT | Performed by: FAMILY MEDICINE

## 2020-02-15 PROCEDURE — 82962 GLUCOSE BLOOD TEST: CPT | Performed by: FAMILY MEDICINE

## 2020-02-15 NOTE — PATIENT INSTRUCTIONS
Do labs I'd ordered in October, that will tell if you have diabetes. Today's sugar is in normal 2 hour post-prandial (after-meal) range, 130-180. Normal A1c under 5.7, prediabetic 5.7-6.4, diabetic 6.5 & over, controlled diabetes 6.5-6.9.     I doubt

## 2020-02-15 NOTE — PROGRESS NOTES
Layman Carlos Gregory IS A 53year old female HERE FOR Patient presents with:  Diabetes: Pt says was feeling sugar when swollow and breathing. Blurred vision. History of present illness:     Feels like sugar coming out of body after eating.  Has burning se for large and growing fibroid that proved benign   • ROBOT-ASSISTED LAPAROSCOPIC HYSTERECTOMY Bilateral 8/13/2019    Performed by Lisa Cristina MD at [facility] MAIN OR       Current medications:     Current Outpatient Medications   Medication Sig Dispense Re Occupation: Professor        Employer: [employer] resource strain: Not on file      Food insecurity:        Worry: Not on file        Inability: Not on file      Transportation needs:        Medical: Not on file ENT exam not done as she is under treatment with ENT. Advised pt that sugar doesn't \"ooze from body\" and that a sugary taste in mouth is not a symptom of diabetes. Suggested that altered taste from the azelastine could explain her symptoms.  Encourage

## 2020-02-21 ENCOUNTER — OFFICE VISIT (OUTPATIENT)
Dept: INTERNAL MEDICINE CLINIC | Facility: CLINIC | Age: 45
End: 2020-02-21

## 2020-02-21 VITALS
DIASTOLIC BLOOD PRESSURE: 82 MMHG | HEART RATE: 97 BPM | RESPIRATION RATE: 16 BRPM | BODY MASS INDEX: 44.93 KG/M2 | WEIGHT: 238 LBS | SYSTOLIC BLOOD PRESSURE: 120 MMHG | HEIGHT: 61 IN

## 2020-02-21 DIAGNOSIS — F33.1 MAJOR DEPRESSIVE DISORDER, RECURRENT EPISODE, MODERATE (HCC): ICD-10-CM

## 2020-02-21 DIAGNOSIS — K76.0 NONALCOHOLIC FATTY LIVER DISEASE WITHOUT NONALCOHOLIC STEATOHEPATITIS (NASH): ICD-10-CM

## 2020-02-21 DIAGNOSIS — R73.03 PREDIABETES: ICD-10-CM

## 2020-02-21 DIAGNOSIS — Z51.81 ENCOUNTER FOR THERAPEUTIC DRUG MONITORING: Primary | ICD-10-CM

## 2020-02-21 DIAGNOSIS — I10 HYPERTENSION, UNSPECIFIED TYPE: ICD-10-CM

## 2020-02-21 PROBLEM — I15.9 SECONDARY HYPERTENSION: Status: ACTIVE | Noted: 2019-08-07

## 2020-02-21 PROBLEM — I44.7 LBBB (LEFT BUNDLE BRANCH BLOCK): Status: ACTIVE | Noted: 2019-12-06

## 2020-02-21 PROBLEM — Z01.810 PREOP CARDIOVASCULAR EXAM: Status: ACTIVE | Noted: 2019-08-07

## 2020-02-21 PROCEDURE — 99214 OFFICE O/P EST MOD 30 MIN: CPT | Performed by: NURSE PRACTITIONER

## 2020-02-21 NOTE — PROGRESS NOTES
HISTORY OF PRESENT ILLNESS  Patient presents with:  Weight Problem: Patient ref by Josiah Patel tried any meds    Arvid Reach is a 53year old female new to our office today for initiation of medical weight loss program.  Patient presents today with 7/10  Sleep hours and integrity:5-6  Hours per night    MEDICAL HISTORY  PMH reviewed:   Cardiac disorders: HTN, hx of LBBB  Depression/anxiety: +depression and anxiety  Glaucoma: negative  Kidney stones: negative  Eating disorder: negative  Migraines/seiz UE/LE  PSYCH: pleasant, cooperative, normal mood and affect, no si/hi    Lab Results   Component Value Date    GLU 96 08/06/2019    BUN 15 08/06/2019    BUNCREA 22.4 (H) 08/06/2019    CREATSERUM 0.67 08/06/2019    ANIONGAP 8 08/06/2019     09/11/201 (INTERNAL),         VITAMIN B12, VITAMIN D, 25-HYDROXY    (Z68.41) BMI 40.0-44.9, adult (HCC)    (R73.03) Prediabetes    (A17.8) Nonalcoholic fatty liver disease without nonalcoholic steatohepatitis (DEL CASTILLO)    Initial Weight Data and Goal Weight Loss:  [employer]

## 2020-02-21 NOTE — PATIENT INSTRUCTIONS
We are here to support you with weight loss, but please remember that you still need your primary care provider for your routine health maintenance.       PLAN:  Go to the lab for blood work   Follow up with me in 1 month  Schedule follow up appointments: T ** Daily INPUT> Look at nutrition section-- \"nutrients\" and it will break down your macros for the day (ie. Protein, carbs, fibers, sugars and fats). Try to stay within these numbers daily     2.  \"7 minute workout\" to help with exercise/a

## 2020-02-28 NOTE — TELEPHONE ENCOUNTER
LOV 1/20    LAST LAB 8/19    LAST RX    Next OV Visit date not found      PROTOCOL  Please advise. No protocol. If filled. Please close.    Thank You

## 2020-02-28 NOTE — TELEPHONE ENCOUNTER
Patient called and stated she used to use a \"solution\" for her psoriasis and has ran out, asking if a new one can be sent to pharmacy? States she has a new patch on her head.

## 2020-03-02 NOTE — TELEPHONE ENCOUNTER
Is she seeing dermatology for her psoriasis? I don't see rx from us for anything since 2015. Unless it was triamcinolone cream. I referred her to Dr. Nahid Potter in our building for psoriasis, if she has a consult scheduled, wait for that.

## 2020-03-13 NOTE — ED NOTES
Report called to [address] at [facility] emergency department. Post-Care Instructions: I reviewed with the patient in detail post-care instructions. Patient is to wear sunprotection, and avoid picking at any of the treated lesions. Pt may apply Vaseline to crusted or scabbing areas. Render Note In Bullet Format When Appropriate: No Duration Of Freeze Thaw-Cycle (Seconds): 0 Consent: The patient's consent was obtained including but not limited to risks of crusting, scabbing, blistering, scarring, darker or lighter pigmentary change, recurrence, incomplete removal and infection. Detail Level: Detailed

## 2020-03-27 ENCOUNTER — PATIENT MESSAGE (OUTPATIENT)
Dept: FAMILY MEDICINE CLINIC | Facility: CLINIC | Age: 45
End: 2020-03-27

## 2020-03-27 DIAGNOSIS — I10 ESSENTIAL HYPERTENSION: ICD-10-CM

## 2020-03-27 RX ORDER — IRBESARTAN AND HYDROCHLOROTHIAZIDE 300; 12.5 MG/1; MG/1
1 TABLET, FILM COATED ORAL DAILY
Qty: 90 TABLET | Refills: 0 | Status: SHIPPED | OUTPATIENT
Start: 2020-03-27 | End: 2020-07-08

## 2020-03-27 NOTE — TELEPHONE ENCOUNTER
From: Matilda Gregory  To: Annamaria Dawn MD  Sent: 3/27/2020 12:35 PM CDT  Subject: Other    [facility], Dr. Chadwick Martinez. 1. I went to [employer] on the 25th (2 days ago) I bought lunch there. I ate lunch in the car, and I started coughing some.  Now, 2 days

## 2020-04-13 ENCOUNTER — E-VISIT (OUTPATIENT)
Dept: FAMILY MEDICINE CLINIC | Facility: CLINIC | Age: 45
End: 2020-04-13

## 2020-04-13 DIAGNOSIS — R05.8 SPASMODIC COUGH: ICD-10-CM

## 2020-04-13 DIAGNOSIS — R06.02 SHORTNESS OF BREATH: ICD-10-CM

## 2020-04-13 DIAGNOSIS — R05.9 COUGH IN ADULT PATIENT: Primary | ICD-10-CM

## 2020-04-13 PROCEDURE — 99421 OL DIG E/M SVC 5-10 MIN: CPT | Performed by: NURSE PRACTITIONER

## 2020-04-14 RX ORDER — BENZONATATE 200 MG/1
200 CAPSULE ORAL 3 TIMES DAILY PRN
Qty: 30 CAPSULE | Refills: 0 | Status: SHIPPED | OUTPATIENT
Start: 2020-04-14 | End: 2020-05-13 | Stop reason: ALTCHOICE

## 2020-04-14 RX ORDER — AZITHROMYCIN 250 MG/1
TABLET, FILM COATED ORAL
Qty: 6 TABLET | Refills: 0 | Status: SHIPPED | OUTPATIENT
Start: 2020-04-14 | End: 2020-04-19

## 2020-04-14 RX ORDER — ALBUTEROL SULFATE 90 UG/1
2 AEROSOL, METERED RESPIRATORY (INHALATION) EVERY 6 HOURS PRN
Qty: 1 INHALER | Refills: 0 | Status: SHIPPED | OUTPATIENT
Start: 2020-04-14 | End: 2020-05-13 | Stop reason: ALTCHOICE

## 2020-04-14 NOTE — PATIENT INSTRUCTIONS
Acute Bronchitis  Your healthcare provider has told you that you have acute bronchitis. Bronchitis is infection or inflammation of the bronchial tubes (airways in the lungs). Normally, air moves easily in and out of the airways.  Bronchitis narrows the airw · Drink plenty of fluids, such as water, juice, or warm soup. Fluids loosen mucus so that you can cough it up. This helps you breathe more easily. Fluids also prevent dehydration. · Make sure you get plenty of rest.  · Do not smoke.  Do not allow anyone el · Remove the cap from the inhaler mouthpiece. Shake the inhaler several times. · If this is the first time you have used the inhaler, you will need to prime it. That means making sure it is ready to use.  Follow the product’s instructions for priming your · After using your inhaler, rinse your mouth with water. Swish, gargle, and spit out the water. Never swallow it. Inhaled corticosteroids can cause a fungal infection called thrush in your mouth and throat.   · If you use more than one inhaler, make sure yo

## 2020-04-14 NOTE — PROGRESS NOTES
Ruby Lam is a 53year old female. HPI:   See answers to questions above.      Current Outpatient Medications   Medication Sig Dispense Refill   • azithromycin 250 MG Oral Tab Take 2 tablets (500 mg total) by mouth daily for 1 day, THEN 1 tablet (2 hypertension     also hx essential HTN, benign   • Unspecified pruritic disorder    • Vertigo       Past Surgical History:   Procedure Laterality Date   • ANGIOGRAM  2015   • CHOLECYSTECTOMY  2005    laparoscopic, Dr. Amy Durham   • HYSTERECTOMY     • [place] Ced for specific patient instructions        Duration of  the service: 5 minutes

## 2020-04-16 DIAGNOSIS — I10 ESSENTIAL HYPERTENSION: ICD-10-CM

## 2020-04-17 ENCOUNTER — TELEMEDICINE (OUTPATIENT)
Dept: FAMILY MEDICINE CLINIC | Facility: CLINIC | Age: 45
End: 2020-04-17

## 2020-04-17 DIAGNOSIS — J20.9 ACUTE BRONCHITIS, UNSPECIFIED ORGANISM: ICD-10-CM

## 2020-04-17 DIAGNOSIS — R06.00 DYSPNEA, UNSPECIFIED TYPE: Primary | ICD-10-CM

## 2020-04-17 PROCEDURE — 99213 OFFICE O/P EST LOW 20 MIN: CPT | Performed by: FAMILY MEDICINE

## 2020-04-17 RX ORDER — IRBESARTAN AND HYDROCHLOROTHIAZIDE 300; 12.5 MG/1; MG/1
1 TABLET, FILM COATED ORAL DAILY
Qty: 90 TABLET | Refills: 0 | OUTPATIENT
Start: 2020-04-17

## 2020-04-17 NOTE — TELEPHONE ENCOUNTER
LOV 1/20    LAST LAB 8/19    LAST RX   Irbesartan-hydroCHLOROthiazide 300-12.5 MG Oral Tab 90 tablet 0 3/27/2020         Next OV    PROTOCOL  Hypertension Medications Protocol Passed    Rx denied refill too soon.

## 2020-04-17 NOTE — PROGRESS NOTES
Yemi Gregory IS A 53year old female evaluated via video visit FOR Patient presents with:  Shortness Of Breath  Ear Problem: feels like she's under water   due to current national emergency with SARS-CoV-2 pandemic.      History of present illness:   2-w headache x 2d. Felt tired but not severe fatigue while sick. Appetite ok. No nausea. No diarrhea. Inhaler used x 2 d. Is on 4th day Rachel Ortiz today last night could sleep in bed. Fees better today than 2 d ago. Not struggling as much to breathe.  Still strug growing fibroid that proved benign   • ROBOT-ASSISTED LAPAROSCOPIC HYSTERECTOMY Bilateral 8/13/2019    Performed by Padma Zelaya MD at [facility] MAIN OR       Current medications:     Current Outpatient Medications   Medication Sig Dispense Refill   • olive history:       Social History    Socioeconomic History      Marital status:       Spouse name: Not on file      Number of children: 0      Years of education: Not on file      Highest education level: Not on file    Occupational History      [employer] Not on file      Review of systems:     See HPI    Exam:     Pt appears to be in no distress. Oriented to person, place, time and situation. Appropriate speech and mentation. Test results reviewed: e-visit.         Assessment & Plan:   Matt Turk was

## 2020-04-17 NOTE — PATIENT INSTRUCTIONS
Recheck Monday for status check, call my cell [phone] if worse over weekend. Otherwise recheck Monday  Inhaler, Tylenol can help if fever or body aches develop. Upright position can help breathing.

## 2020-04-18 ENCOUNTER — TELEPHONE (OUTPATIENT)
Dept: FAMILY MEDICINE CLINIC | Facility: CLINIC | Age: 45
End: 2020-04-18

## 2020-04-18 DIAGNOSIS — R06.00 DYSPNEA ON EXERTION: ICD-10-CM

## 2020-04-18 DIAGNOSIS — R05.8 PRODUCTIVE COUGH: Primary | ICD-10-CM

## 2020-04-19 RX ORDER — CEFUROXIME AXETIL 500 MG/1
500 TABLET ORAL 2 TIMES DAILY
Qty: 20 TABLET | Refills: 0 | Status: SHIPPED | OUTPATIENT
Start: 2020-04-19 | End: 2020-05-13 | Stop reason: ALTCHOICE

## 2020-04-19 NOTE — TELEPHONE ENCOUNTER
4/19/20 update Pt started doing steam inhalation , and it helped her cough up more phlegm.  SHe requested an antibiotic for pneumonia (just finished azithromycin), will send cefuroxime 500 mg bid, warned about potential vaginal yeast infection and told to t

## 2020-04-19 NOTE — TELEPHONE ENCOUNTER
Virtual Telephone Check-In    Pt notified me she is not feeling any better on 4 d so far of azithromycin, still dyspnea, hard to get a deep breath, feels like there is an obstruction to trying to get full breath.  Had to sleep sitting up last night and didn

## 2020-04-20 ENCOUNTER — APPOINTMENT (OUTPATIENT)
Dept: GENERAL RADIOLOGY | Facility: HOSPITAL | Age: 45
End: 2020-04-20
Attending: EMERGENCY MEDICINE
Payer: COMMERCIAL

## 2020-04-20 ENCOUNTER — TELEMEDICINE (OUTPATIENT)
Dept: FAMILY MEDICINE CLINIC | Facility: CLINIC | Age: 45
End: 2020-04-20

## 2020-04-20 ENCOUNTER — HOSPITAL ENCOUNTER (EMERGENCY)
Facility: HOSPITAL | Age: 45
Discharge: HOME | End: 2020-04-20
Attending: EMERGENCY MEDICINE
Payer: COMMERCIAL

## 2020-04-20 VITALS
SYSTOLIC BLOOD PRESSURE: 129 MMHG | TEMPERATURE: 100 F | HEART RATE: 107 BPM | WEIGHT: 238 LBS | BODY MASS INDEX: 44.93 KG/M2 | RESPIRATION RATE: 18 BRPM | HEIGHT: 61 IN | OXYGEN SATURATION: 97 % | DIASTOLIC BLOOD PRESSURE: 98 MMHG

## 2020-04-20 DIAGNOSIS — R05.9 COUGH: ICD-10-CM

## 2020-04-20 DIAGNOSIS — R06.00 DYSPNEA, UNSPECIFIED TYPE: Primary | ICD-10-CM

## 2020-04-20 DIAGNOSIS — J06.9 VIRAL URI WITH COUGH: Primary | ICD-10-CM

## 2020-04-20 PROCEDURE — 84145 PROCALCITONIN (PCT): CPT | Performed by: HOSPITALIST

## 2020-04-20 PROCEDURE — 36415 COLL VENOUS BLD VENIPUNCTURE: CPT

## 2020-04-20 PROCEDURE — 84484 ASSAY OF TROPONIN QUANT: CPT | Performed by: EMERGENCY MEDICINE

## 2020-04-20 PROCEDURE — 99214 OFFICE O/P EST MOD 30 MIN: CPT | Performed by: FAMILY MEDICINE

## 2020-04-20 PROCEDURE — 93010 ELECTROCARDIOGRAM REPORT: CPT

## 2020-04-20 PROCEDURE — 86140 C-REACTIVE PROTEIN: CPT | Performed by: HOSPITALIST

## 2020-04-20 PROCEDURE — 99284 EMERGENCY DEPT VISIT MOD MDM: CPT

## 2020-04-20 PROCEDURE — 99283 EMERGENCY DEPT VISIT LOW MDM: CPT

## 2020-04-20 PROCEDURE — 80053 COMPREHEN METABOLIC PANEL: CPT | Performed by: EMERGENCY MEDICINE

## 2020-04-20 PROCEDURE — 93005 ELECTROCARDIOGRAM TRACING: CPT

## 2020-04-20 PROCEDURE — 85025 COMPLETE CBC W/AUTO DIFF WBC: CPT | Performed by: EMERGENCY MEDICINE

## 2020-04-20 PROCEDURE — 71045 X-RAY EXAM CHEST 1 VIEW: CPT | Performed by: EMERGENCY MEDICINE

## 2020-04-20 PROCEDURE — 83615 LACTATE (LD) (LDH) ENZYME: CPT | Performed by: EMERGENCY MEDICINE

## 2020-04-20 RX ORDER — SODIUM CHLORIDE 9 MG/ML
1000 INJECTION, SOLUTION INTRAVENOUS ONCE
Status: DISCONTINUED | OUTPATIENT
Start: 2020-04-20 | End: 2020-04-20

## 2020-04-20 NOTE — PATIENT INSTRUCTIONS
Please go to [facility] for evaluation. I expect you'll be sent home but need to know what your diagnosis is in order to know best treatment and expectation.

## 2020-04-20 NOTE — PROGRESS NOTES
Ezra Gregory IS A 53year old female evaluated via video visit FOR Patient presents with:  Bronchitis: f/u   due to current national emergency with SARS-CoV-2 pandemic.      History of present illness:   2-way audiovisual communication established via Ca she started steam it helped her cough more up and has been more hoarse. Antibiotic and steam seemed to help. 2x yesterday felt some phlegm in throat. No myalgias. Started cefuroxime x 2 doses asked when it would work. Thursday to Saturday lost taste.  Co Dispense Refill   • Cefuroxime Axetil 500 MG Oral Tab Take 1 tablet (500 mg total) by mouth 2 (two) times daily.  20 tablet 0   • Albuterol Sulfate  (90 Base) MCG/ACT Inhalation Aero Soln Inhale 2 puffs into the lungs every 6 (six) hours as needed fo Never Smoker      Smokeless tobacco: Never Used    Substance and Sexual Activity      Alcohol use: No      Drug use: No      Sexual activity: Not Currently        Partners: Male    Lifestyle      Physical activity:        Days per week: Not on file only slightly better, but still had to sleep upright. No fever but I am concerned about oxygenation, whether she has viral or bacterial appearing pneumonia, and if we need to monitor her more closely if she has Covid.      Has had 3 d of loss of taste which

## 2020-04-21 NOTE — ED PROVIDER NOTES
Patient Seen in: [facility] Emergency Department      History   Patient presents with:  Cough/URI    Stated Complaint: TRIPP, URI symptoms, on 2nd round of antibiotics      HPI    The patient is a 12-year-old female presents emergency room with a hist pruritic disorder    • Vertigo               Past Surgical History:   Procedure Laterality Date   • ANGIOGRAM  2015   • CHOLECYSTECTOMY  2005    laparoscopic, Dr. Annette Barron   • HYSTERECTOMY     • [facility]    Laparoscopy   • [address] [facility] tachycardic rate. Normal S1, S2 no S3, or S4. No murmur. ABDOMEN: There is no focal tenderness to palpation appreciated anywhere throughout the abdomen. There is no guarding, no rebound, no mass, and no organomegaly appreciated.  There is normoactive bowel easily in no apparent distress. Patient is refusing admission to the hospital aware of the risk of leaving at this time and wants to go home. Will discharge home [address] at this time.   21:53  Patient is refusing to sign AMA at this time, b Patient states she feels much better this week versus last week.           Disposition and Plan     Clinical Impression:  Viral URI with cough  (primary encounter diagnosis)    Disposition:  Discharge  4/20/2020  9:53 pm    Follow-up:  Alejandro Kruger MD

## 2020-04-21 NOTE — ED INITIAL ASSESSMENT (HPI)
Patient sick on and off since March 25. Last Monday worsening in symptoms. On second round of abx, second day taking them today. States having to sleep sitting up due to dyspnea.

## 2020-04-24 ENCOUNTER — TELEPHONE (OUTPATIENT)
Dept: FAMILY MEDICINE CLINIC | Facility: CLINIC | Age: 45
End: 2020-04-24

## 2020-04-27 ENCOUNTER — PATIENT MESSAGE (OUTPATIENT)
Dept: FAMILY MEDICINE CLINIC | Facility: CLINIC | Age: 45
End: 2020-04-27

## 2020-04-30 ENCOUNTER — TELEMEDICINE (OUTPATIENT)
Dept: FAMILY MEDICINE CLINIC | Facility: CLINIC | Age: 45
End: 2020-04-30

## 2020-04-30 DIAGNOSIS — J22 LOWER RESPIRATORY INFECTION: Primary | ICD-10-CM

## 2020-04-30 PROCEDURE — 99213 OFFICE O/P EST LOW 20 MIN: CPT | Performed by: FAMILY MEDICINE

## 2020-04-30 NOTE — PATIENT INSTRUCTIONS
Return to work approved for Monday. Allergy testing suggested for June. Notify us if recurrent cough or shortness of breath.

## 2020-04-30 NOTE — PROGRESS NOTES
Shamir Gregory IS A 53year old female evaluated via video visit FOR Patient presents with:  Cough: f/u patient didn't want to go everything all over again. due to current national emergency with SARS-CoV-2 pandemic.      History of present illness:   2- site unspecified    • External hemorrhoids without mention of complication    • Gastritis     mild   • BRITTNEY(259.5)    • Helicobacter pylori (H. pylori) 3/2010    H. pylori (+)   • High-density lipoprotein deficiency     \"low HDL\"   • Hypertension ANAPHYLAXIS, SWELLING    Comment:Angioedema  Shellfish-Derived P*    ANAPHYLAXIS, RASH  Olmesartan Medoxomi*    PAIN    Comment:Headaches daily  Enbrel                  RASH    Family history:       Family History   Problem Relation Age of Onset   • Diabet abused: Not on file        Forced sexual activity: Not on file    Other Topics      Concerns:         Service: Not Asked        Blood Transfusions: Not Asked        Caffeine Concern: Yes          1 cup coffee daily        Occupational Exposure:  No

## 2020-05-13 ENCOUNTER — TELEMEDICINE (OUTPATIENT)
Dept: FAMILY MEDICINE CLINIC | Facility: CLINIC | Age: 45
End: 2020-05-13

## 2020-05-13 DIAGNOSIS — Z20.822 SUSPECTED COVID-19 VIRUS INFECTION: ICD-10-CM

## 2020-05-13 DIAGNOSIS — J22 LOWER RESPIRATORY INFECTION (E.G., BRONCHITIS, PNEUMONIA, PNEUMONITIS, PULMONITIS): ICD-10-CM

## 2020-05-13 DIAGNOSIS — R06.02 SHORTNESS OF BREATH: Primary | ICD-10-CM

## 2020-05-13 PROCEDURE — 99213 OFFICE O/P EST LOW 20 MIN: CPT | Performed by: FAMILY MEDICINE

## 2020-05-13 NOTE — PATIENT INSTRUCTIONS
Will order CT chest noncontrast for lung evaluation  Ventilation perfusion scan to look for clots in lung. Ordered covid-19 antibody test, will be sent out to [facility] and may take awhile to get back but should get some answers.

## 2020-05-13 NOTE — PROGRESS NOTES
Matt Gregory IS A 53year old female evaluated via video visit FOR Patient presents with:  Shortness Of Breath: sore throat in the morning, cough when taking deepbreath. due to current national emergency with SARS-CoV-2 pandemic.      History of cristin have wheezing when she goes to sleep, had that previously but has not had any other breathing issues in past before recent illness, and had adverse reaction to inhalers, breathing got worse.    Past history:     Past Medical History:   Diagnosis Date   • Ac mouth daily.          Allergy:        Codeine                 RASH  Seafood                 ANAPHYLAXIS, SWELLING    Comment:Angioedema  Shellfish-Derived P*    ANAPHYLAXIS, RASH  Olmesartan Medoxomi*    PAIN    Comment:Headaches daily  Enbrel current or ex partner: Not on file        Emotionally abused: Not on file        Physically abused: Not on file        Forced sexual activity: Not on file    Other Topics      Concerns:         Service: Not Asked        Blood Transfusions: Not Aske test, will be sent out to [facility] and may take awhile to get back but should get some answers. pt discussed with radiology, nurse also in contact with radiology. They are avoiding VQ due to potential covid.  CT radiologist advised against IV contrast du

## 2020-06-12 ENCOUNTER — APPOINTMENT (OUTPATIENT)
Dept: CARDIOLOGY | Age: 45
End: 2020-06-12

## 2020-06-12 ENCOUNTER — OFFICE VISIT (OUTPATIENT)
Dept: CARDIOLOGY | Age: 45
End: 2020-06-12

## 2020-06-12 DIAGNOSIS — I44.7 LBBB (LEFT BUNDLE BRANCH BLOCK): ICD-10-CM

## 2020-06-12 DIAGNOSIS — I15.9 SECONDARY HYPERTENSION: Primary | ICD-10-CM

## 2020-06-12 PROCEDURE — 99214 OFFICE O/P EST MOD 30 MIN: CPT | Performed by: INTERNAL MEDICINE

## 2020-06-12 SDOH — HEALTH STABILITY: PHYSICAL HEALTH: ON AVERAGE, HOW MANY DAYS PER WEEK DO YOU ENGAGE IN MODERATE TO STRENUOUS EXERCISE (LIKE A BRISK WALK)?: 0 DAYS

## 2020-06-12 SDOH — HEALTH STABILITY: MENTAL HEALTH: HOW OFTEN DO YOU HAVE A DRINK CONTAINING ALCOHOL?: NEVER

## 2020-06-12 SDOH — HEALTH STABILITY: PHYSICAL HEALTH: ON AVERAGE, HOW MANY MINUTES DO YOU ENGAGE IN EXERCISE AT THIS LEVEL?: 0 MIN

## 2020-06-24 ENCOUNTER — TELEPHONE (OUTPATIENT)
Dept: FAMILY MEDICINE CLINIC | Facility: CLINIC | Age: 45
End: 2020-06-24

## 2020-06-24 ENCOUNTER — TELEPHONE (OUTPATIENT)
Dept: CARDIOLOGY | Age: 45
End: 2020-06-24

## 2020-06-24 NOTE — TELEPHONE ENCOUNTER
Patient called she has several questions she wants to talk to dr Precious Montelongo regarding .     She has several testing that dr Precious Montelongo wants her to do as well as her cardiologist she would like to know what ones she really needs to do and what appointments she need

## 2020-06-24 NOTE — TELEPHONE ENCOUNTER
Covid was ordered 5/13 dont see any results. Left detailed message for patient on phone.  Since she thinks she has Covid especially with SOB needs to go to ER for eval.

## 2020-07-22 ENCOUNTER — TELEPHONE (OUTPATIENT)
Dept: FAMILY MEDICINE CLINIC | Facility: CLINIC | Age: 45
End: 2020-07-22

## 2020-07-22 NOTE — TELEPHONE ENCOUNTER
I doubt her ongoing symptoms with breathing are covid-related to any acute infection. Would she rather see derm about her rash? We could do a referral. I really don't know how well I can tell the cause of a rash especially if it's fading.     We could s

## 2020-07-22 NOTE — TELEPHONE ENCOUNTER
Patient stated she has butterfly shaped rash on her face that she is worried about being Lupas. She also stated that 1.5 months ago she had COVID. Patient is scheduled on Friday 7/24 for in office appointment.     Please Triage and advise is this shou

## 2020-07-22 NOTE — TELEPHONE ENCOUNTER
Called pt stating noted 3 days ago after working out for the first time in 1 year butterfly-like rash across face, burning sensation. Hx of psoriasis. Pt splashed face with cool water which provided relief.  Rash dissipated over last couple days, still noti

## 2020-07-23 NOTE — TELEPHONE ENCOUNTER
Called pt to inform per PCP  does not think ongoing symptoms with breathing are covid-related to any acute infection.  Inquired if preferred to see derm due to their expertise as we really don't know how well can tell the cause of a rash especially if it's

## 2020-07-24 ENCOUNTER — HOSPITAL ENCOUNTER (EMERGENCY)
Facility: HOSPITAL | Age: 45
Discharge: HOME | End: 2020-07-24
Attending: EMERGENCY MEDICINE
Payer: COMMERCIAL

## 2020-07-24 ENCOUNTER — APPOINTMENT (OUTPATIENT)
Dept: GENERAL RADIOLOGY | Facility: HOSPITAL | Age: 45
End: 2020-07-24
Payer: COMMERCIAL

## 2020-07-24 ENCOUNTER — APPOINTMENT (OUTPATIENT)
Dept: CT IMAGING | Facility: HOSPITAL | Age: 45
End: 2020-07-24
Attending: EMERGENCY MEDICINE
Payer: COMMERCIAL

## 2020-07-24 ENCOUNTER — OFFICE VISIT (OUTPATIENT)
Dept: FAMILY MEDICINE CLINIC | Facility: CLINIC | Age: 45
End: 2020-07-24

## 2020-07-24 VITALS
SYSTOLIC BLOOD PRESSURE: 155 MMHG | HEIGHT: 61 IN | HEART RATE: 95 BPM | DIASTOLIC BLOOD PRESSURE: 82 MMHG | OXYGEN SATURATION: 97 % | WEIGHT: 235 LBS | RESPIRATION RATE: 22 BRPM | TEMPERATURE: 98 F | BODY MASS INDEX: 44.37 KG/M2

## 2020-07-24 DIAGNOSIS — R21 FACIAL RASH: Primary | ICD-10-CM

## 2020-07-24 DIAGNOSIS — R06.00 DYSPNEA, UNSPECIFIED TYPE: ICD-10-CM

## 2020-07-24 DIAGNOSIS — R00.2 PALPITATIONS: ICD-10-CM

## 2020-07-24 DIAGNOSIS — R06.00 DYSPNEA, UNSPECIFIED TYPE: Primary | ICD-10-CM

## 2020-07-24 DIAGNOSIS — R23.2 FACIAL FLUSHING: ICD-10-CM

## 2020-07-24 DIAGNOSIS — R50.9 FEVER, UNSPECIFIED FEVER CAUSE: ICD-10-CM

## 2020-07-24 LAB
ALBUMIN SERPL-MCNC: 3.2 G/DL (ref 3.4–5)
ALBUMIN/GLOB SERPL: 0.7 {RATIO} (ref 1–2)
ALP LIVER SERPL-CCNC: 100 U/L (ref 39–100)
ALT SERPL-CCNC: 41 U/L (ref 13–56)
ANION GAP SERPL CALC-SCNC: 7 MMOL/L (ref 0–18)
APTT PPP: 35.5 SECONDS (ref 25.4–36.1)
AST SERPL-CCNC: 34 U/L (ref 15–37)
BASOPHILS # BLD AUTO: 0.06 X10(3) UL (ref 0–0.2)
BASOPHILS NFR BLD AUTO: 0.6 %
BILIRUB SERPL-MCNC: 0.4 MG/DL (ref 0.1–2)
BUN BLD-MCNC: 15 MG/DL (ref 7–18)
BUN/CREAT SERPL: 18.5 (ref 10–20)
CALCIUM BLD-MCNC: 9.2 MG/DL (ref 8.5–10.1)
CHLORIDE SERPL-SCNC: 105 MMOL/L (ref 98–112)
CO2 SERPL-SCNC: 25 MMOL/L (ref 21–32)
CREAT BLD-MCNC: 0.81 MG/DL (ref 0.55–1.02)
D-DIMER: 0.6 UG/ML FEU (ref ?–0.5)
DEPRECATED RDW RBC AUTO: 42.6 FL (ref 35.1–46.3)
EOSINOPHIL # BLD AUTO: 0.07 X10(3) UL (ref 0–0.7)
EOSINOPHIL NFR BLD AUTO: 0.7 %
ERYTHROCYTE [DISTWIDTH] IN BLOOD BY AUTOMATED COUNT: 14.2 % (ref 11–15)
GLOBULIN PLAS-MCNC: 4.6 G/DL (ref 2.8–4.4)
GLUCOSE BLD-MCNC: 174 MG/DL (ref 70–99)
HCT VFR BLD AUTO: 41.9 % (ref 35–48)
HGB BLD-MCNC: 13.7 G/DL (ref 12–16)
IMM GRANULOCYTES # BLD AUTO: 0.05 X10(3) UL (ref 0–1)
IMM GRANULOCYTES NFR BLD: 0.5 %
INR BLD: 0.99 (ref 0.89–1.11)
LACTATE SERPL-SCNC: 1.8 MMOL/L (ref 0.4–2)
LYMPHOCYTES # BLD AUTO: 1.7 X10(3) UL (ref 1–4)
LYMPHOCYTES NFR BLD AUTO: 16 %
M PROTEIN MFR SERPL ELPH: 7.8 G/DL (ref 6.4–8.2)
MCH RBC QN AUTO: 27.1 PG (ref 26–34)
MCHC RBC AUTO-ENTMCNC: 32.7 G/DL (ref 31–37)
MCV RBC AUTO: 83 FL (ref 80–100)
MONOCYTES # BLD AUTO: 0.64 X10(3) UL (ref 0.1–1)
MONOCYTES NFR BLD AUTO: 6 %
NEUTROPHILS # BLD AUTO: 8.12 X10 (3) UL (ref 1.5–7.7)
NEUTROPHILS # BLD AUTO: 8.12 X10(3) UL (ref 1.5–7.7)
NEUTROPHILS NFR BLD AUTO: 76.2 %
OSMOLALITY SERPL CALC.SUM OF ELEC: 289 MOSM/KG (ref 275–295)
PLATELET # BLD AUTO: 344 10(3)UL (ref 150–450)
POTASSIUM SERPL-SCNC: 3.9 MMOL/L (ref 3.5–5.1)
PSA SERPL DL<=0.01 NG/ML-MCNC: 13.4 SECONDS (ref 12.4–14.6)
RBC # BLD AUTO: 5.05 X10(6)UL (ref 3.8–5.3)
SARS-COV-2 IGG SERPLBLD QL IA.RAPID: NEGATIVE
SARS-COV-2 RNA RESP QL NAA+PROBE: NOT DETECTED
SED RATE-ML: 31 MM/HR (ref 0–25)
SODIUM SERPL-SCNC: 137 MMOL/L (ref 136–145)
TROPONIN I SERPL-MCNC: <0.045 NG/ML (ref ?–0.04)
WBC # BLD AUTO: 10.6 X10(3) UL (ref 4–11)

## 2020-07-24 PROCEDURE — 71045 X-RAY EXAM CHEST 1 VIEW: CPT | Performed by: EMERGENCY MEDICINE

## 2020-07-24 PROCEDURE — 84484 ASSAY OF TROPONIN QUANT: CPT | Performed by: EMERGENCY MEDICINE

## 2020-07-24 PROCEDURE — 87040 BLOOD CULTURE FOR BACTERIA: CPT | Performed by: EMERGENCY MEDICINE

## 2020-07-24 PROCEDURE — 93005 ELECTROCARDIOGRAM TRACING: CPT

## 2020-07-24 PROCEDURE — 85730 THROMBOPLASTIN TIME PARTIAL: CPT | Performed by: EMERGENCY MEDICINE

## 2020-07-24 PROCEDURE — 93010 ELECTROCARDIOGRAM REPORT: CPT

## 2020-07-24 PROCEDURE — 86769 SARS-COV-2 COVID-19 ANTIBODY: CPT | Performed by: EMERGENCY MEDICINE

## 2020-07-24 PROCEDURE — 83605 ASSAY OF LACTIC ACID: CPT | Performed by: EMERGENCY MEDICINE

## 2020-07-24 PROCEDURE — 99285 EMERGENCY DEPT VISIT HI MDM: CPT

## 2020-07-24 PROCEDURE — 71275 CT ANGIOGRAPHY CHEST: CPT | Performed by: EMERGENCY MEDICINE

## 2020-07-24 PROCEDURE — 85379 FIBRIN DEGRADATION QUANT: CPT | Performed by: EMERGENCY MEDICINE

## 2020-07-24 PROCEDURE — 80053 COMPREHEN METABOLIC PANEL: CPT | Performed by: EMERGENCY MEDICINE

## 2020-07-24 PROCEDURE — 99284 EMERGENCY DEPT VISIT MOD MDM: CPT

## 2020-07-24 PROCEDURE — 99213 OFFICE O/P EST LOW 20 MIN: CPT | Performed by: FAMILY MEDICINE

## 2020-07-24 PROCEDURE — 85610 PROTHROMBIN TIME: CPT | Performed by: EMERGENCY MEDICINE

## 2020-07-24 PROCEDURE — 36415 COLL VENOUS BLD VENIPUNCTURE: CPT

## 2020-07-24 PROCEDURE — 85025 COMPLETE CBC W/AUTO DIFF WBC: CPT | Performed by: EMERGENCY MEDICINE

## 2020-07-24 PROCEDURE — 85652 RBC SED RATE AUTOMATED: CPT | Performed by: EMERGENCY MEDICINE

## 2020-07-24 RX ORDER — MULTIVITAMIN WITH FOLIC ACID 400 MCG
1 TABLET ORAL DAILY
COMMUNITY
End: 2021-03-25 | Stop reason: ALTCHOICE

## 2020-07-24 RX ORDER — IRBESARTAN AND HYDROCHLOROTHIAZIDE 300; 12.5 MG/1; MG/1
1 TABLET, FILM COATED ORAL DAILY
Qty: 30 TABLET | Refills: 0 | Status: SHIPPED | OUTPATIENT
Start: 2020-07-24 | End: 2020-07-24

## 2020-07-24 RX ORDER — SODIUM CHLORIDE 9 MG/ML
1000 INJECTION, SOLUTION INTRAVENOUS ONCE
Status: COMPLETED | OUTPATIENT
Start: 2020-07-24 | End: 2020-07-24

## 2020-07-24 NOTE — PROGRESS NOTES
Lorena Gregory IS A 50year old female evaluated via telehealth visit FOR No chief complaint on file. due to current national emergency with SARS-CoV-2 pandemic. History of present illness:   2-way communication established.  Patient agreed to this m has a headache today & yesterday, not identical to migraine but about as severe. PMH: covid-like illness in April to May. Rapid test negative, CXR normal May 13. Has not had antibody test done.      rheum ROS: No joint pains, has rash on cheeks, no ramone LAPAROSCOPIC HYSTERECTOMY Bilateral 8/13/2019    Performed by Fernando Mckeon MD at [facility] MAIN OR       Current medications:     Current Outpatient Medications   Medication Sig Dispense Refill   • Multiple Vitamins-Minerals (TAB-A-JORGE) Oral Tab Take 1 tab Minutes per session: Not on file      Stress: Not on file    Relationships      Social connections:        Talks on phone: Not on file        Gets together: Not on file        Attends Muslim service: Not on file        Active member of club or organi Diagnoses and all orders for this visit:    Facial rash  -     JOHN, DIRECT, REFLEX TO 9 ENAS; Future  -     SED RATE, WESTERGREN (AUTOMATED); Future    Fever, unspecified fever cause  -     SED RATE, WESTERGREN (AUTOMATED);  Future  -     CBC WITH DIFFERE

## 2020-07-24 NOTE — ED INITIAL ASSESSMENT (HPI)
Pt to ED with c/o TRIPP, palpitations, feels like she is shaking after she takes BP meds at night. On Monday pt was started on irbesartan/HCTZ.  BP at home 187/96/, felt dizzy

## 2020-07-24 NOTE — PATIENT INSTRUCTIONS
Referred to ER for c/o fever, palpitations, chest pain, facial rash, ongoing dyspnea since 2-3 months ago and no past asthma hx.  DDx of facial rash and flushing includes Lupus, hyperthyroid (which could explain some other symptoms/findings), medication err

## 2020-07-24 NOTE — ED PROVIDER NOTES
Patient Seen in: [facility] Emergency Department      History   Patient presents with:  Dyspnea DONALD SOB  Cellulitis    Stated Complaint: rash, fever, donald    HPI    Patient is a pleasant 64-year-old female, presenting for evaluation of palpitations, CHOLECYSTECTOMY  2005    laparoscopic, Dr. iRch Sánchez   • HYSTERECTOMY     • [facility]    Laparoscopy   • LAPAROSCOPY, SURG, W/VAGINAL HYSTERECTOMY, UTERUS >250GMS; W/REMOVE TUBE(S) &/OR OVARY(S)  08/13/2019    saranya Davison, for la nerves are grossly intact. There is no gross motor or sensory deficits identified.       ED Course     Labs Reviewed   COMP METABOLIC PANEL (14) - Abnormal; Notable for the following components:       Result Value    Glucose 174 (*)     Albumin 3.2 (*) 7:53 PM.  INDICATIONS:  rash, fever, donald  TECHNIQUE:  IV contrast-enhanced multislice CT angiography is performed through the pulmonary arterial anatomy. 3D volume renderings are generated. Dose reduction techniques were used.  Dose information is transmit Maricel Terrell MD on 7/24/2020 at 7:34 PM       Xr Chest Ap Portable  (cpt=71045)    Result Date: 7/24/2020  PROCEDURE:  XR CHEST AP PORTABLE  (CPT=71045)  TECHNIQUE:  AP chest radiograph was obtained.   COMPARISON:  [facility] , XR, XR CHEST AP PORTABLE  (CPT=71045) [address]  [phone]    Schedule an appointment as soon as possible for a visit in 1 week            Medications Prescribed:  Current Discharge Medication List

## 2020-07-25 LAB
ATRIAL RATE: 108 BPM
P AXIS: 37 DEGREES
P-R INTERVAL: 136 MS
Q-T INTERVAL: 350 MS
QRS DURATION: 80 MS
QTC CALCULATION (BEZET): 469 MS
R AXIS: 69 DEGREES
T AXIS: 59 DEGREES
VENTRICULAR RATE: 108 BPM

## 2020-07-25 NOTE — ED NOTES
MD at bedside. Pt updated on poc. Funk Maryse went to inpt pharmacy to retrieve pt's meds from home.

## 2020-07-27 ENCOUNTER — TELEPHONE (OUTPATIENT)
Dept: FAMILY MEDICINE CLINIC | Facility: CLINIC | Age: 45
End: 2020-07-27

## 2020-07-27 PROBLEM — R21 FACIAL RASH: Status: ACTIVE | Noted: 2020-07-27

## 2020-07-27 NOTE — TELEPHONE ENCOUNTER
VMML for patient to return call with symptom update. Also advised to call [facility] to schedule OP Lab appt to have labs done ordered by Dr. Ben Juarez.

## 2020-07-27 NOTE — TELEPHONE ENCOUNTER
Patient returned call. States she is feeling much better. Rash is almost totally gone. Spoke to Pharmacy to find out the Brand name of the original B/P medicine she received before the [place] brand.   They were able to exchange part of her rx with the o

## 2020-07-27 NOTE — TELEPHONE ENCOUNTER
Pt was in ER Friday. Please ask how she is doing with her rash, blood pressure, and breathing since then. ER did not do all the labs I ordered, so she could schedule labs [phone].

## 2020-09-10 ENCOUNTER — HOSPITAL ENCOUNTER (OUTPATIENT)
Dept: CV DIAGNOSTICS | Facility: HOSPITAL | Age: 45
Discharge: HOME | End: 2020-09-10
Attending: INTERNAL MEDICINE
Payer: COMMERCIAL

## 2020-09-10 ENCOUNTER — HOSPITAL ENCOUNTER (OUTPATIENT)
Dept: LAB | Facility: HOSPITAL | Age: 45
Discharge: HOME | End: 2020-09-10
Attending: FAMILY MEDICINE
Payer: COMMERCIAL

## 2020-09-10 DIAGNOSIS — I15.9 SECONDARY HYPERTENSION: ICD-10-CM

## 2020-09-10 DIAGNOSIS — R00.2 PALPITATIONS: ICD-10-CM

## 2020-09-10 DIAGNOSIS — R21 FACIAL RASH: ICD-10-CM

## 2020-09-10 DIAGNOSIS — I44.7 BUNDLE BRANCH BLOCK, LEFT: ICD-10-CM

## 2020-09-10 LAB
LV EF: NORMAL %
TSI SER-ACNC: 3.17 MIU/ML (ref 0.36–3.74)

## 2020-09-10 PROCEDURE — 84443 ASSAY THYROID STIM HORMONE: CPT

## 2020-09-10 PROCEDURE — 93306 TTE W/DOPPLER COMPLETE: CPT | Performed by: INTERNAL MEDICINE

## 2020-09-10 PROCEDURE — 36415 COLL VENOUS BLD VENIPUNCTURE: CPT

## 2020-09-10 PROCEDURE — 86038 ANTINUCLEAR ANTIBODIES: CPT

## 2020-09-11 LAB — ANA SCREEN: NEGATIVE

## 2020-09-23 ENCOUNTER — CLINICAL ABSTRACT (OUTPATIENT)
Dept: CARDIOLOGY | Age: 45
End: 2020-09-23

## 2020-10-21 DIAGNOSIS — I10 ESSENTIAL HYPERTENSION: ICD-10-CM

## 2020-10-22 NOTE — TELEPHONE ENCOUNTER
LOV 10/10/2019 (hypertension)    LAST LAB 8/6/2019    LAST RX   Irbesartan-hydroCHLOROthiazide 300-12.5 MG Oral Tab 90 tablet 0 7/8/2020         Next OV No future appointments.       PROTOCOL   Hypertension Medications Protocol Qspboe06/21/2020 04:41 PM   CMP or BMP in past 12 months Protocol Details    Last serum creatinine< 2.0     Appointment in past 6 or next 3 month

## 2020-10-26 DIAGNOSIS — I10 ESSENTIAL HYPERTENSION: ICD-10-CM

## 2020-10-26 RX ORDER — IRBESARTAN AND HYDROCHLOROTHIAZIDE 300; 12.5 MG/1; MG/1
1 TABLET, FILM COATED ORAL DAILY
Qty: 60 TABLET | Refills: 0 | Status: SHIPPED | OUTPATIENT
Start: 2020-10-26 | End: 2020-12-18

## 2020-10-26 RX ORDER — IRBESARTAN AND HYDROCHLOROTHIAZIDE 300; 12.5 MG/1; MG/1
1 TABLET, FILM COATED ORAL DAILY
Qty: 90 TABLET | Refills: 0 | OUTPATIENT
Start: 2020-10-26

## 2020-10-26 NOTE — TELEPHONE ENCOUNTER
LOV     LAST LAB 7/24/2020    LAST RX   Irbesartan-hydroCHLOROthiazide 300-12.5 MG Oral Tab 90 tablet 0 7/8/2020         Next OV   Future Appointments   Date Time Provider Fe Patel   12/4/2020  2:00 PM Cesario Babinski, MD [facility]

## 2020-10-26 NOTE — TELEPHONE ENCOUNTER
Patient stated she is taking her last pill today of Irbesartan-hydroChlorothiazide  She is requesting it be sent to "[facility]" on [address]. Patient is scheduled for her physical on 12/4. She stated she had labs done in September.   Please advise is

## 2020-10-28 RX ORDER — IRBESARTAN AND HYDROCHLOROTHIAZIDE 300; 12.5 MG/1; MG/1
1 TABLET, FILM COATED ORAL DAILY
Qty: 90 TABLET | Refills: 0 | OUTPATIENT
Start: 2020-10-28

## 2020-11-22 DIAGNOSIS — I10 ESSENTIAL HYPERTENSION: ICD-10-CM

## 2020-11-24 RX ORDER — IRBESARTAN AND HYDROCHLOROTHIAZIDE 300; 12.5 MG/1; MG/1
1 TABLET, FILM COATED ORAL DAILY
Qty: 90 TABLET | Refills: 0 | OUTPATIENT
Start: 2020-11-24

## 2020-11-24 NOTE — TELEPHONE ENCOUNTER
LOV 7/24/2020    LAST LAB 7/24/2020    LAST RX   IRBESARTAN-HYDROCHLOROTHIAZIDE 300-12.5 MG Oral Tab 60 tablet 0 10/26/2020         Next OV   Future Appointments   Date Time Provider Fe Patel   12/4/2020  2:00 PM Maria Elena Humphries MD [facility]

## 2020-12-18 ENCOUNTER — OFFICE VISIT (OUTPATIENT)
Dept: FAMILY MEDICINE CLINIC | Facility: CLINIC | Age: 45
End: 2020-12-18

## 2020-12-18 VITALS
WEIGHT: 236 LBS | HEIGHT: 61 IN | HEART RATE: 104 BPM | BODY MASS INDEX: 44.56 KG/M2 | DIASTOLIC BLOOD PRESSURE: 82 MMHG | RESPIRATION RATE: 16 BRPM | TEMPERATURE: 98 F | SYSTOLIC BLOOD PRESSURE: 142 MMHG | OXYGEN SATURATION: 98 %

## 2020-12-18 DIAGNOSIS — Z13.1 SCREENING FOR DIABETES MELLITUS: ICD-10-CM

## 2020-12-18 DIAGNOSIS — I10 ESSENTIAL HYPERTENSION: ICD-10-CM

## 2020-12-18 DIAGNOSIS — Z13.220 SCREENING, LIPID: ICD-10-CM

## 2020-12-18 DIAGNOSIS — Z00.00 WELL ADULT EXAM: Primary | ICD-10-CM

## 2020-12-18 DIAGNOSIS — L53.9 FACIAL ERYTHEMA: ICD-10-CM

## 2020-12-18 DIAGNOSIS — M62.830 SPASM OF THORACIC BACK MUSCLE: ICD-10-CM

## 2020-12-18 DIAGNOSIS — Z12.31 ENCOUNTER FOR SCREENING MAMMOGRAM FOR BREAST CANCER: ICD-10-CM

## 2020-12-18 DIAGNOSIS — Z13.0 SCREENING FOR DEFICIENCY ANEMIA: ICD-10-CM

## 2020-12-18 DIAGNOSIS — H69.83 DYSFUNCTION OF BOTH EUSTACHIAN TUBES: ICD-10-CM

## 2020-12-18 DIAGNOSIS — L40.9 PSORIASIS: ICD-10-CM

## 2020-12-18 PROCEDURE — 99396 PREV VISIT EST AGE 40-64: CPT | Performed by: FAMILY MEDICINE

## 2020-12-18 PROCEDURE — 3079F DIAST BP 80-89 MM HG: CPT | Performed by: FAMILY MEDICINE

## 2020-12-18 PROCEDURE — 99214 OFFICE O/P EST MOD 30 MIN: CPT | Performed by: FAMILY MEDICINE

## 2020-12-18 PROCEDURE — 3077F SYST BP >= 140 MM HG: CPT | Performed by: FAMILY MEDICINE

## 2020-12-18 PROCEDURE — 3008F BODY MASS INDEX DOCD: CPT | Performed by: FAMILY MEDICINE

## 2020-12-18 RX ORDER — RIBOFLAVIN (VITAMIN B2) 100 MG
100 TABLET ORAL DAILY
COMMUNITY
End: 2021-02-19 | Stop reason: ALTCHOICE

## 2020-12-18 RX ORDER — IRBESARTAN AND HYDROCHLOROTHIAZIDE 300; 12.5 MG/1; MG/1
1 TABLET, FILM COATED ORAL DAILY
Qty: 60 TABLET | Refills: 0 | Status: SHIPPED | OUTPATIENT
Start: 2020-12-18 | End: 2020-12-18

## 2020-12-18 RX ORDER — IRBESARTAN AND HYDROCHLOROTHIAZIDE 300; 12.5 MG/1; MG/1
1 TABLET, FILM COATED ORAL DAILY
Qty: 90 TABLET | Refills: 0 | OUTPATIENT
Start: 2020-12-18

## 2020-12-18 RX ORDER — IRBESARTAN AND HYDROCHLOROTHIAZIDE 300; 12.5 MG/1; MG/1
1 TABLET, FILM COATED ORAL DAILY
Qty: 90 TABLET | Refills: 0 | Status: SHIPPED | OUTPATIENT
Start: 2020-12-18 | End: 2021-03-24

## 2020-12-18 NOTE — PATIENT INSTRUCTIONS
Referrals as noted. Continue your efforts at weight loss! Pneumovax recommended for preventing bacterial pneumonia. Due to psoriasis and possible immunosuppressive meds for it plus your lung problems this year it would be good.     Tdap (tetanus dipht directives: Consider obtaining living will or health care power of , information available at http://www. idph.state. [place]/public/books/advin.htm.        Prevention Guidelines, Women Ages 36 to 52  Screening tests and vaccines are an important part o cancer Women age 39 years and older at average risk Multiple tests are available and are used at different times.  Possible tests include:  · Flexible sigmoidoscopy every 5 years, or  · Colonoscopy every 10 years, or  · CT colonography (virtual colonoscopy) Chickenpox (varicella) All women in this age group who have no record of this infection or vaccine 2 doses; the second dose should be given at least 4 weeks after the first dose   Hepatitis A Women at increased risk for infection–talk with your healthcar this age group Every exam   1 American Diabetes Association  2 American College of Obstetricians and Gynecologists   [address]  [address] [facility]  Rosalva last reviewed this educational content on 11/1/2017  © 8809-6750 T

## 2020-12-18 NOTE — PROGRESS NOTES
Colby Herbert is a 50year old female here for Patient presents with: Well Adult: Denied flu shot. HPI:   Patient complains of here for well exam.     Derm: Psoriasis getting worse.  Sees Dr Ayse richmond with ENbrel, shaky, hasn't tried other bi hemorrhoids without mention of complication    • Gastritis     mild   • FVRNURWB(020.5)    • Helicobacter pylori (H. pylori) 3/2010    H. pylori (+)   • High-density lipoprotein deficiency     \"low HDL\"   • Hypertension    • Other ill-defined conditions( Other         gallbladder, grandmother   • Diabetes Other         mother's side, aunts/uncles   • Other (kidney failure) Other         aunt and uncle, d/t DM       Social history:  Social History    Socioeconomic History      Marital status:  Exercise: No          walks 30 min 3-4 times a week        Bike Helmet: Not Asked        Seat Belt: Yes        Self-Exams: Yes    Social History Narrative      Not on file          REVIEW OF SYSTEMS:     Negative except as noted below:  Constitutional: H collected from a person who is consuming high dose of biotin (a.k.a., vitamin B7, vitamin H, coenzyme R) supplements resulting in serum concentrations >100 ng/mL.   Intake of the recommended daily allowance (RDA) for biotin (0.03 mg) has not been shown to t

## 2020-12-21 ENCOUNTER — PATIENT MESSAGE (OUTPATIENT)
Dept: FAMILY MEDICINE CLINIC | Facility: CLINIC | Age: 45
End: 2020-12-21

## 2020-12-21 NOTE — TELEPHONE ENCOUNTER
From: Eliu Gregory  To: Troy Herrera MD  Sent: 12/21/2020 10:34 AM CST  Subject: Prescription Question    Dr. Radha Osorio. I keep getting denied messages of refilled prescriptions, and I don't know why.  I just spoke with [facility] on [facility], and

## 2020-12-21 NOTE — TELEPHONE ENCOUNTER
Noted   Irbesartan-hydroCHLOROthiazide 300-12.5 MG Oral Tab 90 tablet 0 12/18/2020    Sig:   Take 1 tablet by mouth daily.

## 2020-12-23 ENCOUNTER — PATIENT MESSAGE (OUTPATIENT)
Dept: FAMILY MEDICINE CLINIC | Facility: CLINIC | Age: 45
End: 2020-12-23

## 2020-12-23 NOTE — TELEPHONE ENCOUNTER
Triage call transferred. Spoke with pt stating called [facility] indicating does not have rx as sent on 12/18/20    Irbesartan-hydroCHLOROthiazide 300-12.5 MG Oral Tab 90 tablet 0 12/18/2020     Sig - Route:  Take 1 tablet by mouth daily. - Oral

## 2020-12-23 NOTE — TELEPHONE ENCOUNTER
From: Jena Gregory  To: Jamel Sanders MD  Sent: 12/23/2020 8:11 AM CST  Subject: Prescription Question    Bonny Ignacio.  You mentioned in your last message that you sent a 90 day prescription for my blood pressure to my [facility] on [facility] [facility], and I

## 2021-01-11 ENCOUNTER — TELEPHONE (OUTPATIENT)
Dept: FAMILY MEDICINE CLINIC | Facility: CLINIC | Age: 46
End: 2021-01-11

## 2021-01-11 ENCOUNTER — TELEMEDICINE (OUTPATIENT)
Dept: FAMILY MEDICINE CLINIC | Facility: CLINIC | Age: 46
End: 2021-01-11

## 2021-01-11 VITALS — TEMPERATURE: 98 F

## 2021-01-11 DIAGNOSIS — R10.13 DYSPEPSIA: Primary | ICD-10-CM

## 2021-01-11 DIAGNOSIS — R10.12 LUQ ABDOMINAL PAIN: ICD-10-CM

## 2021-01-11 PROCEDURE — 99213 OFFICE O/P EST LOW 20 MIN: CPT | Performed by: FAMILY MEDICINE

## 2021-01-11 NOTE — TELEPHONE ENCOUNTER
I called patient in response to message received.     Onset of symptoms: 1/2 or 1/3 2021    Ingestion of known allergens: Denies any known ingestion or contact with her known allergens    Nausea/Diarrhea/Vomiting:exctly 1 week ago patient experienced vertig

## 2021-01-11 NOTE — TELEPHONE ENCOUNTER
Pt states that for over a week she has been experiencing abdominal pain after eating or drinking anything. Pt states she is doing better today so far, drank some water this morning and felt some pain and now has had her breakfast and nothing.

## 2021-01-11 NOTE — PATIENT INSTRUCTIONS
Continue low fat foods for another week or two. For gas, try some of these measures:    1. For possible milk or lactose intolerance, use Acidophilus milk or try Dairy-Ease or Lactaid tablets with any dairy products.  Yogurt or cheese may be better rosanna

## 2021-01-11 NOTE — PROGRESS NOTES
Julieta Gregory IS A 50year old female evaluated via telehealth visit FOR Patient presents with: Other: Upper mid  to left abdominal  pain. due to current national emergency with SARS-CoV-2 pandemic.      History of present illness:   2-way communicati bird carrizales yesterday.      Past history:     Past Medical History:   Diagnosis Date   • Acanthosis nigricans    • Anxiety state    • BV (bacterial vaginosis)    • Carpal tunnel syndrome    • Depression    • Depressive disorder, not elsewhere classified ANAPHYLAXIS, SWELLING    Comment:Angioedema  Shellfish-Derived P*    ANAPHYLAXIS, RASH  Olmesartan Medoxomi*    PAIN    Comment:Headaches daily, rash  Enbrel                  RASH    Family history:       Family History   Problem Relation Age of Onset   • abused: Not on file        Forced sexual activity: Not on file    Other Topics      Concerns:         Service: Not Asked        Blood Transfusions: Not Asked        Caffeine Concern: Yes          1 cup coffee daily        Occupational Exposure:  No split pea etc.) and cauliflower, cabbage, brussels sprouts & broccoli. If onions, peppers or cucumbers affect you, try eliminating them. Also avoid carbonated drinks.     3. To relieve gas in stomach: can try simethicone containing products (Gas-X, Mylicon)

## 2021-01-16 ENCOUNTER — TELEPHONE (OUTPATIENT)
Dept: FAMILY MEDICINE CLINIC | Facility: CLINIC | Age: 46
End: 2021-01-16

## 2021-01-16 NOTE — TELEPHONE ENCOUNTER
Paged by pt stating that she has been having upper abdominal pain for the past couple weeks. Seen by PCP and started on omeprazole 20mg otc, which she has been taking for the past 5d. Pain has improved somewhat but not significantly.  Has been trying to [place]

## 2021-01-19 ENCOUNTER — TELEPHONE (OUTPATIENT)
Dept: FAMILY MEDICINE CLINIC | Facility: CLINIC | Age: 46
End: 2021-01-19

## 2021-01-19 NOTE — TELEPHONE ENCOUNTER
Pt left a vm asking to speak to the nurse to f/u on a call she had with the \"On call provider\" in regard to the Upper Abdomen pain she was having. Wants to update her condition & find out what is the next step.

## 2021-01-19 NOTE — TELEPHONE ENCOUNTER
Detailed VMML for patient requesting call back or Ebook Gluehart message with condition update. Reviewed instructions given by on call doctor on 1/16/21. Ayaz Jc

## 2021-01-21 ENCOUNTER — PATIENT MESSAGE (OUTPATIENT)
Dept: FAMILY MEDICINE CLINIC | Facility: CLINIC | Age: 46
End: 2021-01-21

## 2021-01-22 NOTE — TELEPHONE ENCOUNTER
Mckenzie Meza RN 1/21/2021 4:02 PM CST      ----- Message -----  From: Nancy Marin  Sent: 1/21/2021 2:31 PM CST  To: [facility] Clinical Staff  Subject: Visit Follow-up Question     Hi, Dr. Andreea Blakely.  My abdominal pain has gotten better; however, I do have

## 2021-01-22 NOTE — TELEPHONE ENCOUNTER
This is too many questions to answer outside a visit. I do think a GI referral would be helpful so they can answer the treatment, medication and diagnostic test questions.  Famotidine can be taken if you have breakthrough symptoms on omeprazole but the omep

## 2021-01-25 ENCOUNTER — TELEPHONE (OUTPATIENT)
Dept: FAMILY MEDICINE CLINIC | Facility: CLINIC | Age: 46
End: 2021-01-25

## 2021-01-25 NOTE — TELEPHONE ENCOUNTER
Patient called after sending the [facility] message. I read her what Dr. Venancio Tidwell said. She stated she would like the referral for a GI Dr.    Was not clear if Dr. Venancio Tidwell needs to see her in the office first as she just had a video visit on 1/11.

## 2021-01-26 NOTE — TELEPHONE ENCOUNTER
Patient called again asking if referral was placed for GI DrNabil      Patient also stated she would like a CT scan order for her abdomen to check for pancreatitis.   She said she knows how she feels and doesn't want a tube put down her throat until she sees wh

## 2021-01-26 NOTE — TELEPHONE ENCOUNTER
[facility] advising per Dr. Kathleen Grant, an appointment is needed to address all of her questions/concerns. She does feel a GI consult will be helpful. Needs to be seen first.  Advised to Continue omeprazole as it is stronger than famotidine.    She can take famotid

## 2021-02-01 ENCOUNTER — OFFICE VISIT (OUTPATIENT)
Dept: FAMILY MEDICINE CLINIC | Facility: CLINIC | Age: 46
End: 2021-02-01

## 2021-02-01 VITALS
HEART RATE: 105 BPM | TEMPERATURE: 99 F | BODY MASS INDEX: 45 KG/M2 | HEIGHT: 61 IN | SYSTOLIC BLOOD PRESSURE: 124 MMHG | DIASTOLIC BLOOD PRESSURE: 86 MMHG | RESPIRATION RATE: 16 BRPM | OXYGEN SATURATION: 98 %

## 2021-02-01 DIAGNOSIS — Z87.19 HISTORY OF ACUTE PANCREATITIS: ICD-10-CM

## 2021-02-01 DIAGNOSIS — R10.13 EPIGASTRIC PAIN: Primary | ICD-10-CM

## 2021-02-01 PROCEDURE — 99213 OFFICE O/P EST LOW 20 MIN: CPT | Performed by: FAMILY MEDICINE

## 2021-02-01 PROCEDURE — 3074F SYST BP LT 130 MM HG: CPT | Performed by: FAMILY MEDICINE

## 2021-02-01 PROCEDURE — 3079F DIAST BP 80-89 MM HG: CPT | Performed by: FAMILY MEDICINE

## 2021-02-01 PROCEDURE — 3008F BODY MASS INDEX DOCD: CPT | Performed by: FAMILY MEDICINE

## 2021-02-01 NOTE — PATIENT INSTRUCTIONS
Famotidine 20 mg twice a day you could take instead of omeprazole if GI recommends to do that. Try to get in with Dr. Kai Stock soon. You probably need endoscopy. Stay on blander food for now. Labs ordered and ultrasound abdomen ordered.

## 2021-02-01 NOTE — TELEPHONE ENCOUNTER
Future Appointments   Date Time Provider Fe Amanda   2/1/2021  3:00 PM Jhon Estes MD [facility]

## 2021-02-01 NOTE — PROGRESS NOTES
Faraz Torotiblanche Gregory IS A 50year old female HERE FOR Patient presents with:  Abdominal Pain       History of present illness:     Pt questions from last week. 1. How long should I be taking 40mg of Omeprazole in the morning with empty tummy?  How many da High-density lipoprotein deficiency     \"low HDL\"   • Hypertension    • Other ill-defined conditions(739.89)     \"Microcytic indices;  Needs labs done for iron studies and/or thalassemia!!\"   • Other psoriasis    • Pancreatitis    • Paresthesia    • BENT[place] Other (kidney failure) Other         aunt and uncle, d/t DM       Social history:       Social History    Socioeconomic History      Marital status:       Spouse name: Not on file      Number of children: 0      Years of education: Not on file Seat Belt: Yes        Self-Exams: Yes    Social History Narrative      Not on file      Review of systems:     BMs ok, loose a few times. Had been drinking 1 bottle carbonated mineral water daily (16 oz).   Has to pause for deep breaths sometimes while z

## 2021-02-19 ENCOUNTER — OFFICE VISIT (OUTPATIENT)
Dept: FAMILY MEDICINE CLINIC | Facility: CLINIC | Age: 46
End: 2021-02-19

## 2021-02-19 VITALS
OXYGEN SATURATION: 99 % | SYSTOLIC BLOOD PRESSURE: 122 MMHG | TEMPERATURE: 98 F | HEART RATE: 98 BPM | RESPIRATION RATE: 16 BRPM | HEIGHT: 61 IN | BODY MASS INDEX: 45 KG/M2 | DIASTOLIC BLOOD PRESSURE: 78 MMHG

## 2021-02-19 DIAGNOSIS — M65.4 RADIAL STYLOID TENOSYNOVITIS (DE QUERVAIN): Primary | ICD-10-CM

## 2021-02-19 PROCEDURE — L3923 HFO WITHOUT JOINTS PRE CST: HCPCS | Performed by: FAMILY MEDICINE

## 2021-02-19 PROCEDURE — 3008F BODY MASS INDEX DOCD: CPT | Performed by: FAMILY MEDICINE

## 2021-02-19 PROCEDURE — 3078F DIAST BP <80 MM HG: CPT | Performed by: FAMILY MEDICINE

## 2021-02-19 PROCEDURE — 99213 OFFICE O/P EST LOW 20 MIN: CPT | Performed by: FAMILY MEDICINE

## 2021-02-19 PROCEDURE — 3074F SYST BP LT 130 MM HG: CPT | Performed by: FAMILY MEDICINE

## 2021-02-19 NOTE — PROGRESS NOTES
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      Teach mouth daily. 90 tablet 0   • Multiple Vitamins-Minerals (TAB-A-JORGE) Oral Tab Take 1 tablet by mouth daily. • Biotin 5000 MCG Oral Cap Take 5,000 mcg by mouth daily.          Allergy:        Codeine                 RASH  Seafood                 ANAPHYLA on file        Attends meetings of clubs or organizations: Not on file        Relationship status: Not on file      Intimate partner violence        Fear of current or ex partner: Not on file        Emotionally abused: Not on file        Physically abused: Tendonitis and Tenosynovitis  What are tendonitis and tenosynovitis? Tendons are strong cords of tissue that connect muscles to bones. Tendonitis is when a tendon is inflamed. It can happen to any tendon in the body.  When a tendon is inflamed, it can ca moved  · Swelling from fluid and inflammation  · A grating feeling when moving the joint  The symptoms of tendonitis can seem like other health problems. Talk with your healthcare provider for a diagnosis. How are tendonitis and tenosynovitis diagnosed? steps  Tips to help you get the most from a visit to your healthcare provider:  · Know the reason for your visit and what you want to happen. · Before your visit, write down questions you want answered.   · Bring someone with you to help you ask questions

## 2021-02-19 NOTE — PATIENT INSTRUCTIONS
Voltaren gel over the counter 2 grams to sore area of wrist and     thumb Spica splint to immobilize the thumb. Ibuprofen 2-3 tablets at a time 3 times a day may help for inflammation if the topical gel isn't helpful.      Ice at times may help reliev caused by strain, overuse, injury, or too much exercise. They may also be linked to a disease such as diabetes, rheumatoid arthritis, or infection. What are the symptoms of tendonitis and tenosynovitis?   Symptoms may include:  · Pain in the tendon when mo tendonitis and trigger finger or trigger thumb. · Tendonitis can be caused by strain, overuse, injury, and too much exercise. · Treatment may include changing your activities, icing the area to reduce pain, and using a splint to limit movement.     Next s

## 2021-02-25 ENCOUNTER — TELEPHONE (OUTPATIENT)
Dept: FAMILY MEDICINE CLINIC | Facility: CLINIC | Age: 46
End: 2021-02-25

## 2021-02-25 DIAGNOSIS — M25.532 LEFT WRIST PAIN: Primary | ICD-10-CM

## 2021-02-25 DIAGNOSIS — M65.4 RADIAL STYLOID TENOSYNOVITIS (DE QUERVAIN): ICD-10-CM

## 2021-02-25 NOTE — TELEPHONE ENCOUNTER
Called patient who states the pain in her wrist is no better and the bruising is coming back. Unable to use her hand. The splint rubs on the painful part of her wrist. Used topical Voltaren cream for 3 days with no improvement.   Taking Advil, but is havi

## 2021-02-25 NOTE — TELEPHONE ENCOUNTER
Pt called stating she last saw  2-19-21 for her left wrist issue. She said she is no better, wrist is wrapped. Needs direction on what her next step is. She said she cannot use the wrist.      Please advise.

## 2021-02-26 ENCOUNTER — TELEPHONE (OUTPATIENT)
Dept: ORTHOPEDICS CLINIC | Facility: CLINIC | Age: 46
End: 2021-02-26

## 2021-02-26 NOTE — TELEPHONE ENCOUNTER
New patient was referred to Dr Cherylene League by her PCP, Grace Hughes MD. She would like to be seen next week for wrist pain. She is scheduled for 3/4/21 at 11:30am which is not a double time slot. 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  I will resched

## 2021-03-04 ENCOUNTER — OFFICE VISIT (OUTPATIENT)
Dept: ORTHOPEDICS CLINIC | Facility: CLINIC | Age: 46
End: 2021-03-04

## 2021-03-04 VITALS — OXYGEN SATURATION: 99 % | HEART RATE: 103 BPM

## 2021-03-04 DIAGNOSIS — M65.4 TENOSYNOVITIS OF RADIAL STYLOID: Primary | ICD-10-CM

## 2021-03-04 PROCEDURE — 99203 OFFICE O/P NEW LOW 30 MIN: CPT | Performed by: ORTHOPAEDIC SURGERY

## 2021-03-04 PROCEDURE — 20550 NJX 1 TENDON SHEATH/LIGAMENT: CPT | Performed by: ORTHOPAEDIC SURGERY

## 2021-03-04 RX ORDER — TRIAMCINOLONE ACETONIDE 40 MG/ML
40 INJECTION, SUSPENSION INTRA-ARTICULAR; INTRAMUSCULAR ONCE
Status: COMPLETED | OUTPATIENT
Start: 2021-03-04 | End: 2021-03-04

## 2021-03-04 RX ADMIN — TRIAMCINOLONE ACETONIDE 40 MG: 40 INJECTION, SUSPENSION INTRA-ARTICULAR; INTRAMUSCULAR at 12:00:00

## 2021-03-04 NOTE — PROCEDURES
Written consent was obtained. Skin was prepped with ChloraPrep. 1 mL of 40 mg of Kenalog and 1 mL of 1% lidocaine was injected into the left 1st dorsal compartment. Patient tolerated the procedure. No complications were encountered.   Band-Aid was appli

## 2021-03-04 NOTE — H&P
[facility] New Patient Note    CC: Left wrist pain    HPI: This 50year old  female presents with left wrist pain that is been going on for least 2 weeks now.   Pain is significant and localized to the radial aspect of the wrist.  She feels like it wa Performed by Jennifer Cerda MD at [facility] MAIN OR     Current Outpatient Medications   Medication Sig Dispense Refill   • Irbesartan-hydroCHLOROthiazide 300-12.5 MG Oral Tab Take 1 tablet by mouth daily.  90 tablet 0   • Multiple Vitamins-Minerals (TAB-A-VI Skin: Skin is warm and dry. Not diaphoretic. Psychiatric: Patient has a normal mood and affect and behavior is normal. Judgment and thought content normal.   Left wrist: Tenderness to palpation of the first dorsal compartment.   There is psoriatic plaque

## 2021-03-08 ENCOUNTER — OFFICE VISIT (OUTPATIENT)
Dept: FAMILY MEDICINE CLINIC | Facility: CLINIC | Age: 46
End: 2021-03-08

## 2021-03-08 ENCOUNTER — TELEPHONE (OUTPATIENT)
Dept: FAMILY MEDICINE CLINIC | Facility: CLINIC | Age: 46
End: 2021-03-08

## 2021-03-08 VITALS
TEMPERATURE: 98 F | OXYGEN SATURATION: 98 % | HEIGHT: 61 IN | SYSTOLIC BLOOD PRESSURE: 122 MMHG | BODY MASS INDEX: 43.43 KG/M2 | WEIGHT: 230 LBS | HEART RATE: 105 BPM | RESPIRATION RATE: 16 BRPM | DIASTOLIC BLOOD PRESSURE: 80 MMHG

## 2021-03-08 DIAGNOSIS — R10.9 ABDOMINAL PAIN, UNSPECIFIED ABDOMINAL LOCATION: ICD-10-CM

## 2021-03-08 DIAGNOSIS — R73.03 PREDIABETES: Primary | ICD-10-CM

## 2021-03-08 DIAGNOSIS — E16.2 HYPOGLYCEMIA: ICD-10-CM

## 2021-03-08 DIAGNOSIS — E66.01 OBESITY, CLASS III, BMI 40-49.9 (MORBID OBESITY) (HCC): ICD-10-CM

## 2021-03-08 LAB
GLUCOSE BLOOD: 103
TEST STRIP EXPIRATION DATE: NORMAL DATE
TEST STRIP LOT #: NORMAL NUMERIC

## 2021-03-08 PROCEDURE — 3074F SYST BP LT 130 MM HG: CPT | Performed by: FAMILY MEDICINE

## 2021-03-08 PROCEDURE — 82947 ASSAY GLUCOSE BLOOD QUANT: CPT | Performed by: FAMILY MEDICINE

## 2021-03-08 PROCEDURE — 99214 OFFICE O/P EST MOD 30 MIN: CPT | Performed by: FAMILY MEDICINE

## 2021-03-08 PROCEDURE — 3008F BODY MASS INDEX DOCD: CPT | Performed by: FAMILY MEDICINE

## 2021-03-08 PROCEDURE — 3079F DIAST BP 80-89 MM HG: CPT | Performed by: FAMILY MEDICINE

## 2021-03-08 NOTE — PROGRESS NOTES
Edison Gregory IS A 50year old female HERE FOR Patient presents with:  Dizziness       History of present illness:     Had steroid shot Friday after noon for tendinitis, no immediate reaction.    Saturday after noon felt shaky and dizzy, shakiness continu saranya Craig, for large and growing fibroid that proved benign   • ROBOT-ASSISTED LAPAROSCOPIC HYSTERECTOMY Bilateral 8/13/2019    Performed by Alisia Sheth MD at [facility] MAIN OR       Current medications:     Current Outpatient Medications   Medicatio Occupational Exposure: No        Hobby Hazards: Not Asked        Sleep Concern: No        Stress Concern: Yes        Weight Concern: Yes        Special Diet: Yes          Weight watchers        Back Care: Not Asked        Exercise: No          walks 30 min -     ASSAY QUANTITATIVE, GLUCOSE  -     OP REFERRAL TO NUTRITIONIST/DIETICIAN    Hypoglycemia  -     OP REFERRAL TO NUTRITIONIST/DIETICIAN    Abdominal pain, unspecified abdominal location  -     OP REFERRAL TO NUTRITIONIST/DIETICIAN    Obesity, Class III hypoglycemia:   · First check your blood sugar. If it's too low (out of your target range), eat or drink 15 to 20 grams of fast-acting sugar.  This may be 3 to 4 glucose tablets, 4 ounces (half a cup) of fruit juice or regular (nondiet) soda, or 1 tablespoo coworkers know the signs of low blood sugar. Tell them what to do if your blood sugar falls very low and you can’t treat yourself. · Keep a glucagon emergency kit handy. Be sure your family, friends, and coworkers know how and when to use it.  Check it oft have symptoms of low blood sugar. Fiber  Fiber comes from plant foods. Your body can't digest most fiber.  Instead of raising blood sugar levels like other carbs, fiber stops blood sugar from rising too fast. Fiber is found in fruits, vegetables, whole gra label, you should be able to get an idea how many carbs there are per serving by using a book or website. Two very important lines to look at on the label are the serving size and the total carbohydrate amount per serving.  Here are some tips for using korin to run. And you need the right kind of food to function. To keep your energy level up, your body needs food that has carbohydrates. But carbs raise blood sugar levels higher and faster than other kinds of food.  Your dietitian will work with you to figure o track of the carbohydrates you eat at each meal.  Carbs come from a variety of foods. These include grains, starchy vegetables, fruit, milk, beans, and snack foods. You can either count carbohydrate grams or carbohydrate servings.  When you count carbohydra serving sizes and total carbs to find the products that work best for you.   · Don't forget protein and fat. With the focus on carb counting, it might be easy to forget protein and fat in your meals.  Don't forget to include sources of protein and healthy f

## 2021-03-08 NOTE — TELEPHONE ENCOUNTER
Pt calling stating that she went to see Dr. Dominguez Cristina and got a Cortizone shot in her wrist, before receiving the shot the doctor asked if she had diabetes. She said no, but really didn't know. Said that last night she was feeling really foggy and shaky.  D

## 2021-03-08 NOTE — TELEPHONE ENCOUNTER
Returned call to patient who states had a cortisone shot in her wrist on Friday. Last night had an episode where she became shakey, lightheaded, and foggy. Couldn't sleep and developed a HA. Feels better today.  Wants to know if it could be related to he

## 2021-03-08 NOTE — PATIENT INSTRUCTIONS
For your basic diet, try change to whole grain breads, oatmeal, brown rice; protein; & veggies/fruit. Plate rule: 1/4 protein, 1/4 carbs/starch, and 1/2 fruits/veggies/salad/soup etc. Eat something healthy every 3 hours to avoid low sugar symptoms.      See as milk or nuts to treat hypoglycemia. Protein may increase your insulin response. It may lower your blood sugar even more. · Wait 15 minutes. Then recheck your blood sugar if you can.   · If your blood sugar is still too low, repeat the steps above and ch blood sugar. If you have unexplained hypoglycemia or hypoglycemia several times, call your healthcare provider. [employer] last reviewed this educational content on 11/1/2018  © 7867-2619 [employer]. All rights reserved.  This information is balance of carbs, physical activity, and medicine. The amount of carbs you need will be different from what other people need. How much you need depends on many things. These include your health, the medicines you take, and how active you are.  Your healthc more than the listed serving size, you may have to double or triple the other information on the label.   · Check the total grams of carbohydrates. Total carbohydrate from the label includes sugar, starch, and fiber.  Be sure to use the total carbohydrate n some vegetables, and beans. Grain products include bread, pasta, cereal, and tortillas. Starchy vegetables include potatoes, peas, corn, lima beans, yams, and squash. Kidney beans, cardenas beans, black beans, garbanzo beans, and lentils also have starches. carbohydrates (1 serving of carbohydrates):  · 1/2 cup of canned or frozen fruit  · A small piece of fresh fruit (4 ounces)  · 1 slice of bread  · 1/2 cup of oatmeal  · 1/3 cup of rice  · 4 to 6 crackers  · 1/2 English muffin  · 1/2 cup of black beans  · 1 you have diabetes, limit the amount of sodium to less than 2,300 mg a day. It’s also important to be consistent with the amount of carbs and time you eat when taking a fixed dose of diabetes medicine.  Work with your healthcare provider or dietitian if you

## 2021-03-09 ENCOUNTER — PATIENT MESSAGE (OUTPATIENT)
Dept: FAMILY MEDICINE CLINIC | Facility: CLINIC | Age: 46
End: 2021-03-09

## 2021-03-10 NOTE — TELEPHONE ENCOUNTER
From: Xochitl Gregory  To: Mckenzie Chang MD  Sent: 3/9/2021 11:25 AM CST  Subject: Visit Follow-up Chelsea Antoine, Dr. Precious Montelongo.  I was researching reasons for low blood sugar levels, and I learned that it could be due to the pancreas or liver issues s

## 2021-03-12 ENCOUNTER — HOSPITAL ENCOUNTER (OUTPATIENT)
Dept: ULTRASOUND IMAGING | Age: 46
Discharge: HOME | End: 2021-03-12
Attending: FAMILY MEDICINE
Payer: COMMERCIAL

## 2021-03-12 DIAGNOSIS — Z87.19 HISTORY OF ACUTE PANCREATITIS: ICD-10-CM

## 2021-03-12 DIAGNOSIS — R10.13 EPIGASTRIC PAIN: ICD-10-CM

## 2021-03-12 PROCEDURE — 76700 US EXAM ABDOM COMPLETE: CPT | Performed by: FAMILY MEDICINE

## 2021-03-15 NOTE — LETTER
02/04/20        Fitz Gregory  [id number] [place] [address]      Dear Fitz Pereira,    [facility] records indicate that you have outstanding lab work and or testing that was ordered for you and has not yet been completed:  Orders Placed This Encounter Eldon Mercado, Girl [[id number]]    Labor Events     labor?: No   steroids?: None  Antibiotics received during labor?: No  Antibiotics (enter # doses in comment): none  Rupture date/time: 3/15/2021 0702     Rupture type: AROM  Fluid color: Clear  I Acrocyanotic Completely pink    Heart rate Absent <100 bpm >100 bpm    Reflex irritability No response Grimace Cry or active withdrawal    Muscle tone Limp Some flexion Active motion    Respiratory effort Absent Weak cry; hypoventilation Good, crying

## 2021-03-20 ENCOUNTER — PATIENT MESSAGE (OUTPATIENT)
Dept: FAMILY MEDICINE CLINIC | Facility: CLINIC | Age: 46
End: 2021-03-20

## 2021-03-22 ENCOUNTER — APPOINTMENT (OUTPATIENT)
Dept: GENERAL RADIOLOGY | Facility: HOSPITAL | Age: 46
End: 2021-03-22
Attending: PHYSICIAN ASSISTANT
Payer: COMMERCIAL

## 2021-03-22 ENCOUNTER — APPOINTMENT (OUTPATIENT)
Dept: CT IMAGING | Facility: HOSPITAL | Age: 46
End: 2021-03-22
Attending: PHYSICIAN ASSISTANT
Payer: COMMERCIAL

## 2021-03-22 ENCOUNTER — PATIENT MESSAGE (OUTPATIENT)
Dept: FAMILY MEDICINE CLINIC | Facility: CLINIC | Age: 46
End: 2021-03-22

## 2021-03-22 ENCOUNTER — HOSPITAL ENCOUNTER (EMERGENCY)
Facility: HOSPITAL | Age: 46
Discharge: HOME | End: 2021-03-22
Attending: EMERGENCY MEDICINE
Payer: COMMERCIAL

## 2021-03-22 VITALS
HEIGHT: 61 IN | BODY MASS INDEX: 43.43 KG/M2 | WEIGHT: 230 LBS | SYSTOLIC BLOOD PRESSURE: 150 MMHG | DIASTOLIC BLOOD PRESSURE: 85 MMHG | OXYGEN SATURATION: 97 % | RESPIRATION RATE: 16 BRPM | HEART RATE: 82 BPM | TEMPERATURE: 98 F

## 2021-03-22 DIAGNOSIS — I10 ELEVATED BLOOD PRESSURE READING IN OFFICE WITH DIAGNOSIS OF HYPERTENSION: Primary | ICD-10-CM

## 2021-03-22 DIAGNOSIS — R42 DIZZINESS: ICD-10-CM

## 2021-03-22 DIAGNOSIS — R51.9 ACUTE NONINTRACTABLE HEADACHE, UNSPECIFIED HEADACHE TYPE: ICD-10-CM

## 2021-03-22 LAB
ALBUMIN SERPL-MCNC: 3.3 G/DL (ref 3.4–5)
ALBUMIN/GLOB SERPL: 0.8 {RATIO} (ref 1–2)
ALP LIVER SERPL-CCNC: 93 U/L
ALT SERPL-CCNC: 35 U/L
ANION GAP SERPL CALC-SCNC: 7 MMOL/L (ref 0–18)
AST SERPL-CCNC: 27 U/L (ref 15–37)
BASOPHILS # BLD AUTO: 0.06 X10(3) UL (ref 0–0.2)
BASOPHILS NFR BLD AUTO: 0.7 %
BILIRUB SERPL-MCNC: 0.4 MG/DL (ref 0.1–2)
BILIRUB UR QL STRIP.AUTO: NEGATIVE
BUN BLD-MCNC: 15 MG/DL (ref 7–18)
BUN/CREAT SERPL: 24.2 (ref 10–20)
CALCIUM BLD-MCNC: 8.9 MG/DL (ref 8.5–10.1)
CHLORIDE SERPL-SCNC: 105 MMOL/L (ref 98–112)
CLARITY UR REFRACT.AUTO: CLEAR
CO2 SERPL-SCNC: 25 MMOL/L (ref 21–32)
COLOR UR AUTO: YELLOW
CREAT BLD-MCNC: 0.62 MG/DL
D-DIMER: 0.38 UG/ML FEU (ref ?–0.5)
DEPRECATED RDW RBC AUTO: 44.2 FL (ref 35.1–46.3)
EOSINOPHIL # BLD AUTO: 0.1 X10(3) UL (ref 0–0.7)
EOSINOPHIL NFR BLD AUTO: 1.2 %
ERYTHROCYTE [DISTWIDTH] IN BLOOD BY AUTOMATED COUNT: 14.9 % (ref 11–15)
GLOBULIN PLAS-MCNC: 4.3 G/DL (ref 2.8–4.4)
GLUCOSE BLD-MCNC: 125 MG/DL (ref 70–99)
GLUCOSE UR STRIP.AUTO-MCNC: NEGATIVE MG/DL
HCT VFR BLD AUTO: 40.3 %
HGB BLD-MCNC: 12.9 G/DL
IMM GRANULOCYTES # BLD AUTO: 0.03 X10(3) UL (ref 0–1)
IMM GRANULOCYTES NFR BLD: 0.3 %
KETONES UR STRIP.AUTO-MCNC: NEGATIVE MG/DL
LEUKOCYTE ESTERASE UR QL STRIP.AUTO: NEGATIVE
LYMPHOCYTES # BLD AUTO: 1.87 X10(3) UL (ref 1–4)
LYMPHOCYTES NFR BLD AUTO: 21.6 %
M PROTEIN MFR SERPL ELPH: 7.6 G/DL (ref 6.4–8.2)
MCH RBC QN AUTO: 26.4 PG (ref 26–34)
MCHC RBC AUTO-ENTMCNC: 32 G/DL (ref 31–37)
MCV RBC AUTO: 82.6 FL
MONOCYTES # BLD AUTO: 0.58 X10(3) UL (ref 0.1–1)
MONOCYTES NFR BLD AUTO: 6.7 %
NEUTROPHILS # BLD AUTO: 6.03 X10 (3) UL (ref 1.5–7.7)
NEUTROPHILS # BLD AUTO: 6.03 X10(3) UL (ref 1.5–7.7)
NEUTROPHILS NFR BLD AUTO: 69.5 %
NITRITE UR QL STRIP.AUTO: NEGATIVE
OSMOLALITY SERPL CALC.SUM OF ELEC: 286 MOSM/KG (ref 275–295)
PH UR STRIP.AUTO: 6 [PH] (ref 5–8)
PLATELET # BLD AUTO: 328 10(3)UL (ref 150–450)
POTASSIUM SERPL-SCNC: 4 MMOL/L (ref 3.5–5.1)
PROT UR STRIP.AUTO-MCNC: NEGATIVE MG/DL
RBC # BLD AUTO: 4.88 X10(6)UL
RBC UR QL AUTO: NEGATIVE
SODIUM SERPL-SCNC: 137 MMOL/L (ref 136–145)
SP GR UR STRIP.AUTO: 1.01 (ref 1–1.03)
TROPONIN I SERPL-MCNC: <0.045 NG/ML (ref ?–0.04)
UROBILINOGEN UR STRIP.AUTO-MCNC: <2 MG/DL
WBC # BLD AUTO: 8.7 X10(3) UL (ref 4–11)

## 2021-03-22 PROCEDURE — 85025 COMPLETE CBC W/AUTO DIFF WBC: CPT | Performed by: PHYSICIAN ASSISTANT

## 2021-03-22 PROCEDURE — 71045 X-RAY EXAM CHEST 1 VIEW: CPT | Performed by: PHYSICIAN ASSISTANT

## 2021-03-22 PROCEDURE — 84484 ASSAY OF TROPONIN QUANT: CPT | Performed by: PHYSICIAN ASSISTANT

## 2021-03-22 PROCEDURE — 99285 EMERGENCY DEPT VISIT HI MDM: CPT

## 2021-03-22 PROCEDURE — 93010 ELECTROCARDIOGRAM REPORT: CPT

## 2021-03-22 PROCEDURE — 85379 FIBRIN DEGRADATION QUANT: CPT | Performed by: PHYSICIAN ASSISTANT

## 2021-03-22 PROCEDURE — 81003 URINALYSIS AUTO W/O SCOPE: CPT | Performed by: PHYSICIAN ASSISTANT

## 2021-03-22 PROCEDURE — 80053 COMPREHEN METABOLIC PANEL: CPT | Performed by: PHYSICIAN ASSISTANT

## 2021-03-22 PROCEDURE — 70450 CT HEAD/BRAIN W/O DYE: CPT | Performed by: PHYSICIAN ASSISTANT

## 2021-03-22 PROCEDURE — 36415 COLL VENOUS BLD VENIPUNCTURE: CPT

## 2021-03-22 PROCEDURE — 93005 ELECTROCARDIOGRAM TRACING: CPT

## 2021-03-22 NOTE — TELEPHONE ENCOUNTER
From: Landen Gregory  To: Priscila Rand MD  Sent: 3/20/2021 4:30 PM CDT  Subject: Visit Follow-up Question    Dr. Aaliyah Jones. This is a follow-up question from our last visit. Last night I felt dizzy again.  I ate a small chocolate bar as you sugge

## 2021-03-22 NOTE — TELEPHONE ENCOUNTER
Triage call transferred. Spoke with pt stating BP 160s/90s with dizziness, intermittent HA, shakiness x3 days.  Pt stating sent mychart message as pt \"knew no one was in the office\"- informed medical staff not available to address mychart messages over (3) slightly limited

## 2021-03-22 NOTE — TELEPHONE ENCOUNTER
From: [facility] Gregory  To: Estelita Wyatt MD  Sent: 3/22/2021 9:59 AM CDT  Subject: Visit Follow-up Question    Hi, Dr. Priyanka Clark. I had very scary nights the past 3 days. I don't believe my blood pressure is working as it should.  I took my blood pressure

## 2021-03-22 NOTE — ED INITIAL ASSESSMENT (HPI)
Patient here with report with dizziness at night that started 3 nights ago with high BP, dizziness when laying down that lasts for hours.

## 2021-03-23 ENCOUNTER — OFFICE VISIT (OUTPATIENT)
Dept: FAMILY MEDICINE CLINIC | Facility: CLINIC | Age: 46
End: 2021-03-23

## 2021-03-23 ENCOUNTER — TELEPHONE (OUTPATIENT)
Dept: FAMILY MEDICINE CLINIC | Facility: CLINIC | Age: 46
End: 2021-03-23

## 2021-03-23 VITALS
HEART RATE: 98 BPM | SYSTOLIC BLOOD PRESSURE: 122 MMHG | OXYGEN SATURATION: 99 % | DIASTOLIC BLOOD PRESSURE: 84 MMHG | BODY MASS INDEX: 43.43 KG/M2 | HEIGHT: 61 IN | RESPIRATION RATE: 16 BRPM | WEIGHT: 230 LBS | TEMPERATURE: 98 F

## 2021-03-23 DIAGNOSIS — I10 ESSENTIAL HYPERTENSION: Primary | ICD-10-CM

## 2021-03-23 LAB
ATRIAL RATE: 97 BPM
P AXIS: 46 DEGREES
P-R INTERVAL: 158 MS
Q-T INTERVAL: 408 MS
QRS DURATION: 148 MS
QTC CALCULATION (BEZET): 518 MS
R AXIS: -37 DEGREES
T AXIS: 70 DEGREES
VENTRICULAR RATE: 97 BPM

## 2021-03-23 PROCEDURE — 99213 OFFICE O/P EST LOW 20 MIN: CPT | Performed by: FAMILY MEDICINE

## 2021-03-23 PROCEDURE — 3008F BODY MASS INDEX DOCD: CPT | Performed by: FAMILY MEDICINE

## 2021-03-23 PROCEDURE — 3079F DIAST BP 80-89 MM HG: CPT | Performed by: FAMILY MEDICINE

## 2021-03-23 PROCEDURE — 3074F SYST BP LT 130 MM HG: CPT | Performed by: FAMILY MEDICINE

## 2021-03-23 RX ORDER — AMLODIPINE BESYLATE 5 MG/1
5 TABLET ORAL DAILY
Qty: 30 TABLET | Refills: 1 | Status: SHIPPED | OUTPATIENT
Start: 2021-03-23 | End: 2021-03-25

## 2021-03-23 RX ORDER — TRIAMTERENE AND HYDROCHLOROTHIAZIDE 37.5; 25 MG/1; MG/1
1 TABLET ORAL DAILY
Qty: 30 TABLET | Refills: 1 | Status: SHIPPED | OUTPATIENT
Start: 2021-03-23 | End: 2021-03-25

## 2021-03-23 NOTE — ED PROVIDER NOTES
Patient Seen in: [facility] Emergency Department      History   Patient presents with:  Hypertension    Stated Complaint: Hypertension, worse at night.      HPI/Subjective:   HPI    17-year-old female with known history of depression, endometriosis, 2005    laparoscopic, Dr. Asuncion Hager   • HYSTERECTOMY     • [facility]    Laparoscopy   • LAPAROSCOPY, SURG, W/VAGINAL HYSTERECTOMY, UTERUS >250GMS; W/REMOVE TUBE(S) &/OR OVARY(S)  08/13/2019    saranya Luna, for large and growing Regular rate & rhythm, S1 and S2 normal, no murmurs, rubs, or gallops  Abdomen:  Soft, non-tender, bowel sounds active all four quadrants, no mass or organomegaly.  No rebound tenderness or guarding                Skin/Hair/Nails:  Skin warm, dry and intact performed through the brain. Dose reduction techniques were used. Dose information is transmitted to the ACR [facility]) NRDR ([address]) which includes the Dose Index Registry.   PATIENT STATED HISTORY: (As transc the patient's previous EKGs and she does have a known history of a left bundle branch block in 2019 and had further work-up related to this.  I did also discussed with the patient her laboratory work including negative troponin, negative D-dimer, mildly kath

## 2021-03-23 NOTE — TELEPHONE ENCOUNTER
Told to see PCP for ER Follow up visit due to elevated BP. First opening is on Thursday afternoon. Please advise. Having dizziness and headaches which is unusual for her. Has not checked BP this morning yet.  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

## 2021-03-23 NOTE — TELEPHONE ENCOUNTER
Returned pts phone call. Pt states she was at ER yesterday for elevated blood pressure accompanied with dizziness and headache. She states that she was released to go home with a  B/p that was approximately 150/85 and was advised to follow up with pcp.  Pt

## 2021-03-23 NOTE — TELEPHONE ENCOUNTER
Called and spoke to pt, stated Dr. Ming Barfield can see pt tonight at 6:40. Pt placed on Dr. Howard Carranza schedule. Advised if any worsening symptoms prior to appt to please go to Er.  Pt verbalized understanding and stated to cancel appt for thursday that was origin

## 2021-03-24 ENCOUNTER — PATIENT MESSAGE (OUTPATIENT)
Dept: FAMILY MEDICINE CLINIC | Facility: CLINIC | Age: 46
End: 2021-03-24

## 2021-03-24 RX ORDER — TRIAMTERENE AND HYDROCHLOROTHIAZIDE 37.5; 25 MG/1; MG/1
1 TABLET ORAL DAILY
Qty: 90 TABLET | Refills: 0 | OUTPATIENT
Start: 2021-03-24

## 2021-03-24 RX ORDER — AMLODIPINE BESYLATE 5 MG/1
TABLET ORAL
Qty: 90 TABLET | Refills: 0 | OUTPATIENT
Start: 2021-03-24

## 2021-03-24 NOTE — PATIENT INSTRUCTIONS
Stop irbesartan. Recommend going ahead with Covid vaccine and rescheduling endoscopy about 4 weeks later. Start amlodipine 5 mg daily. If you get swelling, try 1/2 tablet triamterene/HCTZ as needed.  Also, if blood pressure high in 3 weeks will cons

## 2021-03-24 NOTE — PROGRESS NOTES
Lorin Gregory IS A 50year old female HERE FOR Patient presents with:  Hypertension: medication f/u.         History of present illness:     Feels headaches, head pounding, had problem when she was changed to irbesartan, losartan seems similar, feels pres Sig Dispense Refill   • amLODIPine Besylate 5 MG Oral Tab Take 1 tablet (5 mg total) by mouth daily. 30 tablet 1   • Triamterene-HCTZ 37.5-25 MG Oral Tab Take 1 tablet by mouth daily.  30 tablet 1   • Multiple Vitamins-Minerals (TAB-A-JORGE) Oral Tab Take 1 Exposure: No        Hobby Hazards: Not Asked        Sleep Concern: No        Stress Concern: Yes        Weight Concern: Yes        Special Diet: Yes          Weight watchers        Back Care: Not Asked        Exercise: No          walks 30 min 3-4 times a Value   • Hold Blue 03/22/2021 Auto Resulted    • Hold Lavender 03/22/2021 Auto Resulted    • Hold Lt Green 03/22/2021 Auto Resulted    • Hold Gold 03/22/2021 Auto Resulted    • Glucose 03/22/2021 125*   • Sodium 03/22/2021 137    • Potassium 03/22/2021 4. Absolute Prel* 03/22/2021 6.03    • Neutrophil Absolute 03/22/2021 6.03    • Lymphocyte Absolute 03/22/2021 1.87    • Monocyte Absolute 03/22/2021 0.58    • Eosinophil Absolute 03/22/2021 0.10    • Basophil Absolute 03/22/2021 0.06    • Immature Granulocyt (Bezet) 07/24/2020 469    • P Axis 07/24/2020 37    • R Axis 07/24/2020 69    • T Axis 07/24/2020 59    • Troponin 07/24/2020 <0.045    • PT 07/24/2020 13.4    • INR 07/24/2020 0.99    • PTT 07/24/2020 35.5    • SARS-CoV-2 IgG Antibody 07/24/2020 Negative • Bilirubin, Total 04/20/2020 0.4    • Total Protein 04/20/2020 7.7    • Albumin 04/20/2020 3.3*   • Globulin  04/20/2020 4.4    • A/G Ratio 04/20/2020 0.8*   • LDH 04/20/2020 327*   • Rapid SARS-CoV-2 by PCR 04/20/2020 Not Detected    • WBC 04/20/2020 9 Also, if blood pressure high in 3 weeks will consider diuretic added (Triamterene 37.5/25 mg tablet, would recommend 1/2 tablet daily initially and increase to full tablet as needed based on BP response. )

## 2021-03-25 ENCOUNTER — OFFICE VISIT (OUTPATIENT)
Dept: FAMILY MEDICINE CLINIC | Facility: CLINIC | Age: 46
End: 2021-03-25

## 2021-03-25 ENCOUNTER — TELEPHONE (OUTPATIENT)
Dept: FAMILY MEDICINE CLINIC | Facility: CLINIC | Age: 46
End: 2021-03-25

## 2021-03-25 VITALS
HEART RATE: 93 BPM | HEIGHT: 61 IN | OXYGEN SATURATION: 99 % | RESPIRATION RATE: 16 BRPM | SYSTOLIC BLOOD PRESSURE: 140 MMHG | DIASTOLIC BLOOD PRESSURE: 82 MMHG | BODY MASS INDEX: 43.43 KG/M2 | WEIGHT: 230 LBS | TEMPERATURE: 98 F

## 2021-03-25 DIAGNOSIS — I10 ESSENTIAL HYPERTENSION: Primary | ICD-10-CM

## 2021-03-25 DIAGNOSIS — R03.0 ELEVATED BLOOD PRESSURE READING: ICD-10-CM

## 2021-03-25 PROCEDURE — 3077F SYST BP >= 140 MM HG: CPT | Performed by: FAMILY MEDICINE

## 2021-03-25 PROCEDURE — 3008F BODY MASS INDEX DOCD: CPT | Performed by: FAMILY MEDICINE

## 2021-03-25 PROCEDURE — 3079F DIAST BP 80-89 MM HG: CPT | Performed by: FAMILY MEDICINE

## 2021-03-25 PROCEDURE — 99213 OFFICE O/P EST LOW 20 MIN: CPT | Performed by: FAMILY MEDICINE

## 2021-03-25 RX ORDER — IRBESARTAN 300 MG/1
TABLET ORAL
COMMUNITY
Start: 2021-03-25 | End: 2021-03-25

## 2021-03-25 RX ORDER — LISINOPRIL AND HYDROCHLOROTHIAZIDE 12.5; 1 MG/1; MG/1
1 TABLET ORAL DAILY
Qty: 30 TABLET | Refills: 0 | Status: SHIPPED | OUTPATIENT
Start: 2021-03-25 | End: 2021-04-24

## 2021-03-25 RX ORDER — LISINOPRIL AND HYDROCHLOROTHIAZIDE 12.5; 1 MG/1; MG/1
1 TABLET ORAL DAILY
Qty: 90 TABLET | Refills: 0 | OUTPATIENT
Start: 2021-03-25 | End: 2021-04-24

## 2021-03-25 NOTE — TELEPHONE ENCOUNTER
Triage call transferred to me after pt made an appt with Dr. Patrizia Macdonald for today at 2. Pt states she paged on call Dr. Maria Alejandra Lam due to elevated b/p, flushed red cheeks, headache and dizziness. B/p last night was 183/101, 172/107.  Dr. Maria Alejandra Lam advised pt on medica

## 2021-03-25 NOTE — TELEPHONE ENCOUNTER
Pt paged last night at 11:55 pm. States started on new med for HTN, Amlodipine 5mg daily , has taken 2 doses so far and BP has been high all day, 160-1802/100-112. Having facial flushing, headaches and not feeling good. No CP,no SOB, no palpitations.   Stat

## 2021-03-25 NOTE — PROGRESS NOTES
Ezra Gregory IS A 50year old female HERE FOR Patient presents with:  Hypertension       History of present illness:     Face turned red after taking amlodipine, has headache persisting all week.  BP was high again last night, pt worried because it's bee Laterality Date   • ANGIOGRAM  2015   • CHOLECYSTECTOMY  2005    laparoscopic, Dr. Zahida Frederick   • HYSTERECTOMY     • [facility]    Laparoscopy   • LAPAROSCOPY, SURG, W/VAGINAL HYSTERECTOMY, UTERUS >250GMS; W/REMOVE TUBE(S) &/OR OVARY(S Currently        Partners: Male    Other Topics      Concerns:         Service: Not Asked        Blood Transfusions: Not Asked        Caffeine Concern: Yes          1 cup coffee daily        Occupational Exposure: No        Hobby Hazards: Not Asked hypertension causes. Test results:   None recent    Assessment & Plan:   Darrick Weiner was seen today for hypertension.     Diagnoses and all orders for this visit:    Essential hypertension  -     CARDIO - INTERNAL    Elevated blood pressure reading, uncle

## 2021-03-25 NOTE — PATIENT INSTRUCTIONS
Try lisinopril 10/12.5 hydrochlorothiazide daily for 2-3 weeks, recheck blood pressure here in 3 weeks. Check pressure at home twice a day, before dose and 2-4 hours afterward.     Proceed with cardiology referral for other causes of hypertension and other

## 2021-03-25 NOTE — TELEPHONE ENCOUNTER
Hypertension Medications Protocol [id number]/25/2021 02:41 PM   CMP or BMP in past 12 months Protocol Details    Last serum creatinine< 2.0     Appointment in past 6 or next 3 months      LOV  3/25/21     LAST LAB n/a     LAST RX  3/25/2021  30 tabs     Next

## 2021-03-25 NOTE — TELEPHONE ENCOUNTER
From: Abe Gregory  To: Foreign Bella MD  Sent: 3/24/2021 9:18 AM CDT  Subject: Visit Follow-up Question    Hi, Dr. Heidy Posey. I took the new medication last night. Are the headaches just a side effect initially and then they go away?  If so, about how

## 2021-03-26 ENCOUNTER — TELEPHONE (OUTPATIENT)
Dept: CARDIOLOGY | Age: 46
End: 2021-03-26

## 2021-03-29 NOTE — ED PROVIDER NOTES
"""s/p T OD 3/22/2021. "" Patient Seen in: [facility] [place] [facility]    History   Patient presents with:  Dizziness (neurologic)  Dyspnea TRIPP SOB (respiratory)  Chest Pain    Stated Complaint: lightheaded x 3 days    HPI    This 40-year-old female presents the office w ill-defined conditions(799.89)     \"Microcytic indices;  Needs labs done for iron studies and/or thalassemia!!\"   • Other psoriasis    • Pancreatitis    • Paresthesia    • Unspecified essential hypertension     also hx essential HTN, benign   • Unspecifie normal size, airway patent, uvula midline  NECK:  No cervical lymphadenopathy. No thyromegaly,  HEART: Regular rate and rhythm, no S3, S4 or murmur noted. LUNGS: Clear to ausculation. No retractions or tachypnea noted.   ABDOMEN: Soft, nontender, no guardi EKG  Dizziness    Disposition:   Ic to ed    Follow-up:  [facility]  [phone]  Today        Medications Prescribed:  Current Discharge Medication List

## 2021-03-30 ENCOUNTER — IMMUNIZATION (OUTPATIENT)
Dept: LAB | Facility: HOSPITAL | Age: 46
End: 2021-03-30
Attending: HOSPITALIST
Payer: COMMERCIAL

## 2021-03-30 DIAGNOSIS — Z23 NEED FOR VACCINATION: Primary | ICD-10-CM

## 2021-03-30 PROCEDURE — 0011A SARSCOV2 VAC 100MCG/0.5ML IM: CPT

## 2021-04-14 ENCOUNTER — TELEPHONE (OUTPATIENT)
Dept: FAMILY MEDICINE CLINIC | Facility: CLINIC | Age: 46
End: 2021-04-14

## 2021-04-14 ENCOUNTER — PATIENT MESSAGE (OUTPATIENT)
Dept: FAMILY MEDICINE CLINIC | Facility: CLINIC | Age: 46
End: 2021-04-14

## 2021-04-14 NOTE — TELEPHONE ENCOUNTER
From: Mary Gregory  To: Pineda Ahuja MD  Sent: 4/14/2021 9:44 AM CDT  Subject: Prescription Question    Hi, Dr. Kia Avila. You have me on 10 mg /12.5 of lisonopril. My blood pressure has been high the past several days.  I have been feeling a little di

## 2021-04-14 NOTE — TELEPHONE ENCOUNTER
Attempted to call patient for more symptom detail. Detailed VMML advising patient to check B/P 2-4 hrs after taking medication. If she has severe HA with N/V, visual changes, weakness, N/T, facial droop or slurred speech, she should go ED.   Advised infor

## 2021-04-14 NOTE — TELEPHONE ENCOUNTER
Pt LVM asking to speak with one of Dr. Amrita Pope. Val Palmer it was about her Blood Pressure. Last OV title says\"elevated BP reading\" please advise. Pt said she was going to send a my chart message as well.

## 2021-04-15 ENCOUNTER — TELEPHONE (OUTPATIENT)
Dept: FAMILY MEDICINE CLINIC | Facility: CLINIC | Age: 46
End: 2021-04-15

## 2021-04-15 ENCOUNTER — TELEPHONE (OUTPATIENT)
Dept: CARDIOLOGY | Age: 46
End: 2021-04-15

## 2021-04-15 RX ORDER — LISINOPRIL AND HYDROCHLOROTHIAZIDE 25; 20 MG/1; MG/1
1 TABLET ORAL DAILY
Refills: 0 | Status: CANCELLED | OUTPATIENT
Start: 2021-04-15

## 2021-04-15 NOTE — TELEPHONE ENCOUNTER
Dr Dante Umana,  Please advise    Josi Robbins  to Cesario Babinski, MD     9:22 AM  I took two 10mg of lisonopril yesterday around 12:30pm, and last night my blood pressure was 155/93.  I felt dizzy in my head and had a headache.      So I took another 10

## 2021-04-15 NOTE — TELEPHONE ENCOUNTER
Pt called stating she sent a My Chart message this morning about Blood pressure med issues & running out of it. Wants an answer today.

## 2021-04-19 ENCOUNTER — TELEPHONE (OUTPATIENT)
Dept: CARDIOLOGY | Age: 46
End: 2021-04-19

## 2021-04-19 ENCOUNTER — TELEPHONE (OUTPATIENT)
Dept: FAMILY MEDICINE CLINIC | Facility: CLINIC | Age: 46
End: 2021-04-19

## 2021-04-19 DIAGNOSIS — I15.9 SECONDARY HYPERTENSION: Primary | ICD-10-CM

## 2021-04-19 RX ORDER — MECLIZINE HCL 12.5 MG/1
TABLET ORAL 3 TIMES DAILY PRN
Qty: 60 TABLET | Refills: 0 | Status: CANCELLED | OUTPATIENT
Start: 2021-04-19

## 2021-04-19 RX ORDER — LISINOPRIL 10 MG/1
10 TABLET ORAL
COMMUNITY
End: 2021-06-02 | Stop reason: SDUPTHER

## 2021-04-19 RX ORDER — LISINOPRIL AND HYDROCHLOROTHIAZIDE 25; 20 MG/1; MG/1
1 TABLET ORAL EVERY MORNING
COMMUNITY

## 2021-04-19 NOTE — TELEPHONE ENCOUNTER
Called patient, ML to explain POC per 's note. Requested a call back if she wants rx for meclizine. Also informed cardiology referral has been authorized since 3/25/21. Noted appt has already been scheduled.   Advised copy of authorization was

## 2021-04-19 NOTE — TELEPHONE ENCOUNTER
She could try meclizine 12.5 mg 1 or 2 pills 3  times daily for dizziness. BP of 140-150/90 does not usually cause vertigo. Meclizine is available OTC as 25 mg tablets, or we can send rx. She saw DR. Shayy Nicholson in fall 2017 for dizziness.  Then went

## 2021-04-19 NOTE — TELEPHONE ENCOUNTER
Pt left a vm stating she responded to Dr Abdirizak Alegre My Chart message. She still is experiencing dizziness, her head is spinning. Slight headaches, no fever or other symptoms.   She stated all caused from the incorrect dosage of the blood pressure medication

## 2021-04-19 NOTE — TELEPHONE ENCOUNTER
Noted per TE from Cardiologist 4/15/21, patient has appt scheduled with Dr. Rico De Los Santos on 4/23/21. Was offered an earlier appt but declined. Copy of Referral and authorization faxed to Dr. Houston Lines office, confirmation fax received.     Dr. Stacy Ramirez, please see

## 2021-04-19 NOTE — TELEPHONE ENCOUNTER
Tell her continue same treatment until seeing Dr Callie Monet on Friday. Take 20-25 mg lisinopril hydrochlorothiazide in AM and only 10 mg in afternoon. If she gets hives or rash or feeling of swollen airway or throat closing then go to ER and stop lisinopril.  B

## 2021-04-19 NOTE — TELEPHONE ENCOUNTER
Patient returned call. States symptoms she is having now are not the same as when she saw Dr. Nancy Gil. She is not having Vertigo. She feels blood rushing to her face and head, feels foggy, can't concentrate, develops a headache, feels lightheaded.   Sympto

## 2021-04-20 NOTE — TELEPHONE ENCOUNTER
Detailed VMML with Dr. Josr Ye response per note below.     Future Appointments   Date Time Provider Fe Charlesi   4/22/2021  3:20 PM Hai Velazquez MD [facility]   4/27/2021  1:50 PM Anastacia. Meena [address] [facility] 0[facility] Gianna Tyler  EM

## 2021-04-21 ENCOUNTER — TELEPHONE (OUTPATIENT)
Dept: CARDIOLOGY | Age: 46
End: 2021-04-21

## 2021-04-21 ENCOUNTER — LAB ENCOUNTER (OUTPATIENT)
Dept: LAB | Age: 46
End: 2021-04-21
Attending: INTERNAL MEDICINE
Payer: COMMERCIAL

## 2021-04-21 ENCOUNTER — HOSPITAL ENCOUNTER (OUTPATIENT)
Dept: ULTRASOUND IMAGING | Age: 46
Discharge: HOME | End: 2021-04-21
Attending: INTERNAL MEDICINE
Payer: COMMERCIAL

## 2021-04-21 DIAGNOSIS — Z13.0 SCREENING FOR DEFICIENCY ANEMIA: ICD-10-CM

## 2021-04-21 DIAGNOSIS — Z13.1 SCREENING FOR DIABETES MELLITUS: ICD-10-CM

## 2021-04-21 DIAGNOSIS — I15.9 SECONDARY HYPERTENSION: ICD-10-CM

## 2021-04-21 DIAGNOSIS — R10.13 EPIGASTRIC PAIN: ICD-10-CM

## 2021-04-21 DIAGNOSIS — Z87.19 HISTORY OF ACUTE PANCREATITIS: ICD-10-CM

## 2021-04-21 DIAGNOSIS — Z13.220 SCREENING, LIPID: ICD-10-CM

## 2021-04-21 PROCEDURE — 80053 COMPREHEN METABOLIC PANEL: CPT

## 2021-04-21 PROCEDURE — 93975 VASCULAR STUDY: CPT | Performed by: INTERNAL MEDICINE

## 2021-04-21 PROCEDURE — 80061 LIPID PANEL: CPT

## 2021-04-21 PROCEDURE — 82150 ASSAY OF AMYLASE: CPT

## 2021-04-21 PROCEDURE — 85025 COMPLETE CBC W/AUTO DIFF WBC: CPT

## 2021-04-21 PROCEDURE — 83036 HEMOGLOBIN GLYCOSYLATED A1C: CPT

## 2021-04-21 PROCEDURE — 83690 ASSAY OF LIPASE: CPT

## 2021-04-21 PROCEDURE — 36415 COLL VENOUS BLD VENIPUNCTURE: CPT

## 2021-04-21 PROCEDURE — 76775 US EXAM ABDO BACK WALL LIM: CPT | Performed by: INTERNAL MEDICINE

## 2021-04-22 ENCOUNTER — OFFICE VISIT (OUTPATIENT)
Dept: FAMILY MEDICINE CLINIC | Facility: CLINIC | Age: 46
End: 2021-04-22

## 2021-04-22 VITALS
HEIGHT: 61 IN | BODY MASS INDEX: 43 KG/M2 | DIASTOLIC BLOOD PRESSURE: 80 MMHG | OXYGEN SATURATION: 96 % | TEMPERATURE: 97 F | RESPIRATION RATE: 16 BRPM | SYSTOLIC BLOOD PRESSURE: 120 MMHG | HEART RATE: 104 BPM

## 2021-04-22 DIAGNOSIS — I44.7 LBBB (LEFT BUNDLE BRANCH BLOCK): ICD-10-CM

## 2021-04-22 DIAGNOSIS — I10 ESSENTIAL HYPERTENSION: Primary | ICD-10-CM

## 2021-04-22 PROCEDURE — 3008F BODY MASS INDEX DOCD: CPT | Performed by: FAMILY MEDICINE

## 2021-04-22 PROCEDURE — 3074F SYST BP LT 130 MM HG: CPT | Performed by: FAMILY MEDICINE

## 2021-04-22 PROCEDURE — 3079F DIAST BP 80-89 MM HG: CPT | Performed by: FAMILY MEDICINE

## 2021-04-22 PROCEDURE — 99213 OFFICE O/P EST LOW 20 MIN: CPT | Performed by: FAMILY MEDICINE

## 2021-04-22 NOTE — PROGRESS NOTES
Ezra Gregory IS A 50year old female HERE FOR Patient presents with:  Hypertension: medication f/u.         History of present illness:     With higher blood pressure readings, pt c/o headache, foggy headed, flushing (face turns bright red), weak voice, • External hemorrhoids without mention of complication    • Gastritis     mild   • VTAXWBRO(483.1)    • Helicobacter pylori (H. pylori) 3/2010    H. pylori (+)   • High-density lipoprotein deficiency     \"low HDL\"   • Hypertension    • Other ill-define Other (digestive disorder) Other         gallbladder, grandmother   • Diabetes Other         mother's side, aunts/uncles   • Other (kidney failure) Other         aunt and uncle, d/t DM       Social history:       Social History    Socioeconomic History Services:       Active Member of Clubs or Organizations:       Attends Club or Organization Meetings:       Marital Status:   Intimate Partner Violence:       Fear of Current or Ex-Partner:       Emotionally Abused:       Physically Abused:       Sexually

## 2021-04-22 NOTE — PATIENT INSTRUCTIONS
Talk to Dr. Fariha Goldstein, your flushing and headaches. Ask if any testing for pheochromocytoma, carcinoid, or Cushing's (excess steroids) necessary. Consider amlodipine with other meds.

## 2021-04-23 ENCOUNTER — OFFICE VISIT (OUTPATIENT)
Dept: CARDIOLOGY | Age: 46
End: 2021-04-23

## 2021-04-23 VITALS
HEIGHT: 61 IN | SYSTOLIC BLOOD PRESSURE: 132 MMHG | BODY MASS INDEX: 41.78 KG/M2 | DIASTOLIC BLOOD PRESSURE: 84 MMHG | WEIGHT: 221.3 LBS | HEART RATE: 92 BPM

## 2021-04-23 DIAGNOSIS — Z01.810 PREOP CARDIOVASCULAR EXAM: ICD-10-CM

## 2021-04-23 DIAGNOSIS — I15.9 SECONDARY HYPERTENSION: Primary | ICD-10-CM

## 2021-04-23 DIAGNOSIS — I44.7 LBBB (LEFT BUNDLE BRANCH BLOCK): ICD-10-CM

## 2021-04-23 PROCEDURE — 3075F SYST BP GE 130 - 139MM HG: CPT | Performed by: INTERNAL MEDICINE

## 2021-04-23 PROCEDURE — 99214 OFFICE O/P EST MOD 30 MIN: CPT | Performed by: INTERNAL MEDICINE

## 2021-04-23 PROCEDURE — 3079F DIAST BP 80-89 MM HG: CPT | Performed by: INTERNAL MEDICINE

## 2021-04-23 SDOH — HEALTH STABILITY: PHYSICAL HEALTH: ON AVERAGE, HOW MANY MINUTES DO YOU ENGAGE IN EXERCISE AT THIS LEVEL?: 0 MIN

## 2021-04-23 SDOH — HEALTH STABILITY: PHYSICAL HEALTH: ON AVERAGE, HOW MANY DAYS PER WEEK DO YOU ENGAGE IN MODERATE TO STRENUOUS EXERCISE (LIKE A BRISK WALK)?: 0 DAYS

## 2021-04-23 ASSESSMENT — PATIENT HEALTH QUESTIONNAIRE - PHQ9
CLINICAL INTERPRETATION OF PHQ9 SCORE: NO FURTHER SCREENING NEEDED
SUM OF ALL RESPONSES TO PHQ9 QUESTIONS 1 AND 2: 0
SUM OF ALL RESPONSES TO PHQ9 QUESTIONS 1 AND 2: 0
1. LITTLE INTEREST OR PLEASURE IN DOING THINGS: NOT AT ALL
2. FEELING DOWN, DEPRESSED OR HOPELESS: NOT AT ALL
CLINICAL INTERPRETATION OF PHQ2 SCORE: NO FURTHER SCREENING NEEDED

## 2021-04-27 ENCOUNTER — IMMUNIZATION (OUTPATIENT)
Dept: LAB | Facility: HOSPITAL | Age: 46
End: 2021-04-27
Attending: EMERGENCY MEDICINE
Payer: COMMERCIAL

## 2021-04-27 DIAGNOSIS — Z23 NEED FOR VACCINATION: Primary | ICD-10-CM

## 2021-04-27 PROCEDURE — 0012A SARSCOV2 VAC 100MCG/0.5ML IM: CPT

## 2021-05-14 ENCOUNTER — TELEPHONE (OUTPATIENT)
Dept: CARDIOLOGY | Age: 46
End: 2021-05-14

## 2021-05-14 ENCOUNTER — OFF PREMISE (OUTPATIENT)
Dept: CARDIOLOGY | Age: 46
End: 2021-05-14

## 2021-06-03 ENCOUNTER — TELEPHONE (OUTPATIENT)
Dept: CARDIOLOGY | Age: 46
End: 2021-06-03

## 2021-06-03 RX ORDER — LISINOPRIL 10 MG/1
20 TABLET ORAL NIGHTLY
Qty: 180 TABLET | Refills: 3 | Status: SHIPPED | OUTPATIENT
Start: 2021-06-03

## 2021-06-09 ENCOUNTER — TELEPHONE (OUTPATIENT)
Dept: CARDIOLOGY | Age: 46
End: 2021-06-09

## 2021-06-10 ENCOUNTER — TELEPHONE (OUTPATIENT)
Dept: FAMILY MEDICINE CLINIC | Facility: CLINIC | Age: 46
End: 2021-06-10

## 2021-06-10 NOTE — TELEPHONE ENCOUNTER
Spoke to patient. States she started feeling dizzy about 2 days ago, took Xyzal (which was recommended for her dizziness by her ENT) and the dizziness got worse. Started new BP med in April 2021 with Dr. Rupal Mark, Cardiologist at [address] [place] [address].  Patient spoke

## 2021-06-10 NOTE — TELEPHONE ENCOUNTER
Pt left a vm stating she's been having issues with dizziness over the last few days. Said she is currently in bed today due to it being so bad. She said she called her Cardio dr & he thinks it's related to allergies & the Xyzal she is taking.

## 2021-06-11 NOTE — TELEPHONE ENCOUNTER
Please advise patient to hold xyzal for the next few days and monitor her symptoms, if dizziness in worsening on the weekend to go to ER, follow up with me next week.

## 2021-06-11 NOTE — TELEPHONE ENCOUNTER
Called patient and informed to take plain lisinopril 20 mg BID over weekend, monitor B/P and call Monday with condition update. Agrees to POC.

## 2021-06-11 NOTE — TELEPHONE ENCOUNTER
Called patient to advised of recommendations per Dr. Fernanda Burk. States the dizziness has been debilitating. States the xyzal did not cause the dizziness, she was already dizzy and the ENT suggested trying it to see if it helped. It did not help.   She has

## 2021-06-14 NOTE — TELEPHONE ENCOUNTER
Kacey Jacobsen is 11 month old female who presents for six month well child visit. No chief complaint on file. INTERVAL PROBLEMS: n/a  History reviewed. No pertinent past medical history. No current outpatient medications on file.      DIET: pumped Returned call to patient. States she held the hydrochlorothiazide for two days and only took Lisinopril 20 mg BID. Felt worse, bad pressure HA. Resumed hydrochlorothiazide on 6/13/21. Headache and head pressure resolved, noted increased urination x15. who is here for the six month visit. Is in good general health  DIET: Continue breast or bottle. Should have started rice cereal by now. If not already, can add fruits and vegetables. (Stage one foods).  Introduce one new food every few days to see if aller

## 2021-06-14 NOTE — TELEPHONE ENCOUNTER
Pt called asking to speak to Nurse to update her condition. She did not want to relay message thru .

## 2021-06-16 ENCOUNTER — OFFICE VISIT (OUTPATIENT)
Dept: FAMILY MEDICINE CLINIC | Facility: CLINIC | Age: 46
End: 2021-06-16

## 2021-06-16 VITALS
SYSTOLIC BLOOD PRESSURE: 116 MMHG | BODY MASS INDEX: 43 KG/M2 | HEART RATE: 90 BPM | HEIGHT: 61 IN | RESPIRATION RATE: 18 BRPM | DIASTOLIC BLOOD PRESSURE: 82 MMHG

## 2021-06-16 DIAGNOSIS — L40.9 PSORIASIS: ICD-10-CM

## 2021-06-16 DIAGNOSIS — I10 ESSENTIAL HYPERTENSION: ICD-10-CM

## 2021-06-16 DIAGNOSIS — R42 DIZZINESS: Primary | ICD-10-CM

## 2021-06-16 DIAGNOSIS — R73.03 PREDIABETES: ICD-10-CM

## 2021-06-16 DIAGNOSIS — H92.03 EAR DISCOMFORT, BILATERAL: ICD-10-CM

## 2021-06-16 DIAGNOSIS — R51.9 PRESSURE IN HEAD: ICD-10-CM

## 2021-06-16 PROCEDURE — 3079F DIAST BP 80-89 MM HG: CPT | Performed by: FAMILY MEDICINE

## 2021-06-16 PROCEDURE — 99215 OFFICE O/P EST HI 40 MIN: CPT | Performed by: FAMILY MEDICINE

## 2021-06-16 PROCEDURE — 3008F BODY MASS INDEX DOCD: CPT | Performed by: FAMILY MEDICINE

## 2021-06-16 PROCEDURE — 3074F SYST BP LT 130 MM HG: CPT | Performed by: FAMILY MEDICINE

## 2021-06-16 RX ORDER — LISINOPRIL 10 MG/1
20 TABLET ORAL NIGHTLY
COMMUNITY
Start: 2021-06-03 | End: 2021-06-16

## 2021-06-16 RX ORDER — MECLIZINE HCL 12.5 MG/1
TABLET ORAL
Qty: 20 TABLET | Refills: 0 | Status: SHIPPED | OUTPATIENT
Start: 2021-06-16 | End: 2021-07-30

## 2021-06-16 RX ORDER — FLUTICASONE PROPIONATE 50 MCG
2 SPRAY, SUSPENSION (ML) NASAL DAILY
Qty: 16 G | Refills: 1 | Status: SHIPPED | OUTPATIENT
Start: 2021-06-16 | End: 2021-09-10 | Stop reason: DRUGHIGH

## 2021-06-16 NOTE — PROGRESS NOTES
Rina Gilman is a 48year old female. HPI:  Pt having intermittent dizziness since 2/2021  H/o HTN and meds adjusted and this did not help. Has seen Cardiologist and does not believe sxs related to her HTN.   Will feel first ear pressure bilat and then Refill   • Meclizine HCl 12.5 MG Oral Tab 1 po bid/prn as directed 20 tablet 0   • Fluticasone Propionate 50 MCG/ACT Nasal Suspension 2 sprays by Each Nare route daily.  16 g 1   • Lisinopril-hydroCHLOROthiazide 20-25 MG Oral Tab Take 1 tablet by mouth aura HEALTH: feels well otherwise, mood is good  SKIN: Psoriasis as above  RESPIRATORY: denies shortness of breath with exertion, no cough  CARDIOVASCULAR: denies chest pain on exertion  GI: denies abdominal pain  : normal bladder  NEURO: As above    EXAM:  B see if offers some relief of sxs and pt to try. Will try hs dosing first to make sure tolerates med and if so, will try bid x 5 days then bid/prn, and monitor. warned of poss sedation.   rec Flonase 2 spray each nostril daily to see if this helps with ear sx

## 2021-06-17 RX ORDER — FLUTICASONE PROPIONATE 50 MCG
SPRAY, SUSPENSION (ML) NASAL
Qty: 48 G | Refills: 0 | OUTPATIENT
Start: 2021-06-17

## 2021-07-16 ENCOUNTER — TELEPHONE (OUTPATIENT)
Dept: FAMILY MEDICINE CLINIC | Facility: CLINIC | Age: 46
End: 2021-07-16

## 2021-07-16 NOTE — TELEPHONE ENCOUNTER
Pt calling for an appt, said that she had to reschedule appt with neurologist. Still experiencing dizziness and anytime she leaves the house she gets sick and has a hard time swallowing. Said immune system is so weak.  About 2 weeks ago vitiligo appeared on

## 2021-07-19 NOTE — TELEPHONE ENCOUNTER
Called patient who states her dizziness is slightly better but has not been able to follow up with Neurology yes. Was sick for her 7/9/21 appt and had to reschedule for August.  States has had both covid vaccines but is still getting URI symptoms.   Advise

## 2021-07-30 ENCOUNTER — OFFICE VISIT (OUTPATIENT)
Dept: FAMILY MEDICINE CLINIC | Facility: CLINIC | Age: 46
End: 2021-07-30

## 2021-07-30 VITALS
BODY MASS INDEX: 43 KG/M2 | RESPIRATION RATE: 16 BRPM | HEIGHT: 61 IN | OXYGEN SATURATION: 98 % | HEART RATE: 92 BPM | SYSTOLIC BLOOD PRESSURE: 110 MMHG | TEMPERATURE: 99 F | DIASTOLIC BLOOD PRESSURE: 82 MMHG

## 2021-07-30 DIAGNOSIS — H10.31 ACUTE CONJUNCTIVITIS OF RIGHT EYE, UNSPECIFIED ACUTE CONJUNCTIVITIS TYPE: ICD-10-CM

## 2021-07-30 DIAGNOSIS — I10 ESSENTIAL HYPERTENSION: ICD-10-CM

## 2021-07-30 DIAGNOSIS — L81.9 SKIN DEPIGMENTATION: ICD-10-CM

## 2021-07-30 DIAGNOSIS — L40.9 PSORIASIS: ICD-10-CM

## 2021-07-30 DIAGNOSIS — Z02.9 ADMINISTRATIVE ENCOUNTER: ICD-10-CM

## 2021-07-30 DIAGNOSIS — R51.9 PRESSURE IN HEAD: ICD-10-CM

## 2021-07-30 DIAGNOSIS — R42 DIZZINESS: Primary | ICD-10-CM

## 2021-07-30 PROCEDURE — 3074F SYST BP LT 130 MM HG: CPT | Performed by: FAMILY MEDICINE

## 2021-07-30 PROCEDURE — 99214 OFFICE O/P EST MOD 30 MIN: CPT | Performed by: FAMILY MEDICINE

## 2021-07-30 PROCEDURE — 3079F DIAST BP 80-89 MM HG: CPT | Performed by: FAMILY MEDICINE

## 2021-07-30 PROCEDURE — 3008F BODY MASS INDEX DOCD: CPT | Performed by: FAMILY MEDICINE

## 2021-07-30 RX ORDER — TOBRAMYCIN 3 MG/ML
2 SOLUTION/ DROPS OPHTHALMIC EVERY 6 HOURS
Qty: 5 ML | Refills: 0 | Status: SHIPPED | OUTPATIENT
Start: 2021-07-30 | End: 2021-08-05

## 2021-07-30 NOTE — PATIENT INSTRUCTIONS
Decrease Lisinopril to 10mg in PM    Monitor home blood pressures and if notes elevations greater than 140/90, should resume lisinopril 20 mg in p.m.

## 2021-07-30 NOTE — PROGRESS NOTES
Clau Coley is a 48year old female. HPI:  Pt states she continues to have intermittent dizziness and just not feeling good. Having/lightheadedness dizziness for a few months.  States like a [employer] [place] [employer] fog\"  Did not take meclizine after last office visi better.    No heartburn  No n/v  Normal BMs        Current Outpatient Medications   Medication Sig Dispense Refill   • triamcinolone acetonide 0.1 % External Cream      • Tobramycin Sulfate (TOBREX) 0.3 % Ophthalmic Solution Place 2 drops into the right eye W/REMOVE TUBE(S) &/OR OVARY(S)  08/13/2019    saranya uLna, for large and growing fibroid that proved benign         Social History    Tobacco Use      Smoking status: Never Smoker      Smokeless tobacco: Never Used    Vaping Use      Vaping Use: Never and fall precautions. 3. Essential hypertension  Some low readings at times  Can try to lower med dosing and see if helps with dizziness. Cont Lisinopril /hydrochlorothiazide to once daily  Decrease lisinopril to 10 mg in p.m. Monitor readings.   If no

## 2021-08-02 ENCOUNTER — TELEPHONE (OUTPATIENT)
Dept: FAMILY MEDICINE CLINIC | Facility: CLINIC | Age: 46
End: 2021-08-02

## 2021-08-02 NOTE — TELEPHONE ENCOUNTER
Work note written by Dr. Brown Knapp after OV 7/30/21 faxed to Ann Sales at [address], [phone], as requested. Confirmation fax received.

## 2021-08-17 ENCOUNTER — TELEPHONE (OUTPATIENT)
Dept: FAMILY MEDICINE CLINIC | Facility: CLINIC | Age: 46
End: 2021-08-17

## 2021-08-17 NOTE — TELEPHONE ENCOUNTER
Pt called stating at a last visit Dr Davis Lisbon her only 30 days of the \"Lisinopril\"  (pt said it was filled on 7-18-21). Pt wants 90 days going forward not 30 days.   Needs refill

## 2021-08-17 NOTE — TELEPHONE ENCOUNTER
Dr. Kenrick Allen pt  LOV: 7/30/21 with Dr. Reyes [facility]    Last refill:  lisinopril 10 MG Oral Tab 30 tablet 3 4/15/2021    Sig:   Take 1 tablet (10 mg total) by mouth daily as needed (elevated blood pressure in afternoon).        Last labs: 4/21/21  Future appt: None    Ca

## 2021-08-18 RX ORDER — LISINOPRIL AND HYDROCHLOROTHIAZIDE 25; 20 MG/1; MG/1
1 TABLET ORAL EVERY MORNING
Qty: 90 TABLET | Refills: 0 | Status: SHIPPED | OUTPATIENT
Start: 2021-08-18 | End: 2021-11-13

## 2021-08-18 RX ORDER — LISINOPRIL 10 MG/1
20 TABLET ORAL EVERY EVENING
Qty: 180 TABLET | Refills: 0 | Status: SHIPPED | OUTPATIENT
Start: 2021-08-18 | End: 2021-11-17

## 2021-09-07 ENCOUNTER — TELEPHONE (OUTPATIENT)
Dept: NEUROLOGY | Facility: CLINIC | Age: 46
End: 2021-09-07

## 2021-09-07 NOTE — TELEPHONE ENCOUNTER
Relayed below to pt. No sooner appts. She agreed to take appt with alt provider since she already needed a 40 min appt. Pt accepted appt with Dr Jose Alberto Valentino on 9/10 at 10 am. Pt requests 10/1 appt with OSCAR be left until she sees KB/.

## 2021-09-07 NOTE — TELEPHONE ENCOUNTER
Thanks for the update, I would recommend you follow ophthalmologist's recommendation, for any new life threatening symptoms, please consider ER visit.     Just FYI; since last visit was > 3 years ago, she is no longer considered as an established pt in our

## 2021-09-07 NOTE — TELEPHONE ENCOUNTER
pt states she was at opthalmologist recently due to loss of vision in left eye; opthalmologist would like her seen asap; pt has earliest appt 10/1/21; pls advise

## 2021-09-10 ENCOUNTER — OFFICE VISIT (OUTPATIENT)
Dept: NEUROLOGY | Facility: CLINIC | Age: 46
End: 2021-09-10

## 2021-09-10 VITALS
SYSTOLIC BLOOD PRESSURE: 130 MMHG | HEIGHT: 61 IN | DIASTOLIC BLOOD PRESSURE: 71 MMHG | BODY MASS INDEX: 43 KG/M2 | HEART RATE: 87 BPM | RESPIRATION RATE: 16 BRPM

## 2021-09-10 DIAGNOSIS — R29.818 TRANSIENT NEUROLOGIC DEFICIT: ICD-10-CM

## 2021-09-10 DIAGNOSIS — R68.89 SPELLS OF DECREASED ATTENTIVENESS: ICD-10-CM

## 2021-09-10 DIAGNOSIS — I10 PRIMARY HYPERTENSION: ICD-10-CM

## 2021-09-10 DIAGNOSIS — L40.9 PSORIASIS: ICD-10-CM

## 2021-09-10 DIAGNOSIS — H54.62 VISION LOSS, LEFT EYE: Primary | ICD-10-CM

## 2021-09-10 PROCEDURE — 3008F BODY MASS INDEX DOCD: CPT | Performed by: OTHER

## 2021-09-10 PROCEDURE — 3075F SYST BP GE 130 - 139MM HG: CPT | Performed by: OTHER

## 2021-09-10 PROCEDURE — 99215 OFFICE O/P EST HI 40 MIN: CPT | Performed by: OTHER

## 2021-09-10 PROCEDURE — 3078F DIAST BP <80 MM HG: CPT | Performed by: OTHER

## 2021-09-10 NOTE — PROGRESS NOTES
[facility] Progress Note    HPI  Patient presents with:  Neurologic Problem: C/O of diziness,hearing loss and vision loss on left eye      Radha Casareznethy is a 48year old female with PMHx significant for psoriasis, who previously has b with cardiology and states her blood pressure is improved now - she was having reaction and poor response to irbesartan in the past; she is on lisinopril 20 mg bid and hydrochlorothiazide 25 mg daily.      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     Gen: well developed, well nourished, no acute distress  HEENT: normocephalic  Heart; normal O8/U8, regular rate and rhythm  Lungs: clear to auscultation bilaterally  Extremities: no edema, peripheral pulses intact    Neck: supple, full range Hematocrit      35.0 - 48.0 % 45.3   Platelet Count      052.1 - 450.0 10(3)uL 338.0   MCV      80.0 - 100.0 fL 85.3   MCH      26.0 - 34.0 pg 27.1   MCHC      31.0 - 37.0 g/dL 31.8   RDW      11.0 - 15.0 % 14.7   RDW-SD      35.1 - 46.3 fL 46.0   Prelim non-focal with no evidence for relative afferent pupillary defect and no objective visual loss at this time.   However, given recurrent left eye vision loss which is transient as well as patient's history of poor controlled HTN, ischemic events, such as TIA Neurology  [place] [facility]  Pager [phone]  9/10/2021

## 2021-09-10 NOTE — PATIENT INSTRUCTIONS
Please start taking ASA 81 mg daily   Have EEG done  Have MRI and carotid ultrasound    Refill policies:    • Allow 2-3 business days for refills; controlled substances may take longer.   • Contact your pharmacy at least 5 days prior to running out of medic until this office has notified you that the test has been approved by your insurer. Depending on your insurance carrier, approval may take 3-10 days. It is highly recommended patients contact their insurance carrier directly to determine coverage.   If faye

## 2021-09-22 ENCOUNTER — OFFICE VISIT (OUTPATIENT)
Dept: CARDIOLOGY | Age: 46
End: 2021-09-22

## 2021-09-22 ENCOUNTER — TELEPHONE (OUTPATIENT)
Dept: CARDIOLOGY | Age: 46
End: 2021-09-22

## 2021-09-22 VITALS
DIASTOLIC BLOOD PRESSURE: 80 MMHG | BODY MASS INDEX: 42.32 KG/M2 | WEIGHT: 224 LBS | HEART RATE: 70 BPM | SYSTOLIC BLOOD PRESSURE: 130 MMHG

## 2021-09-22 DIAGNOSIS — I44.7 LBBB (LEFT BUNDLE BRANCH BLOCK): ICD-10-CM

## 2021-09-22 DIAGNOSIS — Z01.810 PREOP CARDIOVASCULAR EXAM: Primary | ICD-10-CM

## 2021-09-22 DIAGNOSIS — R07.9 CHEST PAIN, UNSPECIFIED TYPE: ICD-10-CM

## 2021-09-22 DIAGNOSIS — I15.9 SECONDARY HYPERTENSION: ICD-10-CM

## 2021-09-22 PROCEDURE — 99214 OFFICE O/P EST MOD 30 MIN: CPT | Performed by: INTERNAL MEDICINE

## 2021-09-22 PROCEDURE — 3079F DIAST BP 80-89 MM HG: CPT | Performed by: INTERNAL MEDICINE

## 2021-09-22 PROCEDURE — 3075F SYST BP GE 130 - 139MM HG: CPT | Performed by: INTERNAL MEDICINE

## 2021-09-22 RX ORDER — METOPROLOL TARTRATE 50 MG/1
25 TABLET, FILM COATED ORAL PRN
Qty: 2 TABLET | Refills: 0 | Status: SHIPPED | OUTPATIENT
Start: 2021-09-22

## 2021-09-23 ENCOUNTER — TELEPHONE (OUTPATIENT)
Dept: CT IMAGING | Age: 46
End: 2021-09-23

## 2021-09-29 ENCOUNTER — TELEPHONE (OUTPATIENT)
Dept: FAMILY MEDICINE CLINIC | Facility: CLINIC | Age: 46
End: 2021-09-29

## 2021-09-29 DIAGNOSIS — J06.9 VIRAL UPPER RESPIRATORY TRACT INFECTION: ICD-10-CM

## 2021-09-29 DIAGNOSIS — Z20.822 ENCOUNTER FOR SCREENING LABORATORY TESTING FOR COVID-19 VIRUS: Primary | ICD-10-CM

## 2021-09-29 NOTE — TELEPHONE ENCOUNTER
Order signed. Schedule video visit if symptoms persist.  To urgent care or ER if chest pain or shortness of breath.

## 2021-09-29 NOTE — TELEPHONE ENCOUNTER
Patient called stating developed URI symptoms Sunday-Monday. Initially had chest congestion, nasal congestion, some SOB and hoarseness. Has been home except for grocery store and doctor's appt. Works from home.   States symptoms better today but she is co

## 2021-09-29 NOTE — TELEPHONE ENCOUNTER
Pt calling stating that over the last 3 days shes been having some sick symptoms and some issues with her breathing. She said she is feeling much better today, but is concerned she may have covid again. Looking for advise from nurse. Please advise.

## 2021-09-30 ENCOUNTER — MED REC SCAN ONLY (OUTPATIENT)
Dept: FAMILY MEDICINE CLINIC | Facility: CLINIC | Age: 46
End: 2021-09-30

## 2021-09-30 NOTE — TELEPHONE ENCOUNTER
ML advising order has been placed for covid test as requested. Number to [facility] given to make covid test appt. Advised if symptoms persist, call for Video Visit. If she develops and SOB, CP go to IC/ED for evaluation.

## 2021-10-06 NOTE — TELEPHONE ENCOUNTER
2nd attempt to contact. Lm for pt (Emily Cable per HIPAA) to inform f/u as we noted pt had not completed covid test as ordered per pt request. May call CS# to schedule. Inquiring if any improvements or worsening of sx?  If symptoms persist, will need f/u video visi

## 2021-10-07 NOTE — TELEPHONE ENCOUNTER
Triage call transferred. Spoke with pt indicating feeling much better, and does not want complete further covid testing. Pt requesting mammogram order as has not completed mammo since 2017. Pt also mention experiencing breast pain.  Informed would need

## 2021-10-20 ENCOUNTER — LAB ENCOUNTER (OUTPATIENT)
Dept: LAB | Age: 46
End: 2021-10-20
Attending: FAMILY MEDICINE
Payer: COMMERCIAL

## 2021-10-20 DIAGNOSIS — J06.9 VIRAL UPPER RESPIRATORY TRACT INFECTION: ICD-10-CM

## 2021-10-20 DIAGNOSIS — Z20.822 ENCOUNTER FOR SCREENING LABORATORY TESTING FOR COVID-19 VIRUS: ICD-10-CM

## 2021-10-26 ENCOUNTER — TELEPHONE (OUTPATIENT)
Dept: FAMILY MEDICINE CLINIC | Facility: CLINIC | Age: 46
End: 2021-10-26

## 2021-10-26 NOTE — TELEPHONE ENCOUNTER
Pt is asking for appt w/ Dr.Manubolu matthew, said appt is to get an order for mammogram due to some pain in the chest area she has been having (see TE dated 9/29/21) offered 1st available appt with  1/7/22, pt said  told her she needed to

## 2021-10-26 NOTE — TELEPHONE ENCOUNTER
Pt notified she can see another physician in our office or establish care w/ Erica Medina, pt wanted to see , scheduled 1/7/22

## 2021-10-26 NOTE — TELEPHONE ENCOUNTER
Called patient to clarify what pain she is having and what type of appt is needed. States she is continuing to be SOB and the pain she is having seems to be in the left chest wall \"over my heart\". Denies radiation of pain to shoulder, jaw or left arm.

## 2021-11-03 ENCOUNTER — TELEPHONE (OUTPATIENT)
Dept: CARDIOLOGY | Age: 46
End: 2021-11-03

## 2021-11-03 ENCOUNTER — TELEPHONE (OUTPATIENT)
Dept: FAMILY MEDICINE CLINIC | Facility: CLINIC | Age: 46
End: 2021-11-03

## 2021-11-03 NOTE — TELEPHONE ENCOUNTER
Patient stated that her cardiologist was ordering tests along with labs. Patient's ins told her that tests and labs would have to be ordered through her PCP. Pt was a Dr. Nikhil Jensen. Patient.     Fax received from Dr. David Valdovinos (cardiologist) office listing test

## 2021-11-03 NOTE — TELEPHONE ENCOUNTER
LOV 07-30-21. Former [facility] patient. Has appt scheduled with you on 01-07-22. Please advise if orders can be placed.

## 2021-11-04 NOTE — TELEPHONE ENCOUNTER
Triage call transferred. Front indicated pt very rude, indicating will not get off phone until transferred to RN. Spoke with pt stating would like call back when tests have been entered. Provided pt with CS#. Dr. Sanjeev Cunningham- did you receive these?   Please advis

## 2021-11-08 ENCOUNTER — APPOINTMENT (OUTPATIENT)
Dept: CT IMAGING | Facility: HOSPITAL | Age: 46
End: 2021-11-08
Attending: EMERGENCY MEDICINE
Payer: COMMERCIAL

## 2021-11-08 ENCOUNTER — HOSPITAL ENCOUNTER (EMERGENCY)
Facility: HOSPITAL | Age: 46
Discharge: HOME | End: 2021-11-08
Attending: EMERGENCY MEDICINE
Payer: COMMERCIAL

## 2021-11-08 ENCOUNTER — APPOINTMENT (OUTPATIENT)
Dept: GENERAL RADIOLOGY | Facility: HOSPITAL | Age: 46
End: 2021-11-08
Attending: EMERGENCY MEDICINE
Payer: COMMERCIAL

## 2021-11-08 VITALS
RESPIRATION RATE: 20 BRPM | BODY MASS INDEX: 41.91 KG/M2 | SYSTOLIC BLOOD PRESSURE: 162 MMHG | HEART RATE: 86 BPM | TEMPERATURE: 98 F | DIASTOLIC BLOOD PRESSURE: 77 MMHG | HEIGHT: 61 IN | WEIGHT: 222 LBS | OXYGEN SATURATION: 98 %

## 2021-11-08 DIAGNOSIS — R07.89 CHEST PAIN, ATYPICAL: Primary | ICD-10-CM

## 2021-11-08 PROCEDURE — 99284 EMERGENCY DEPT VISIT MOD MDM: CPT

## 2021-11-08 PROCEDURE — 85379 FIBRIN DEGRADATION QUANT: CPT | Performed by: EMERGENCY MEDICINE

## 2021-11-08 PROCEDURE — 36415 COLL VENOUS BLD VENIPUNCTURE: CPT

## 2021-11-08 PROCEDURE — 71275 CT ANGIOGRAPHY CHEST: CPT | Performed by: EMERGENCY MEDICINE

## 2021-11-08 PROCEDURE — 93005 ELECTROCARDIOGRAM TRACING: CPT

## 2021-11-08 PROCEDURE — 93010 ELECTROCARDIOGRAM REPORT: CPT

## 2021-11-08 PROCEDURE — 84484 ASSAY OF TROPONIN QUANT: CPT | Performed by: EMERGENCY MEDICINE

## 2021-11-08 PROCEDURE — 85025 COMPLETE CBC W/AUTO DIFF WBC: CPT | Performed by: EMERGENCY MEDICINE

## 2021-11-08 PROCEDURE — 71045 X-RAY EXAM CHEST 1 VIEW: CPT | Performed by: EMERGENCY MEDICINE

## 2021-11-08 PROCEDURE — 80053 COMPREHEN METABOLIC PANEL: CPT | Performed by: EMERGENCY MEDICINE

## 2021-11-08 RX ORDER — KETOROLAC TROMETHAMINE 30 MG/ML
15 INJECTION, SOLUTION INTRAMUSCULAR; INTRAVENOUS ONCE
Status: DISCONTINUED | OUTPATIENT
Start: 2021-11-08 | End: 2021-11-08

## 2021-11-08 RX ORDER — ASPIRIN 81 MG/1
324 TABLET, CHEWABLE ORAL ONCE
Status: COMPLETED | OUTPATIENT
Start: 2021-11-08 | End: 2021-11-08

## 2021-11-08 NOTE — ED INITIAL ASSESSMENT (HPI)
PT PRESENTS TO ED WITH CHEST PAIN AND SHORTNESS OF BREATH THAT STARTED 2 WEEKS AGO, PT STATES HER CARDIOLOGIST TOLD HER HE WANTED TO SCHEDULE AN OUTPATIENT CT, WAS UNABLE TO DUE TO INSURANCE. PT STATES SHE HAS SHORTNESS OF BREATH WHEN SHE WALKS UP STAIRS.

## 2021-11-08 NOTE — TELEPHONE ENCOUNTER
Not sure why I need to reorder the labs if she is seeing a cardiologist in the [facility] network and he already ordered the labs.

## 2021-11-08 NOTE — TELEPHONE ENCOUNTER
VMML for Maryuri Rojas at [facility] UR to see why labs ordered by Cardiology need to be ordered by PCP. Referral to Dr. Jude Bae, cardiology was authorized by [facility]. Triage phone number given and requested return call.

## 2021-11-09 ENCOUNTER — TELEPHONE (OUTPATIENT)
Dept: FAMILY MEDICINE CLINIC | Facility: CLINIC | Age: 46
End: 2021-11-09

## 2021-11-09 NOTE — ED PROVIDER NOTES
Patient Seen in: [facility] Emergency Department      History   Patient presents with:  Chest Pain Angina  Difficulty Breathing    Stated Complaint:     Subjective:   HPI    Had Covid during the first with about a year and a half ago.   Since then fe Laparoscopy   • LAPAROSCOPY, SURG, W/VAGINAL HYSTERECTOMY, UTERUS >250GMS; W/REMOVE TUBE(S) &/OR OVARY(S)  08/13/2019    saranya Luna, for large and growing fibroid that proved benign                Social History    Tobacco Use      Smoking status: [employer] the following components:       Result Value    Glucose 119 (*)     Albumin 3.1 (*)     A/G Ratio 0.7 (*)     All other components within normal limits   D-DIMER - Abnormal; Notable for the following components:    D-Dimer 0.55 (*)     All other components anatomy. 3D volume renderings are generated. Dose reduction techniques were used. Dose information is transmitted to the [facility] [facility] [employer] [facility] [employer]) NRDR ([address]) which includes the Dose Index Registry.   PATIENT STATED HI Mediastinum and roman are otherwise unremarkable. CONCLUSION:  There is no evidence of active cardiopulmonary disease on this single portable chest radiograph.     Dictated by (CST): Emanuel Dawson MD on 11/08/2021 at 5:31 PM     Finalized by (CST)

## 2021-11-09 NOTE — TELEPHONE ENCOUNTER
Received return call from [employer] at [facility]. States patient cardiologist, Dr. Sandie Martinez, is no longer in patient's insurance network. When the [facility] group split, Dr. Sandie Martinez did not sign contract with [facility].  States any ordered labs will

## 2021-11-12 ENCOUNTER — APPOINTMENT (OUTPATIENT)
Dept: CT IMAGING | Age: 46
End: 2021-11-12
Attending: INTERNAL MEDICINE

## 2021-11-13 ENCOUNTER — PATIENT MESSAGE (OUTPATIENT)
Dept: FAMILY MEDICINE CLINIC | Facility: CLINIC | Age: 46
End: 2021-11-13

## 2021-11-13 DIAGNOSIS — I10 PRIMARY HYPERTENSION: Primary | ICD-10-CM

## 2021-11-13 RX ORDER — LISINOPRIL AND HYDROCHLOROTHIAZIDE 25; 20 MG/1; MG/1
1 TABLET ORAL EVERY MORNING
Qty: 90 TABLET | Refills: 0 | Status: SHIPPED | OUTPATIENT
Start: 2021-11-13 | End: 2022-02-03

## 2021-11-13 NOTE — TELEPHONE ENCOUNTER
LOV 7/30/2021    LAST LAB 11/8/2021    LAST RX  Lisinopril-hydroCHLOROthiazide 20-25 MG Oral Tab 90 tablet 0 8/18/2021           Next OV   Future Appointments   Date Time Provider Fe Patel   1/7/2022  1:20 PM Megan Roger MD [facility]

## 2021-11-15 NOTE — TELEPHONE ENCOUNTER
LOV 07-30-21. Next appt 01-07-22. Lisinopril/hctz already filled on 11-13-21.  Pended refill of plain Lisinopril for your approval.

## 2021-11-15 NOTE — TELEPHONE ENCOUNTER
From: Ezra Gregory  To: Tasha Rock MD  Sent: 11/13/2021 6:53 AM CST  Subject: Blood pressure    Devon, Dr. Michael Coffey. As of recent, Dr. Tasha Velazquez has retired, and I just learned my cardiologist is no longer in my network from Brianne Ellis, the nurse.  I don’

## 2021-11-17 RX ORDER — LISINOPRIL 10 MG/1
20 TABLET ORAL EVERY EVENING
Qty: 180 TABLET | Refills: 0 | Status: SHIPPED | OUTPATIENT
Start: 2021-11-17

## 2021-11-17 NOTE — TELEPHONE ENCOUNTER
Per PM 11/13/21, Cardiologist no longer in pt's network. Noted pt seen in ER on 11/8/21 at which time ordered labs for further work up completed.

## 2021-11-23 ENCOUNTER — OFFICE VISIT (OUTPATIENT)
Dept: FAMILY MEDICINE CLINIC | Facility: CLINIC | Age: 46
End: 2021-11-23
Payer: COMMERCIAL

## 2021-11-23 ENCOUNTER — LAB ENCOUNTER (OUTPATIENT)
Dept: LAB | Age: 46
End: 2021-11-23
Attending: FAMILY MEDICINE
Payer: COMMERCIAL

## 2021-11-23 VITALS
OXYGEN SATURATION: 98 % | RESPIRATION RATE: 18 BRPM | TEMPERATURE: 97 F | SYSTOLIC BLOOD PRESSURE: 110 MMHG | BODY MASS INDEX: 42.29 KG/M2 | HEIGHT: 61 IN | HEART RATE: 90 BPM | WEIGHT: 224 LBS | DIASTOLIC BLOOD PRESSURE: 82 MMHG

## 2021-11-23 DIAGNOSIS — R07.9 CHEST PAIN, UNSPECIFIED TYPE: Primary | ICD-10-CM

## 2021-11-23 DIAGNOSIS — R06.02 SHORTNESS OF BREATH: ICD-10-CM

## 2021-11-23 PROCEDURE — 86003 ALLG SPEC IGE CRUDE XTRC EA: CPT

## 2021-11-23 PROCEDURE — 99213 OFFICE O/P EST LOW 20 MIN: CPT | Performed by: FAMILY MEDICINE

## 2021-11-23 PROCEDURE — 82785 ASSAY OF IGE: CPT

## 2021-11-23 PROCEDURE — 3008F BODY MASS INDEX DOCD: CPT | Performed by: FAMILY MEDICINE

## 2021-11-23 PROCEDURE — 3074F SYST BP LT 130 MM HG: CPT | Performed by: FAMILY MEDICINE

## 2021-11-23 PROCEDURE — 36415 COLL VENOUS BLD VENIPUNCTURE: CPT

## 2021-11-23 PROCEDURE — 3079F DIAST BP 80-89 MM HG: CPT | Performed by: FAMILY MEDICINE

## 2021-11-24 NOTE — PROGRESS NOTES
Nghia Forte is a 48year old female. Patient presents with:   Allergies    HPI:   Nghia Forte is a 48year old female with history of hypertension complaining that she has had frequent left sided chest pain and shortness of breath, did follow up wi hypertension     also hx essential HTN, benign   • Unspecified pruritic disorder    • Vertigo       Social History:  Social History    Tobacco Use      Smoking status: Never Smoker      Smokeless tobacco: Never Used    Vaping Use      Vaping Use: Never use

## 2021-12-13 ENCOUNTER — MED REC SCAN ONLY (OUTPATIENT)
Dept: FAMILY MEDICINE CLINIC | Facility: CLINIC | Age: 46
End: 2021-12-13

## 2021-12-13 ENCOUNTER — OFFICE VISIT (OUTPATIENT)
Dept: FAMILY MEDICINE CLINIC | Facility: CLINIC | Age: 46
End: 2021-12-13
Payer: COMMERCIAL

## 2021-12-13 VITALS
RESPIRATION RATE: 18 BRPM | TEMPERATURE: 97 F | BODY MASS INDEX: 42 KG/M2 | HEART RATE: 97 BPM | SYSTOLIC BLOOD PRESSURE: 122 MMHG | DIASTOLIC BLOOD PRESSURE: 74 MMHG | OXYGEN SATURATION: 99 % | HEIGHT: 61 IN

## 2021-12-13 DIAGNOSIS — R07.89 CHEST DISCOMFORT: ICD-10-CM

## 2021-12-13 DIAGNOSIS — R42 DIZZINESS: ICD-10-CM

## 2021-12-13 DIAGNOSIS — Z02.89 ENCOUNTER FOR COMPLETION OF FORM WITH PATIENT: ICD-10-CM

## 2021-12-13 DIAGNOSIS — R06.02 SHORTNESS OF BREATH: Primary | ICD-10-CM

## 2021-12-13 PROCEDURE — 3078F DIAST BP <80 MM HG: CPT | Performed by: FAMILY MEDICINE

## 2021-12-13 PROCEDURE — 3074F SYST BP LT 130 MM HG: CPT | Performed by: FAMILY MEDICINE

## 2021-12-13 PROCEDURE — 99212 OFFICE O/P EST SF 10 MIN: CPT | Performed by: FAMILY MEDICINE

## 2021-12-13 PROCEDURE — 3008F BODY MASS INDEX DOCD: CPT | Performed by: FAMILY MEDICINE

## 2021-12-14 NOTE — PROGRESS NOTES
Nancy Marin is a 48year old female.   Patient presents with:  Complete Form    HPI:   Nancy Marin is a 48year old female with history of hypertension has new onset of shortness of breath dizziness and chest discomfort, patient states symptoms star essential HTN, benign   • Unspecified pruritic disorder    • Vertigo       Social History:  Social History    Tobacco Use      Smoking status: Never Smoker      Smokeless tobacco: Never Used    Vaping Use      Vaping Use: Never used    Alcohol use: No    D

## 2022-01-20 ENCOUNTER — TELEPHONE (OUTPATIENT)
Dept: CARDIOLOGY | Age: 47
End: 2022-01-20

## 2022-02-03 RX ORDER — LISINOPRIL AND HYDROCHLOROTHIAZIDE 25; 20 MG/1; MG/1
1 TABLET ORAL EVERY MORNING
Qty: 90 TABLET | Refills: 0 | OUTPATIENT
Start: 2022-02-03

## 2022-02-03 RX ORDER — LISINOPRIL AND HYDROCHLOROTHIAZIDE 25; 20 MG/1; MG/1
1 TABLET ORAL EVERY MORNING
Qty: 30 TABLET | Refills: 1 | Status: SHIPPED | OUTPATIENT
Start: 2022-02-03 | End: 2022-02-07

## 2022-02-07 RX ORDER — LISINOPRIL AND HYDROCHLOROTHIAZIDE 25; 20 MG/1; MG/1
1 TABLET ORAL EVERY MORNING
Qty: 30 TABLET | Refills: 1 | Status: SHIPPED | OUTPATIENT
Start: 2022-02-07 | End: 2022-03-28

## 2022-02-09 ENCOUNTER — PATIENT MESSAGE (OUTPATIENT)
Dept: FAMILY MEDICINE CLINIC | Facility: CLINIC | Age: 47
End: 2022-02-09

## 2022-02-09 RX ORDER — LISINOPRIL AND HYDROCHLOROTHIAZIDE 25; 20 MG/1; MG/1
1 TABLET ORAL EVERY MORNING
Qty: 90 TABLET | Refills: 0 | OUTPATIENT
Start: 2022-02-09

## 2022-02-10 NOTE — TELEPHONE ENCOUNTER
From: Stephen Gregory  To: Felicia Calderón MD  Sent: 2/9/2022 7:02 PM CST  Subject: Medication    Devon, Dr. Tanner Varma. I have an appt. with you in March. I called last week to get my blood pressure medicine refilled to last until our appt. I keep getting notices that the request is getting denied. I do not have enough of the 20/25 Lisinopril to take me until our appt. I just picked up a 30 pill amount, and it is not enough. I expressed this to your nurse. I do have enough of the 10mg pills. That was correctly refilled. Thank you.

## 2022-03-28 ENCOUNTER — OFFICE VISIT (OUTPATIENT)
Dept: FAMILY MEDICINE CLINIC | Facility: CLINIC | Age: 47
End: 2022-03-28
Payer: COMMERCIAL

## 2022-03-28 VITALS
OXYGEN SATURATION: 98 % | RESPIRATION RATE: 18 BRPM | BODY MASS INDEX: 42.1 KG/M2 | DIASTOLIC BLOOD PRESSURE: 78 MMHG | HEIGHT: 61 IN | TEMPERATURE: 97 F | WEIGHT: 223 LBS | HEART RATE: 98 BPM | SYSTOLIC BLOOD PRESSURE: 118 MMHG

## 2022-03-28 DIAGNOSIS — L40.9 PSORIASIS: ICD-10-CM

## 2022-03-28 DIAGNOSIS — E66.01 MORBID OBESITY WITH BMI OF 40.0-44.9, ADULT (HCC): ICD-10-CM

## 2022-03-28 DIAGNOSIS — Z12.31 ENCOUNTER FOR SCREENING MAMMOGRAM FOR MALIGNANT NEOPLASM OF BREAST: ICD-10-CM

## 2022-03-28 DIAGNOSIS — Z12.11 SCREENING FOR COLON CANCER: ICD-10-CM

## 2022-03-28 DIAGNOSIS — R06.02 SHORTNESS OF BREATH: ICD-10-CM

## 2022-03-28 DIAGNOSIS — I10 PRIMARY HYPERTENSION: ICD-10-CM

## 2022-03-28 DIAGNOSIS — R07.9 CHEST PAIN, UNSPECIFIED TYPE: Primary | ICD-10-CM

## 2022-03-28 PROCEDURE — 3074F SYST BP LT 130 MM HG: CPT | Performed by: FAMILY MEDICINE

## 2022-03-28 PROCEDURE — 3078F DIAST BP <80 MM HG: CPT | Performed by: FAMILY MEDICINE

## 2022-03-28 PROCEDURE — 99214 OFFICE O/P EST MOD 30 MIN: CPT | Performed by: FAMILY MEDICINE

## 2022-03-28 PROCEDURE — 3008F BODY MASS INDEX DOCD: CPT | Performed by: FAMILY MEDICINE

## 2022-03-28 RX ORDER — LISINOPRIL 10 MG/1
20 TABLET ORAL EVERY EVENING
Qty: 180 TABLET | Refills: 1 | Status: SHIPPED | OUTPATIENT
Start: 2022-03-28

## 2022-03-28 RX ORDER — LISINOPRIL AND HYDROCHLOROTHIAZIDE 25; 20 MG/1; MG/1
1 TABLET ORAL EVERY MORNING
Qty: 90 TABLET | Refills: 1 | Status: SHIPPED | OUTPATIENT
Start: 2022-03-28 | End: 2022-06-26

## 2022-04-06 ENCOUNTER — PATIENT MESSAGE (OUTPATIENT)
Dept: FAMILY MEDICINE CLINIC | Facility: CLINIC | Age: 47
End: 2022-04-06

## 2022-04-07 ENCOUNTER — HOSPITAL ENCOUNTER (EMERGENCY)
Facility: HOSPITAL | Age: 47
Discharge: HOME | End: 2022-04-07
Attending: EMERGENCY MEDICINE
Payer: COMMERCIAL

## 2022-04-07 ENCOUNTER — APPOINTMENT (OUTPATIENT)
Dept: CT IMAGING | Facility: HOSPITAL | Age: 47
End: 2022-04-07
Attending: EMERGENCY MEDICINE
Payer: COMMERCIAL

## 2022-04-07 ENCOUNTER — APPOINTMENT (OUTPATIENT)
Dept: GENERAL RADIOLOGY | Facility: HOSPITAL | Age: 47
End: 2022-04-07
Payer: COMMERCIAL

## 2022-04-07 VITALS
BODY MASS INDEX: 42 KG/M2 | RESPIRATION RATE: 20 BRPM | TEMPERATURE: 98 F | SYSTOLIC BLOOD PRESSURE: 166 MMHG | WEIGHT: 222.69 LBS | HEART RATE: 79 BPM | OXYGEN SATURATION: 96 % | DIASTOLIC BLOOD PRESSURE: 85 MMHG

## 2022-04-07 DIAGNOSIS — M94.0 COSTOCHONDRITIS, ACUTE: Primary | ICD-10-CM

## 2022-04-07 LAB
ALBUMIN SERPL-MCNC: 3.4 G/DL (ref 3.4–5)
ALBUMIN/GLOB SERPL: 0.8 {RATIO} (ref 1–2)
ALP LIVER SERPL-CCNC: 81 U/L
ALT SERPL-CCNC: 33 U/L
ANION GAP SERPL CALC-SCNC: 5 MMOL/L (ref 0–18)
AST SERPL-CCNC: 22 U/L (ref 15–37)
ATRIAL RATE: 88 BPM
BASOPHILS # BLD AUTO: 0.06 X10(3) UL (ref 0–0.2)
BASOPHILS NFR BLD AUTO: 0.7 %
BILIRUB SERPL-MCNC: 0.3 MG/DL (ref 0.1–2)
BUN BLD-MCNC: 11 MG/DL (ref 7–18)
CALCIUM BLD-MCNC: 8.7 MG/DL (ref 8.5–10.1)
CHLORIDE SERPL-SCNC: 108 MMOL/L (ref 98–112)
CO2 SERPL-SCNC: 25 MMOL/L (ref 21–32)
CREAT BLD-MCNC: 0.5 MG/DL
D DIMER PPP FEU-MCNC: 0.51 UG/ML FEU (ref ?–0.5)
EOSINOPHIL # BLD AUTO: 0.12 X10(3) UL (ref 0–0.7)
EOSINOPHIL NFR BLD AUTO: 1.5 %
ERYTHROCYTE [DISTWIDTH] IN BLOOD BY AUTOMATED COUNT: 14 %
GLOBULIN PLAS-MCNC: 4.1 G/DL (ref 2.8–4.4)
GLUCOSE BLD-MCNC: 99 MG/DL (ref 70–99)
HCT VFR BLD AUTO: 40.1 %
HGB BLD-MCNC: 13.5 G/DL
IMM GRANULOCYTES # BLD AUTO: 0.03 X10(3) UL (ref 0–1)
IMM GRANULOCYTES NFR BLD: 0.4 %
LYMPHOCYTES # BLD AUTO: 2.2 X10(3) UL (ref 1–4)
LYMPHOCYTES NFR BLD AUTO: 27 %
MCH RBC QN AUTO: 28 PG (ref 26–34)
MCHC RBC AUTO-ENTMCNC: 33.7 G/DL (ref 31–37)
MCV RBC AUTO: 83.2 FL
MONOCYTES # BLD AUTO: 0.65 X10(3) UL (ref 0.1–1)
MONOCYTES NFR BLD AUTO: 8 %
NEUTROPHILS # BLD AUTO: 5.1 X10 (3) UL (ref 1.5–7.7)
NEUTROPHILS # BLD AUTO: 5.1 X10(3) UL (ref 1.5–7.7)
NEUTROPHILS NFR BLD AUTO: 62.4 %
OSMOLALITY SERPL CALC.SUM OF ELEC: 285 MOSM/KG (ref 275–295)
P AXIS: 37 DEGREES
P-R INTERVAL: 154 MS
PLATELET # BLD AUTO: 338 10(3)UL (ref 150–450)
POTASSIUM SERPL-SCNC: 3.7 MMOL/L (ref 3.5–5.1)
PROT SERPL-MCNC: 7.5 G/DL (ref 6.4–8.2)
Q-T INTERVAL: 404 MS
QRS DURATION: 142 MS
QTC CALCULATION (BEZET): 488 MS
R AXIS: -35 DEGREES
RBC # BLD AUTO: 4.82 X10(6)UL
SODIUM SERPL-SCNC: 138 MMOL/L (ref 136–145)
T AXIS: 80 DEGREES
TROPONIN I HIGH SENSITIVITY: 4 NG/L
VENTRICULAR RATE: 88 BPM
WBC # BLD AUTO: 8.2 X10(3) UL (ref 4–11)

## 2022-04-07 PROCEDURE — 99284 EMERGENCY DEPT VISIT MOD MDM: CPT

## 2022-04-07 PROCEDURE — 93010 ELECTROCARDIOGRAM REPORT: CPT

## 2022-04-07 PROCEDURE — 85025 COMPLETE CBC W/AUTO DIFF WBC: CPT | Performed by: EMERGENCY MEDICINE

## 2022-04-07 PROCEDURE — 71045 X-RAY EXAM CHEST 1 VIEW: CPT | Performed by: EMERGENCY MEDICINE

## 2022-04-07 PROCEDURE — 85379 FIBRIN DEGRADATION QUANT: CPT | Performed by: EMERGENCY MEDICINE

## 2022-04-07 PROCEDURE — 71275 CT ANGIOGRAPHY CHEST: CPT | Performed by: EMERGENCY MEDICINE

## 2022-04-07 PROCEDURE — 96374 THER/PROPH/DIAG INJ IV PUSH: CPT

## 2022-04-07 PROCEDURE — 80053 COMPREHEN METABOLIC PANEL: CPT | Performed by: EMERGENCY MEDICINE

## 2022-04-07 PROCEDURE — 84484 ASSAY OF TROPONIN QUANT: CPT | Performed by: EMERGENCY MEDICINE

## 2022-04-07 PROCEDURE — 93005 ELECTROCARDIOGRAM TRACING: CPT

## 2022-04-07 RX ORDER — IOHEXOL 350 MG/ML
100 INJECTION, SOLUTION INTRAVENOUS
Status: COMPLETED | OUTPATIENT
Start: 2022-04-07 | End: 2022-04-07

## 2022-04-07 RX ORDER — KETOROLAC TROMETHAMINE 30 MG/ML
15 INJECTION, SOLUTION INTRAMUSCULAR; INTRAVENOUS ONCE
Status: COMPLETED | OUTPATIENT
Start: 2022-04-07 | End: 2022-04-07

## 2022-04-12 ENCOUNTER — PATIENT OUTREACH (OUTPATIENT)
Dept: CASE MANAGEMENT | Age: 47
End: 2022-04-12

## 2022-04-12 NOTE — PROGRESS NOTES
1st attempt ED PCP f/u apt request (dc 04/07)    Dr Lyudmila Clarke  [address] [place] [id number]   [facility] to call [phone] to assist w/scheduling apt; will try again

## 2022-04-26 ENCOUNTER — TELEPHONE (OUTPATIENT)
Dept: FAMILY MEDICINE CLINIC | Facility: CLINIC | Age: 47
End: 2022-04-26

## 2022-04-26 NOTE — TELEPHONE ENCOUNTER
Offered pt ALL options below, pt declined due to work. Said pt Thursday, Friday or Saturday works best for her. No openings this week. Please advise.

## 2022-04-26 NOTE — TELEPHONE ENCOUNTER
Pt calling to schedule ED f/u with . Kirti Ochoa she is still experiencing pain since her last OV 3/28.  Visited ED 4/7/22 for Costochondritis      Please advise where to schedule

## 2022-04-26 NOTE — TELEPHONE ENCOUNTER
Offer ER f/u in opening tomorrow (4/27) @5:40pm, or next week Monday (5/2) @1:00pm, Tuesday @10:00am. Thank you.

## 2022-05-04 ENCOUNTER — TELEPHONE (OUTPATIENT)
Dept: FAMILY MEDICINE CLINIC | Facility: CLINIC | Age: 47
End: 2022-05-04

## 2022-05-04 ENCOUNTER — PATIENT MESSAGE (OUTPATIENT)
Dept: FAMILY MEDICINE CLINIC | Facility: CLINIC | Age: 47
End: 2022-05-04

## 2022-05-04 ENCOUNTER — HOSPITAL ENCOUNTER (OUTPATIENT)
Dept: MAMMOGRAPHY | Age: 47
Discharge: HOME | End: 2022-05-04
Attending: FAMILY MEDICINE
Payer: COMMERCIAL

## 2022-05-04 DIAGNOSIS — Z12.31 ENCOUNTER FOR SCREENING MAMMOGRAM FOR MALIGNANT NEOPLASM OF BREAST: ICD-10-CM

## 2022-05-04 NOTE — TELEPHONE ENCOUNTER
Pt needs further assessment for left breast pain to ensure appropriate tests are ordered. Front- please call pt to schedule- availability Friday (5/6) @8:00am or SDA. Thank you.

## 2022-05-04 NOTE — TELEPHONE ENCOUNTER
Patient went for mammogram appointment today, but due to Lt breast pain she was told to reschedule and get a new order for a bilateral diagnostic mammogram.    please notify patient when new orders are places so she can schedule accordingly.

## 2022-05-07 ENCOUNTER — OFFICE VISIT (OUTPATIENT)
Dept: FAMILY MEDICINE CLINIC | Facility: CLINIC | Age: 47
End: 2022-05-07
Payer: COMMERCIAL

## 2022-05-07 VITALS
SYSTOLIC BLOOD PRESSURE: 118 MMHG | OXYGEN SATURATION: 99 % | DIASTOLIC BLOOD PRESSURE: 68 MMHG | HEIGHT: 61 IN | BODY MASS INDEX: 42 KG/M2 | RESPIRATION RATE: 18 BRPM | HEART RATE: 92 BPM | TEMPERATURE: 97 F

## 2022-05-07 DIAGNOSIS — R06.02 SHORTNESS OF BREATH: ICD-10-CM

## 2022-05-07 DIAGNOSIS — M94.0 COSTOCHONDRITIS: ICD-10-CM

## 2022-05-07 DIAGNOSIS — N64.4 BREAST PAIN, LEFT: Primary | ICD-10-CM

## 2022-05-07 DIAGNOSIS — U09.9 POST-COVID SYNDROME: ICD-10-CM

## 2022-05-07 PROCEDURE — 3078F DIAST BP <80 MM HG: CPT | Performed by: FAMILY MEDICINE

## 2022-05-07 PROCEDURE — 3074F SYST BP LT 130 MM HG: CPT | Performed by: FAMILY MEDICINE

## 2022-05-07 PROCEDURE — 3008F BODY MASS INDEX DOCD: CPT | Performed by: FAMILY MEDICINE

## 2022-05-07 PROCEDURE — 99213 OFFICE O/P EST LOW 20 MIN: CPT | Performed by: FAMILY MEDICINE

## 2022-05-07 RX ORDER — PREDNISONE 10 MG/1
TABLET ORAL
Qty: 15 TABLET | Refills: 0 | Status: SHIPPED | OUTPATIENT
Start: 2022-05-07 | End: 2022-05-12

## 2022-05-07 NOTE — PATIENT INSTRUCTIONS
Please take the steroids as prescribed, costochondritis is muscular pain and is not due to heart disease. Your cardiac work-up so far has been negative. Please follow-up with the pulmonologist for evaluation.

## 2022-05-12 DIAGNOSIS — I10 PRIMARY HYPERTENSION: ICD-10-CM

## 2022-05-12 RX ORDER — LISINOPRIL 10 MG/1
TABLET ORAL
Qty: 180 TABLET | Refills: 1 | OUTPATIENT
Start: 2022-05-12

## 2022-06-20 ENCOUNTER — TELEPHONE (OUTPATIENT)
Dept: FAMILY MEDICINE CLINIC | Facility: CLINIC | Age: 47
End: 2022-06-20

## 2022-06-20 DIAGNOSIS — M94.0 COSTOCHONDRITIS: ICD-10-CM

## 2022-06-20 DIAGNOSIS — N64.4 BREAST PAIN, LEFT: ICD-10-CM

## 2022-06-20 DIAGNOSIS — R07.9 CHEST PAIN, UNSPECIFIED TYPE: Primary | ICD-10-CM

## 2022-06-20 NOTE — TELEPHONE ENCOUNTER
ongoing chest pain on left side,  unable to se Pulmolonogist until July  wants to know if should continue with advil for muscle pain?

## 2022-06-21 NOTE — TELEPHONE ENCOUNTER
Called patient and advised per Dr. Neil Varela that she should only take the advil as needed. Also advised of referral to Pain Medicine Service. States since yesterday she has been taking Advil 3 tabs (600 mg) every eight hours. States pain has also moved to a little lower on the left chest area. Wants to know how much advil she can take until she sees the Pain Service.     Dr. Neil Varela,  Please advise

## 2022-06-22 NOTE — TELEPHONE ENCOUNTER
[phone]   for pt ([facility] per HIPAA) to inform per PCP indicated if pt can be scheduled for video visit so to further discuss pt's sx. Offered first availability Tuesday (6/28 SDA) @10:00am. To call back at the office to schedule or review other dates/times available. Front- please hold for pt call back to schedule video visit as per PCP. Thank you.

## 2022-08-18 ENCOUNTER — TELEPHONE (OUTPATIENT)
Dept: FAMILY MEDICINE CLINIC | Facility: CLINIC | Age: 47
End: 2022-08-18

## 2022-08-18 NOTE — TELEPHONE ENCOUNTER
[phone]  Called pt stating has been experiencing persistent HA x3 days, as per pt is very unusual for her. Tried 2 OTC Advil's with no improvements. Will try Tylenol. SOB, dizziness, diarrhea x3 days. Home covid test positive. Vision is starting to get blurry. Pt feels \"out of body experience\". No CP. No palpitations. No N/V. No abd pain. No bladder issues. No fever/ chills. No weakness. No numbness/tingling of extremities. Keeping hydrated as best as can. Will follow bland/ BRAT diet, avoid dairy, spicy and acidic foods. Advised to go to  for further eval and work up, r/o not cardiac related, may need possibly nebulizer tx, IV fluids, etc. Pt indicated may need to wait until tomorrow morning for nephew to drive pt. Encouraged pt to go today. If sx worsen, to call 911 for ambulance assistance. No further questions or concerns. Pt verbalized understanding and agreed with POC. RICHA

## 2022-08-18 NOTE — TELEPHONE ENCOUNTER
Headaches, shortness of breath,dizziness. Please triage and adivse. Patient will be testing for covid today. Had covid in March 2020.

## 2022-08-21 ENCOUNTER — PATIENT MESSAGE (OUTPATIENT)
Dept: FAMILY MEDICINE CLINIC | Facility: CLINIC | Age: 47
End: 2022-08-21

## 2022-08-22 ENCOUNTER — HOSPITAL ENCOUNTER (OUTPATIENT)
Age: 47
Discharge: HOME | End: 2022-08-22
Payer: COMMERCIAL

## 2022-08-22 ENCOUNTER — TELEPHONE (OUTPATIENT)
Dept: FAMILY MEDICINE CLINIC | Facility: CLINIC | Age: 47
End: 2022-08-22

## 2022-08-22 VITALS
WEIGHT: 221.81 LBS | HEART RATE: 83 BPM | OXYGEN SATURATION: 98 % | TEMPERATURE: 98 F | DIASTOLIC BLOOD PRESSURE: 75 MMHG | RESPIRATION RATE: 18 BRPM | HEIGHT: 61 IN | SYSTOLIC BLOOD PRESSURE: 140 MMHG | BODY MASS INDEX: 41.88 KG/M2

## 2022-08-22 DIAGNOSIS — Z20.822 CONTACT WITH AND (SUSPECTED) EXPOSURE TO COVID-19: Primary | ICD-10-CM

## 2022-08-22 DIAGNOSIS — R51.9 NONINTRACTABLE HEADACHE, UNSPECIFIED CHRONICITY PATTERN, UNSPECIFIED HEADACHE TYPE: ICD-10-CM

## 2022-08-22 LAB — SARS-COV-2 RNA RESP QL NAA+PROBE: NOT DETECTED

## 2022-08-22 PROCEDURE — 99214 OFFICE O/P EST MOD 30 MIN: CPT | Performed by: NURSE PRACTITIONER

## 2022-08-22 PROCEDURE — 99213 OFFICE O/P EST LOW 20 MIN: CPT | Performed by: NURSE PRACTITIONER

## 2022-08-22 PROCEDURE — U0002 COVID-19 LAB TEST NON-CDC: HCPCS | Performed by: NURSE PRACTITIONER

## 2022-08-22 RX ORDER — NAPROXEN 500 MG/1
500 TABLET ORAL 2 TIMES DAILY PRN
Qty: 20 TABLET | Refills: 0 | Status: SHIPPED | OUTPATIENT
Start: 2022-08-22 | End: 2022-08-23 | Stop reason: ALTCHOICE

## 2022-08-22 RX ORDER — KETOROLAC TROMETHAMINE 30 MG/ML
30 INJECTION, SOLUTION INTRAMUSCULAR; INTRAVENOUS ONCE
Status: DISCONTINUED | OUTPATIENT
Start: 2022-08-22 | End: 2022-08-22

## 2022-08-22 RX ORDER — SODIUM CHLORIDE 9 MG/ML
1000 INJECTION, SOLUTION INTRAVENOUS ONCE
Status: DISCONTINUED | OUTPATIENT
Start: 2022-08-22 | End: 2022-08-22

## 2022-08-22 NOTE — TELEPHONE ENCOUNTER
Scheduled pt, advised to be here at 10:20am per MD    Future Appointments   Date Time Provider Fe Amanda   8/23/2022 10:40 AM Jackie Noel MD [facility]

## 2022-08-22 NOTE — ED INITIAL ASSESSMENT (HPI)
Pounding HA left temporal  x 1 week. Diarrhea last week. Home covid test presumed positive 4 days ago. HA has improved but referred to IC to Confirm Covid. Denies Blurred vision or dizziness. 1 Gram tylenol last taken yesterday w relief.

## 2022-08-22 NOTE — TELEPHONE ENCOUNTER
Spoke with , found a spot we could fit her in at. Advised pt to be here at 10:20am tomorrow.      Future Appointments   Date Time Provider Fe Patel   8/23/2022 10:40 AM Francine Dow MD [facility]

## 2022-08-22 NOTE — TELEPHONE ENCOUNTER
From: Monika Gregory  To: Parris Stevens MD  Sent: 8/21/2022 10:20 PM CDT  Subject: Headaches and dizziness    Dr. Aarti Osorio. I called this past week and spoke to your nurse because I am still experiencing shortness of breath, constant headache and dizziness. She asked me to take a Covid test, and I did. It turned out positive. We are now a week later, and I still have a headache. I will be going into Urgent Care tomorrow morning. I would've gone today, but there is not testing done on Sundays. Dr. Aarti Zelaya, if we can meet for even a video call, I really need to speak with you. 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 I am trying my best to fulfill my duties, but I don't feel well. I want someone to please listen to my concerns. Is there a way I can do FMLA for 2-3 months until I heal and feel well enough to drive, stand, breathe and function normally? I have severe tinnitus right now as well. I believe these are all post-Covid, long-hauler symptoms I have been dealing with for a long time. Please let me know if you could help me or meet with me via Zoom. I will be going to Urgent Care in the late morning.  Thank you. Zafar Shaffer

## 2022-08-22 NOTE — TELEPHONE ENCOUNTER
Pt called stating she went to Urgent Care today for the following:  Headaches, dizziness, diarrhea,  Took a at [address] test which was positive. They took a Covid test while there which was negative,      They suggested IV with a medication which she would not take due to side effects & being only a Temp fix,      She said she received conflicting instructions on what to do next. Wants to speak to nurse here.

## 2022-08-23 ENCOUNTER — OFFICE VISIT (OUTPATIENT)
Dept: FAMILY MEDICINE CLINIC | Facility: CLINIC | Age: 47
End: 2022-08-23
Payer: COMMERCIAL

## 2022-08-23 VITALS
WEIGHT: 223 LBS | TEMPERATURE: 98 F | DIASTOLIC BLOOD PRESSURE: 70 MMHG | SYSTOLIC BLOOD PRESSURE: 130 MMHG | RESPIRATION RATE: 18 BRPM | HEART RATE: 88 BPM | OXYGEN SATURATION: 98 % | HEIGHT: 61 IN | BODY MASS INDEX: 42.1 KG/M2

## 2022-08-23 DIAGNOSIS — M94.0 COSTOCHONDRITIS: ICD-10-CM

## 2022-08-23 DIAGNOSIS — R06.02 SOB (SHORTNESS OF BREATH): ICD-10-CM

## 2022-08-23 DIAGNOSIS — B34.9 VIRAL SYNDROME: ICD-10-CM

## 2022-08-23 DIAGNOSIS — G44.52 NEW DAILY PERSISTENT HEADACHE: Primary | ICD-10-CM

## 2022-08-23 PROCEDURE — 99214 OFFICE O/P EST MOD 30 MIN: CPT | Performed by: FAMILY MEDICINE

## 2022-08-23 PROCEDURE — 3075F SYST BP GE 130 - 139MM HG: CPT | Performed by: FAMILY MEDICINE

## 2022-08-23 PROCEDURE — 3008F BODY MASS INDEX DOCD: CPT | Performed by: FAMILY MEDICINE

## 2022-08-23 PROCEDURE — 3078F DIAST BP <80 MM HG: CPT | Performed by: FAMILY MEDICINE

## 2022-08-23 RX ORDER — LISINOPRIL AND HYDROCHLOROTHIAZIDE 25; 20 MG/1; MG/1
1 TABLET ORAL EVERY MORNING
COMMUNITY
Start: 2022-07-16

## 2022-08-23 NOTE — PATIENT INSTRUCTIONS
Try over the counter Aspercreme with lidocaine or Lidocaine 4% to your chest wall to see if it helps with the pain.     Please schedule your appointment with the pulmonologist

## 2022-10-06 ENCOUNTER — TELEPHONE (OUTPATIENT)
Dept: FAMILY MEDICINE CLINIC | Facility: CLINIC | Age: 47
End: 2022-10-06

## 2022-10-06 NOTE — TELEPHONE ENCOUNTER
Returned call to patient who states yesterday she had an episode of very bad abdominal pain. Ate a light meal and had almond milk. Developed sharp/cramping abd pain and stomach became bloated and hard. Drinking water also caused mid abd pain. Felt a little better after work, went to [employer] and bought Stew and made mashed potatoes. After eating developed the same sharp cramping mid-abd pain, chills, temp 100.5 and immediately had diarrhea after eating. Today feels a little better but does get the pain after drinking water. Has been sipping gingerale and tea with honey. No fever today. Did a covid test that was negative. Denies any URI symptoms. Advised if pain returns or worsens or she develops pain with fever, she needs to go to the ED for more immediate evaluation. Advised no openings with Dr. Mis Marr. Appt scheduled for tomorrow with Dr. Eros Horowitz. Instructed to stay on clear liquids only until seen in office.     Future Appointments   Date Time Provider Fe Patel   10/7/2022  2:00 PM Moses Harrison,  [facility]

## 2022-10-06 NOTE — TELEPHONE ENCOUNTER
Abdominal pain and had a fever 100.5 yesterday. Had some diarrhea. Still has abdominal pain especially when she eats.  Please triage

## 2022-10-07 ENCOUNTER — OFFICE VISIT (OUTPATIENT)
Dept: FAMILY MEDICINE CLINIC | Facility: CLINIC | Age: 47
End: 2022-10-07
Payer: COMMERCIAL

## 2022-10-07 VITALS
HEIGHT: 61 IN | HEART RATE: 92 BPM | TEMPERATURE: 98 F | OXYGEN SATURATION: 98 % | DIASTOLIC BLOOD PRESSURE: 62 MMHG | WEIGHT: 222 LBS | SYSTOLIC BLOOD PRESSURE: 118 MMHG | BODY MASS INDEX: 41.91 KG/M2 | RESPIRATION RATE: 16 BRPM

## 2022-10-07 DIAGNOSIS — A08.4 VIRAL GASTROENTERITIS: Primary | ICD-10-CM

## 2022-10-07 PROCEDURE — 99213 OFFICE O/P EST LOW 20 MIN: CPT | Performed by: FAMILY MEDICINE

## 2022-10-07 PROCEDURE — 3074F SYST BP LT 130 MM HG: CPT | Performed by: FAMILY MEDICINE

## 2022-10-07 PROCEDURE — 3078F DIAST BP <80 MM HG: CPT | Performed by: FAMILY MEDICINE

## 2022-10-07 PROCEDURE — 3008F BODY MASS INDEX DOCD: CPT | Performed by: FAMILY MEDICINE

## 2022-10-10 ENCOUNTER — PATIENT MESSAGE (OUTPATIENT)
Dept: FAMILY MEDICINE CLINIC | Facility: CLINIC | Age: 47
End: 2022-10-10

## 2022-10-10 DIAGNOSIS — R10.31 RIGHT LOWER QUADRANT ABDOMINAL PAIN: Primary | ICD-10-CM

## 2022-10-10 NOTE — TELEPHONE ENCOUNTER
US ordered;   No concern for Appendicitis based on exam during [facility],   10/10/22 10:14 AM

## 2022-10-16 ENCOUNTER — PATIENT MESSAGE (OUTPATIENT)
Dept: FAMILY MEDICINE CLINIC | Facility: CLINIC | Age: 47
End: 2022-10-16

## 2022-10-17 NOTE — TELEPHONE ENCOUNTER
From: Mychal Gregory  To: Chandrakant Quinones MD  Sent: 10/16/2022 11:26 AM CDT  Subject: Blood pressure medication    Dr. Jose Osorio. I need a refill of my blood pressure medication. I had 90 day, and [facility] said they had an approval from you for 30 day only. They are sending another fax to request the 90 day. I was there a couple of weeks ago, and my blood pressure was normal.     Would I be able to get the 90 quantity? Thank you.   Mychal Larios

## 2022-10-18 ENCOUNTER — PATIENT MESSAGE (OUTPATIENT)
Dept: FAMILY MEDICINE CLINIC | Facility: CLINIC | Age: 47
End: 2022-10-18

## 2022-10-18 RX ORDER — LISINOPRIL AND HYDROCHLOROTHIAZIDE 25; 20 MG/1; MG/1
1 TABLET ORAL EVERY MORNING
Qty: 90 TABLET | Refills: 0 | Status: SHIPPED | OUTPATIENT
Start: 2022-10-18

## 2022-11-08 DIAGNOSIS — I10 PRIMARY HYPERTENSION: ICD-10-CM

## 2022-11-08 NOTE — TELEPHONE ENCOUNTER
LOV      LAST LAB 4/7/22     LAST RX       Next OV No future appointments.     PROTOCOL    Hypertension Medications Protocol Passed 11/08/2022 05:55 AM   Protocol Details  CMP or BMP in past 12 months    Last serum creatinine< 2.0    Appointment in past 6 or next 3 months      Outpatient  Date/Time Action Taken User Additional Information   10/18/22 545 [facility] Angus Hyde    10/18/22 1235 Sign Cathe Shone, MD Reorder from [id number]   10/18/22 1235 Taking 900 [facility] Cathe Shone, [place] [id number]         DENIED AS DUPLICATE, INSTRUCTIONS TO PHARMACY TO CHECK FOR NEW/ REFILLS

## 2022-11-09 RX ORDER — LISINOPRIL 10 MG/1
TABLET ORAL
Qty: 180 TABLET | Refills: 1 | OUTPATIENT
Start: 2022-11-09

## 2022-11-20 ENCOUNTER — PATIENT MESSAGE (OUTPATIENT)
Dept: FAMILY MEDICINE CLINIC | Facility: CLINIC | Age: 47
End: 2022-11-20

## 2022-11-21 NOTE — TELEPHONE ENCOUNTER
I sent message asking she schedule a visit. Routing to you more as an FYI for when she reaches out or to determine if this is something you can help her with.

## 2022-12-13 ENCOUNTER — OFFICE VISIT (OUTPATIENT)
Dept: FAMILY MEDICINE CLINIC | Facility: CLINIC | Age: 47
End: 2022-12-13
Payer: COMMERCIAL

## 2022-12-13 ENCOUNTER — TELEPHONE (OUTPATIENT)
Dept: FAMILY MEDICINE CLINIC | Facility: CLINIC | Age: 47
End: 2022-12-13

## 2022-12-13 VITALS
OXYGEN SATURATION: 98 % | HEIGHT: 61 IN | BODY MASS INDEX: 42 KG/M2 | SYSTOLIC BLOOD PRESSURE: 110 MMHG | DIASTOLIC BLOOD PRESSURE: 82 MMHG | RESPIRATION RATE: 18 BRPM | HEART RATE: 99 BPM | TEMPERATURE: 97 F

## 2022-12-13 DIAGNOSIS — I10 PRIMARY HYPERTENSION: ICD-10-CM

## 2022-12-13 DIAGNOSIS — M94.0 COSTOCHONDRITIS: Primary | ICD-10-CM

## 2022-12-13 PROCEDURE — 99213 OFFICE O/P EST LOW 20 MIN: CPT | Performed by: FAMILY MEDICINE

## 2022-12-13 PROCEDURE — 3074F SYST BP LT 130 MM HG: CPT | Performed by: FAMILY MEDICINE

## 2022-12-13 PROCEDURE — 3079F DIAST BP 80-89 MM HG: CPT | Performed by: FAMILY MEDICINE

## 2022-12-13 PROCEDURE — 3008F BODY MASS INDEX DOCD: CPT | Performed by: FAMILY MEDICINE

## 2022-12-13 RX ORDER — LISINOPRIL 10 MG/1
20 TABLET ORAL EVERY EVENING
Qty: 180 TABLET | Refills: 1 | Status: SHIPPED | OUTPATIENT
Start: 2022-12-13

## 2022-12-13 RX ORDER — METHYLPREDNISOLONE 4 MG/1
TABLET ORAL
Qty: 21 EACH | Refills: 0 | Status: SHIPPED | OUTPATIENT
Start: 2022-12-13

## 2022-12-13 RX ORDER — LISINOPRIL AND HYDROCHLOROTHIAZIDE 25; 20 MG/1; MG/1
1 TABLET ORAL EVERY MORNING
Qty: 90 TABLET | Refills: 1 | Status: SHIPPED | OUTPATIENT
Start: 2022-12-13

## 2022-12-19 NOTE — TELEPHONE ENCOUNTER
Patient called today to confirm if forms were sent. Confirmation of fax failed. Originally tried to send on the Dec 13th Will resend today 12/20 by MA today.

## 2022-12-20 ENCOUNTER — PATIENT MESSAGE (OUTPATIENT)
Dept: FAMILY MEDICINE CLINIC | Facility: CLINIC | Age: 47
End: 2022-12-20

## 2022-12-21 ENCOUNTER — MED REC SCAN ONLY (OUTPATIENT)
Dept: FAMILY MEDICINE CLINIC | Facility: CLINIC | Age: 47
End: 2022-12-21

## 2022-12-21 ENCOUNTER — PATIENT MESSAGE (OUTPATIENT)
Dept: FAMILY MEDICINE CLINIC | Facility: CLINIC | Age: 47
End: 2022-12-21

## 2022-12-21 NOTE — TELEPHONE ENCOUNTER
Faxed failed 6 times so I spoke with  and confirmed Fax number. Kiley Jones from  advised we should mail a copy to   them if fax continued to fail. Fortunately fax finally went through @ 3:47 12-21-22. A copy was placed for scanning and also put in pickup folder for patient.  I have fax confirmations in my desk

## 2023-03-06 NOTE — TELEPHONE ENCOUNTER
Patient stated she made an appointment for 3/28 with Dr. Alfredo Newberry. She is in need of enough Lisinopril HTZ and Lisinopril 10 mg to get her to her 3/28 appointment,    Please send to pharmacy.     [facility] #[id number] - [facility], [address] [place] [address], [phone], [phone] Subjective   Patient ID: Tony Us is a 10 y.o. male who presents for Well Child.  7th grader at [employer] .  Does well in school.  Sleeps well. Healthy appetite . Yumiko smoking/drugs/alcohol.  Helps mom and dad around the house         Review of Systems   Constitutional: Negative.    HENT: Negative.     Eyes: Negative.    Respiratory: Negative.     Cardiovascular: Negative.    Gastrointestinal: Negative.    Endocrine: Negative.    Genitourinary: Negative.    Musculoskeletal: Negative.    Allergic/Immunologic: Negative.    Neurological: Negative.    Hematological: Negative.    Psychiatric/Behavioral: Negative.         Objective   Physical Exam    Assessment/Plan

## 2023-06-11 DIAGNOSIS — I10 PRIMARY HYPERTENSION: ICD-10-CM

## 2023-06-13 NOTE — TELEPHONE ENCOUNTER
Pt called and scheduled. Asking for full prescription as she does not want to pay twice.  Please advise    Future Appointments   Date Time Provider Fe Amanda   6/21/2023  3:40 PM Mateo Varela MD [facility]

## 2023-06-14 RX ORDER — LISINOPRIL 10 MG/1
TABLET ORAL
Qty: 180 TABLET | Refills: 0 | Status: SHIPPED | OUTPATIENT
Start: 2023-06-14

## 2023-06-21 ENCOUNTER — OFFICE VISIT (OUTPATIENT)
Dept: FAMILY MEDICINE CLINIC | Facility: CLINIC | Age: 48
End: 2023-06-21
Payer: COMMERCIAL

## 2023-06-21 VITALS
TEMPERATURE: 98 F | HEIGHT: 61 IN | HEART RATE: 96 BPM | SYSTOLIC BLOOD PRESSURE: 110 MMHG | WEIGHT: 221 LBS | DIASTOLIC BLOOD PRESSURE: 80 MMHG | RESPIRATION RATE: 18 BRPM | OXYGEN SATURATION: 98 % | BODY MASS INDEX: 41.72 KG/M2

## 2023-06-21 DIAGNOSIS — R73.03 PREDIABETES: ICD-10-CM

## 2023-06-21 DIAGNOSIS — L40.9 PSORIASIS: ICD-10-CM

## 2023-06-21 DIAGNOSIS — I10 PRIMARY HYPERTENSION: Primary | ICD-10-CM

## 2023-06-21 DIAGNOSIS — Z12.31 ENCOUNTER FOR SCREENING MAMMOGRAM FOR MALIGNANT NEOPLASM OF BREAST: ICD-10-CM

## 2023-06-21 DIAGNOSIS — Z00.00 LABORATORY EXAM ORDERED AS PART OF ROUTINE GENERAL MEDICAL EXAMINATION: ICD-10-CM

## 2023-06-21 DIAGNOSIS — Z12.11 SCREENING FOR COLON CANCER: ICD-10-CM

## 2023-06-21 DIAGNOSIS — E66.01 MORBID OBESITY WITH BMI OF 40.0-44.9, ADULT (HCC): ICD-10-CM

## 2023-06-21 PROCEDURE — 3079F DIAST BP 80-89 MM HG: CPT | Performed by: FAMILY MEDICINE

## 2023-06-21 PROCEDURE — 3074F SYST BP LT 130 MM HG: CPT | Performed by: FAMILY MEDICINE

## 2023-06-21 PROCEDURE — 3008F BODY MASS INDEX DOCD: CPT | Performed by: FAMILY MEDICINE

## 2023-06-21 PROCEDURE — 99214 OFFICE O/P EST MOD 30 MIN: CPT | Performed by: FAMILY MEDICINE

## 2023-06-21 RX ORDER — LISINOPRIL AND HYDROCHLOROTHIAZIDE 25; 20 MG/1; MG/1
1 TABLET ORAL EVERY MORNING
Qty: 90 TABLET | Refills: 1 | Status: SHIPPED | OUTPATIENT
Start: 2023-06-21

## 2023-06-21 RX ORDER — LISINOPRIL 10 MG/1
20 TABLET ORAL EVERY EVENING
Qty: 180 TABLET | Refills: 0 | Status: SHIPPED | OUTPATIENT
Start: 2023-06-21 | End: 2023-06-21

## 2023-06-21 RX ORDER — LISINOPRIL 10 MG/1
10 TABLET ORAL EVERY EVENING
Qty: 90 TABLET | Refills: 0 | Status: SHIPPED | OUTPATIENT
Start: 2023-06-21

## 2023-07-31 ENCOUNTER — PATIENT MESSAGE (OUTPATIENT)
Dept: FAMILY MEDICINE CLINIC | Facility: CLINIC | Age: 48
End: 2023-07-31

## 2023-08-02 ENCOUNTER — PATIENT MESSAGE (OUTPATIENT)
Dept: FAMILY MEDICINE CLINIC | Facility: CLINIC | Age: 48
End: 2023-08-02

## 2023-08-02 NOTE — TELEPHONE ENCOUNTER
From: Alee Gregory  To: Heather Wright MD  Sent: 7/31/2023 1:27 PM CDT  Subject: Waqar Fan, Dr. Neil Varela. About 2 weeks ago I fell ill after visiting the [address] [place] [address] with my parents. It was the first time I was around so many people. They are senior citizens and could barely walk, so we just did light things. I must have caught something around the crowd of people. I had a severe cough, felt weak, had problems breathing, my pain in my chest came back, and it hasn't left. Thankfully, my parents have been here taking care of me. It's been hard to breathe these last 10 days. I have to hold my chest to breathe like I did before, and it's painful. My gris wrote to me a few weeks ago before I fell ill, and she asked me if I would need another semester of accommodation. If I did, no worries, to just let her know. I didn't think I would need it, but after not being able to breathe and my chest hurting like before, I would feel more comfortable taking this semester to heal and get better. 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    I have a full physical with you on Sept. 8th. I plan to take all of the tests you required in the next few weeks, when I am better, so you have the most updated blood work and test results when we do my physical. I hope you will be able to write my letter again for this semester- Aug- Dec.   I know you said this was a chronic condition and could come back at any time, but I didn't think it would. :( I hope one day it goes away for good.  Thank you, Alee Bustillo

## 2023-08-02 NOTE — TELEPHONE ENCOUNTER
I will have to accommodate her in a 20 min visit as I have nothing available, please book for 20 min.

## 2023-08-02 NOTE — TELEPHONE ENCOUNTER
Pt r/c to schedule appt is 20 mins ok?  And please advise where to schedule pt requesting appt asap, said paperwork is for work accommodations to work from home due to her condition

## 2023-08-02 NOTE — TELEPHONE ENCOUNTER
Confirmed with PCP, pt will need 40min visit for [facility] paperwork. First available is Wednesday (9/6) @1:40pm.  Please advise if able to accommodate pt sooner. Thank you.

## 2023-08-10 ENCOUNTER — OFFICE VISIT (OUTPATIENT)
Dept: FAMILY MEDICINE CLINIC | Facility: CLINIC | Age: 48
End: 2023-08-10
Payer: COMMERCIAL

## 2023-08-10 VITALS
OXYGEN SATURATION: 98 % | TEMPERATURE: 97 F | HEIGHT: 61 IN | DIASTOLIC BLOOD PRESSURE: 72 MMHG | HEART RATE: 98 BPM | BODY MASS INDEX: 41.54 KG/M2 | WEIGHT: 220 LBS | RESPIRATION RATE: 18 BRPM | SYSTOLIC BLOOD PRESSURE: 130 MMHG

## 2023-08-10 DIAGNOSIS — Z02.89 ENCOUNTER FOR COMPLETION OF FORM WITH PATIENT: ICD-10-CM

## 2023-08-10 DIAGNOSIS — M94.0 COSTOCHONDRITIS: Primary | ICD-10-CM

## 2023-08-10 PROCEDURE — 3008F BODY MASS INDEX DOCD: CPT | Performed by: FAMILY MEDICINE

## 2023-08-10 PROCEDURE — 3075F SYST BP GE 130 - 139MM HG: CPT | Performed by: FAMILY MEDICINE

## 2023-08-10 PROCEDURE — 3078F DIAST BP <80 MM HG: CPT | Performed by: FAMILY MEDICINE

## 2023-08-10 PROCEDURE — 99214 OFFICE O/P EST MOD 30 MIN: CPT | Performed by: FAMILY MEDICINE

## 2023-08-10 RX ORDER — PREDNISONE 10 MG/1
TABLET ORAL
Qty: 21 TABLET | Refills: 0 | Status: SHIPPED | OUTPATIENT
Start: 2023-08-10 | End: 2023-08-16

## 2023-08-11 ENCOUNTER — PATIENT MESSAGE (OUTPATIENT)
Dept: FAMILY MEDICINE CLINIC | Facility: CLINIC | Age: 48
End: 2023-08-11

## 2023-08-11 DIAGNOSIS — E55.9 VITAMIN D DEFICIENCY: Primary | ICD-10-CM

## 2023-08-11 NOTE — TELEPHONE ENCOUNTER
From: Loren Gregory  To: Tanisha Ferguson MD  Sent: 8/11/2023 11:21 AM CDT  Subject: Issue with fax    Damien Maizes.  I apologize, but HR contacted me via e-mail at [address] [place] [address] yesterday, and they said they did not receive Dr. Peña Medel documents via fax. :(     Thank you,  Loren Peguero

## 2023-08-16 ENCOUNTER — MED REC SCAN ONLY (OUTPATIENT)
Dept: FAMILY MEDICINE CLINIC | Facility: CLINIC | Age: 48
End: 2023-08-16

## 2023-09-09 DIAGNOSIS — I10 PRIMARY HYPERTENSION: ICD-10-CM

## 2023-09-11 DIAGNOSIS — I10 PRIMARY HYPERTENSION: ICD-10-CM

## 2023-09-11 RX ORDER — LISINOPRIL 10 MG/1
20 TABLET ORAL EVERY EVENING
Qty: 180 TABLET | Refills: 0 | OUTPATIENT
Start: 2023-09-11

## 2023-09-11 NOTE — TELEPHONE ENCOUNTER
LOV    LAST LAB    LAST RX    Next OV    PROTOCOL   Name from pharmacy: LISINOPRIL 10MG TABLETS         Will file in chart as: LISINOPRIL 10 MG Oral Tab    Sig: TAKE 2 TABLETS(20 MG) BY MOUTH EVERY EVENING    Disp: 180 tablet    Refills: 0 (Pharmacy requested: Not specified)    Start: 9/9/2023    Class: Normal    Non-formulary For: Primary hypertension    Last ordered: 2 months ago by Roosevelt Augustine MD Last refill: 6/14/2023    Rx #: [id number]    Hypertension Medications Protocol [id number]/09/2023 05:46 AM   Protocol Details CMP or BMP in past 12 months    Last serum creatinine< 2.0    Appointment in past 6 or next 3 months

## 2023-09-12 RX ORDER — LISINOPRIL 10 MG/1
10 TABLET ORAL EVERY EVENING
Qty: 90 TABLET | Refills: 0 | Status: SHIPPED | OUTPATIENT
Start: 2023-09-12

## 2023-09-28 ENCOUNTER — PATIENT MESSAGE (OUTPATIENT)
Dept: FAMILY MEDICINE CLINIC | Facility: CLINIC | Age: 48
End: 2023-09-28

## 2023-09-28 NOTE — TELEPHONE ENCOUNTER
From: Carlos Gregory  To: Sarah Moran Mary  Sent: 9/28/2023 7:36 AM CDT  Subject: Srinath Flores, Dr. Albert Candelaria. 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 I have recently been contacted by HR to revise or make clear some aspects of the accommodations request form you filled out. I am attaching the revisions they asked me to send to you. I apologize in advance for any extra work this may take. I don't feel it's fair because to me and my fellow 1 Felipe Gudino, who protect faculty rights, the form is quite clear.

## 2023-09-29 ENCOUNTER — PATIENT MESSAGE (OUTPATIENT)
Dept: FAMILY MEDICINE CLINIC | Facility: CLINIC | Age: 48
End: 2023-09-29

## 2023-09-29 NOTE — TELEPHONE ENCOUNTER
From: Sara Gregory  To: Nicolette Hernandez  Sent: 9/29/2023 8:28 AM CDT  Subject: Better copy of previous forms    Hello. I took another photo of the accommodations request forms Dr. Sunny Jacob filled out.

## 2023-09-29 NOTE — TELEPHONE ENCOUNTER
VM left for Patient. As per Dr Obdulia Pizano need a better copy of the form so she can complete. Form put back in Dr's in box in her office.

## 2023-10-06 ENCOUNTER — PATIENT MESSAGE (OUTPATIENT)
Dept: FAMILY MEDICINE CLINIC | Facility: CLINIC | Age: 48
End: 2023-10-06

## 2023-10-09 ENCOUNTER — MED REC SCAN ONLY (OUTPATIENT)
Dept: FAMILY MEDICINE CLINIC | Facility: CLINIC | Age: 48
End: 2023-10-09

## 2023-10-09 ENCOUNTER — TELEPHONE (OUTPATIENT)
Dept: FAMILY MEDICINE CLINIC | Facility: CLINIC | Age: 48
End: 2023-10-09

## 2023-10-09 NOTE — TELEPHONE ENCOUNTER
VML for Pt. Informed her Forms ready. At [address] [place] [address] Rd desk in Pt pickup folder.       Copied & sent to scanning

## 2023-10-09 NOTE — TELEPHONE ENCOUNTER
Forms . Patient states forms was supposed to be faxed to a fax number that is provided on a form . Per MA that fax number keeps failing . Patient was informed that the fax is failing and provide up with alternative fax .

## 2023-10-17 ENCOUNTER — PATIENT MESSAGE (OUTPATIENT)
Dept: FAMILY MEDICINE CLINIC | Facility: CLINIC | Age: 48
End: 2023-10-17

## 2023-10-17 DIAGNOSIS — I10 PRIMARY HYPERTENSION: ICD-10-CM

## 2023-10-20 NOTE — TELEPHONE ENCOUNTER
From: Wale Gregory  To: Nicolette Hernandez  Sent: 10/17/2023 2:01 PM CDT  Subject: Medication    Hi, Dr. Jefry Newberry. Thank you for the medical forms. I received them. I am writing concerning my medication. You asked that I try 10mg instead of 20mg per evening for Lisinopril. My blood pressure was slight high, so I've been doing 20mg, as per usual. The last prescription you sent was only for 90 tablets, not the 180 I usually receive, so I am about to run out. Can you please send a refill for the 180 10mg, and 90 20mg with hctz I take in the mornings? I am about to run out of both. [facility] said they only have the refill of the 90 10mg on file, not the 180. They will need the refill of the 90 20/25mg refill for daytime as well. Thank you.

## 2023-10-21 RX ORDER — LISINOPRIL AND HYDROCHLOROTHIAZIDE 25; 20 MG/1; MG/1
1 TABLET ORAL EVERY MORNING
Qty: 90 TABLET | Refills: 0 | Status: SHIPPED | OUTPATIENT
Start: 2023-10-21

## 2023-10-21 RX ORDER — LISINOPRIL 10 MG/1
10 TABLET ORAL 2 TIMES DAILY
Qty: 180 TABLET | Refills: 0 | Status: SHIPPED | OUTPATIENT
Start: 2023-10-21

## 2023-11-08 ENCOUNTER — PATIENT MESSAGE (OUTPATIENT)
Dept: FAMILY MEDICINE CLINIC | Facility: CLINIC | Age: 48
End: 2023-11-08

## 2023-11-08 DIAGNOSIS — L65.9 HAIR LOSS: Primary | ICD-10-CM

## 2023-11-13 NOTE — TELEPHONE ENCOUNTER
From: Fernanda Gregory  To: Madhav Reesjorge Baimark  Sent: 11/8/2023 4:31 PM CST  Subject: Upcoming physical    Hello, Dr. Destinee Beltran. I have blood work I have to complete before my physical on Nov. 20th, which I intend to do by next week. Can you please confirm the blood work you ordered for me? In that list, do you have a test for my hormones? If not, could you add it? I have been losing a lot of hair and have facial hair growing by my chin and mouth area. It is concerning me. Finally, I would like to complete my blood work in the same [address] as your office. Do I call them to do that, or could I reserve my time slot online? Thank you.     Fernanda Mc

## 2023-11-17 ENCOUNTER — LAB ENCOUNTER (OUTPATIENT)
Dept: LAB | Age: 48
End: 2023-11-17
Attending: FAMILY MEDICINE
Payer: COMMERCIAL

## 2023-11-17 DIAGNOSIS — Z00.00 LABORATORY EXAM ORDERED AS PART OF ROUTINE GENERAL MEDICAL EXAMINATION: ICD-10-CM

## 2023-11-17 DIAGNOSIS — R73.03 PREDIABETES: ICD-10-CM

## 2023-11-17 DIAGNOSIS — L65.9 HAIR LOSS: ICD-10-CM

## 2023-11-17 LAB
ALBUMIN SERPL-MCNC: 3.3 G/DL (ref 3.4–5)
ALBUMIN/GLOB SERPL: 0.8 {RATIO} (ref 1–2)
ALP LIVER SERPL-CCNC: 121 U/L
ALT SERPL-CCNC: 51 U/L
ANION GAP SERPL CALC-SCNC: 7 MMOL/L (ref 0–18)
AST SERPL-CCNC: 34 U/L (ref 15–37)
BASOPHILS # BLD AUTO: 0.08 X10(3) UL (ref 0–0.2)
BASOPHILS NFR BLD AUTO: 1.1 %
BILIRUB SERPL-MCNC: 0.4 MG/DL (ref 0.1–2)
BUN BLD-MCNC: 9 MG/DL (ref 9–23)
CALCIUM BLD-MCNC: 9 MG/DL (ref 8.5–10.1)
CHLORIDE SERPL-SCNC: 104 MMOL/L (ref 98–112)
CHOLEST SERPL-MCNC: 178 MG/DL (ref ?–200)
CO2 SERPL-SCNC: 24 MMOL/L (ref 21–32)
CREAT BLD-MCNC: 0.68 MG/DL
EGFRCR SERPLBLD CKD-EPI 2021: 106 ML/MIN/1.73M2 (ref 60–?)
EOSINOPHIL # BLD AUTO: 0.17 X10(3) UL (ref 0–0.7)
EOSINOPHIL NFR BLD AUTO: 2.3 %
ERYTHROCYTE [DISTWIDTH] IN BLOOD BY AUTOMATED COUNT: 13.3 %
EST. AVERAGE GLUCOSE BLD GHB EST-MCNC: 303 MG/DL (ref 68–126)
GLOBULIN PLAS-MCNC: 4 G/DL (ref 2.8–4.4)
GLUCOSE BLD-MCNC: 241 MG/DL (ref 70–99)
HBA1C MFR BLD: 12.2 % (ref ?–5.7)
HCT VFR BLD AUTO: 43.6 %
HDLC SERPL-MCNC: 32 MG/DL (ref 40–59)
HGB BLD-MCNC: 14.3 G/DL
IMM GRANULOCYTES # BLD AUTO: 0.03 X10(3) UL (ref 0–1)
IMM GRANULOCYTES NFR BLD: 0.4 %
LDLC SERPL CALC-MCNC: 106 MG/DL (ref ?–100)
LYMPHOCYTES # BLD AUTO: 2.48 X10(3) UL (ref 1–4)
LYMPHOCYTES NFR BLD AUTO: 32.9 %
MCH RBC QN AUTO: 28.5 PG (ref 26–34)
MCHC RBC AUTO-ENTMCNC: 32.8 G/DL (ref 31–37)
MCV RBC AUTO: 86.9 FL
MONOCYTES # BLD AUTO: 0.52 X10(3) UL (ref 0.1–1)
MONOCYTES NFR BLD AUTO: 6.9 %
NEUTROPHILS # BLD AUTO: 4.25 X10 (3) UL (ref 1.5–7.7)
NEUTROPHILS # BLD AUTO: 4.25 X10(3) UL (ref 1.5–7.7)
NEUTROPHILS NFR BLD AUTO: 56.4 %
NONHDLC SERPL-MCNC: 146 MG/DL (ref ?–130)
OSMOLALITY SERPL CALC.SUM OF ELEC: 287 MOSM/KG (ref 275–295)
PLATELET # BLD AUTO: 283 10(3)UL (ref 150–450)
POTASSIUM SERPL-SCNC: 4.1 MMOL/L (ref 3.5–5.1)
PROT SERPL-MCNC: 7.3 G/DL (ref 6.4–8.2)
RBC # BLD AUTO: 5.02 X10(6)UL
SODIUM SERPL-SCNC: 135 MMOL/L (ref 136–145)
TRIGL SERPL-MCNC: 231 MG/DL (ref 30–149)
TSI SER-ACNC: 2.56 MIU/ML (ref 0.36–3.74)
VLDLC SERPL CALC-MCNC: 39 MG/DL (ref 0–30)
WBC # BLD AUTO: 7.5 X10(3) UL (ref 4–11)

## 2023-11-17 PROCEDURE — 84443 ASSAY THYROID STIM HORMONE: CPT

## 2023-11-17 PROCEDURE — 85025 COMPLETE CBC W/AUTO DIFF WBC: CPT

## 2023-11-17 PROCEDURE — 36415 COLL VENOUS BLD VENIPUNCTURE: CPT

## 2023-11-17 PROCEDURE — 80061 LIPID PANEL: CPT

## 2023-11-17 PROCEDURE — 80053 COMPREHEN METABOLIC PANEL: CPT

## 2023-11-17 PROCEDURE — 83036 HEMOGLOBIN GLYCOSYLATED A1C: CPT

## 2023-11-17 PROCEDURE — 84410 TESTOSTERONE BIOAVAILABLE: CPT

## 2023-11-20 ENCOUNTER — TELEPHONE (OUTPATIENT)
Dept: FAMILY MEDICINE CLINIC | Facility: CLINIC | Age: 48
End: 2023-11-20

## 2023-11-20 ENCOUNTER — OFFICE VISIT (OUTPATIENT)
Dept: FAMILY MEDICINE CLINIC | Facility: CLINIC | Age: 48
End: 2023-11-20
Payer: COMMERCIAL

## 2023-11-20 VITALS
TEMPERATURE: 98 F | RESPIRATION RATE: 18 BRPM | OXYGEN SATURATION: 98 % | BODY MASS INDEX: 41.35 KG/M2 | DIASTOLIC BLOOD PRESSURE: 72 MMHG | HEART RATE: 92 BPM | WEIGHT: 219 LBS | HEIGHT: 61 IN | SYSTOLIC BLOOD PRESSURE: 128 MMHG

## 2023-11-20 DIAGNOSIS — I10 PRIMARY HYPERTENSION: ICD-10-CM

## 2023-11-20 DIAGNOSIS — E66.01 CLASS 3 SEVERE OBESITY DUE TO EXCESS CALORIES WITH SERIOUS COMORBIDITY AND BODY MASS INDEX (BMI) OF 40.0 TO 44.9 IN ADULT (HCC): ICD-10-CM

## 2023-11-20 DIAGNOSIS — E11.9 NEW ONSET TYPE 2 DIABETES MELLITUS (HCC): Primary | ICD-10-CM

## 2023-11-20 LAB
BILIRUBIN: NEGATIVE
GLUCOSE (URINE DIPSTICK): NEGATIVE MG/DL
LEUKOCYTES: NEGATIVE
MULTISTIX LOT#: NORMAL NUMERIC
NITRITE, URINE: NEGATIVE
OCCULT BLOOD: NEGATIVE
PH, URINE: 6.5 (ref 4.5–8)
PROTEIN (URINE DIPSTICK): NEGATIVE MG/DL
SPECIFIC GRAVITY: 1.02 (ref 1–1.03)
URINE-COLOR: YELLOW
UROBILINOGEN,SEMI-QN: 0.2 MG/DL (ref 0–1.9)

## 2023-11-20 RX ORDER — LISINOPRIL AND HYDROCHLOROTHIAZIDE 25; 20 MG/1; MG/1
1 TABLET ORAL EVERY MORNING
Qty: 90 TABLET | Refills: 0 | Status: SHIPPED | OUTPATIENT
Start: 2023-11-20

## 2023-11-20 RX ORDER — METFORMIN HYDROCHLORIDE 500 MG/1
500 TABLET, EXTENDED RELEASE ORAL 2 TIMES DAILY WITH MEALS
Qty: 60 TABLET | Refills: 2 | Status: SHIPPED | OUTPATIENT
Start: 2023-11-20

## 2023-11-20 RX ORDER — LISINOPRIL 10 MG/1
10 TABLET ORAL 2 TIMES DAILY
Qty: 180 TABLET | Refills: 0 | Status: SHIPPED | OUTPATIENT
Start: 2023-11-20

## 2023-11-20 NOTE — TELEPHONE ENCOUNTER
Late Entry- paged by patient on 11/18- concerned about Glu and A1C of 12.2%. Inquiring if need for ER. Discussed with pt that PCM would further discuss POC at upcoming appt. Has been juicing more and trying to be healthy but it appears this has included a lot of fruit. Advised of sugar content in fruit. Recc staying hydrated and min excess sugar in diet for now.    Delayne Keep

## 2023-11-21 ENCOUNTER — PATIENT MESSAGE (OUTPATIENT)
Dept: FAMILY MEDICINE CLINIC | Facility: CLINIC | Age: 48
End: 2023-11-21

## 2023-11-30 ENCOUNTER — PATIENT MESSAGE (OUTPATIENT)
Dept: FAMILY MEDICINE CLINIC | Facility: CLINIC | Age: 48
End: 2023-11-30

## 2023-12-01 NOTE — TELEPHONE ENCOUNTER
From: Filomena Gregory  To: Bonnie Hernandez  Sent: 11/30/2023 9:18 AM CST  Subject: Questions about medication and other    Hello, Dr. Emy Mary. I had some concerns with the dosage of metformin I am taking. I did the first week of 500 mg once a day as you instructed me. I felt fine. On Monday, I started taking one pill in the morning and one in the evening. On Tuesday, I felt very sick with the two pills. I started to feel lightheaded, dizzy, the room was spinning, I felt like I needed to vomit, and I didn't feel like myself that Tues. evening. Yesterday, I went back to one pill, but I took it at 1pm, so it could be for the entire day, and I felt better. Is it ok to do the 500mg once a day, or do you still want me to do two pills a day? I am managing my sugar and carbs with my ty, and I made my appointment with the Diabetic nutritionist for Dec. 12th, her first available. Another question. When I made the appt for the nutritionist, they said I had an appt with you on Tues. Dec. 19th for a 6mo follow-up. Did you say you cancelled that since I will see you in Feb? Will I still have my blood pressure refills ready without this Dec. 19th appt? Finally, I am still experiencing some abdominal discomfort. It comes and goes. Could I get a referral for an ultrasound for that area? It's my top abdomen where my liver and pancreas are. Thank you.   Filomena Mary

## 2023-12-05 ENCOUNTER — TELEPHONE (OUTPATIENT)
Dept: FAMILY MEDICINE CLINIC | Facility: CLINIC | Age: 48
End: 2023-12-05

## 2023-12-12 ENCOUNTER — DIABETIC EDUCATION (OUTPATIENT)
Dept: ENDOCRINOLOGY CLINIC | Facility: CLINIC | Age: 48
End: 2023-12-12
Payer: COMMERCIAL

## 2023-12-12 DIAGNOSIS — E11.9 NEW ONSET TYPE 2 DIABETES MELLITUS (HCC): Primary | ICD-10-CM

## 2023-12-12 PROCEDURE — 97802 MEDICAL NUTRITION INDIV IN: CPT | Performed by: DIETITIAN, REGISTERED

## 2023-12-12 NOTE — PROGRESS NOTES
Jewel Severance Mares 6/16/1975 was seen for individual Diabetic Medical Nutrition Therapy- an initial consult:    Date: 12/12/2023   Referral: Ke Craft MD Start time 2:35 pm  End time: 3:40 pm    Patient referred by PCP for medical nutrition therapy for new onset diabetes. Notes she is scheduled to take another A1c test tomorrow to check for accuracy. States her A1c had jumped from 6% to 12% in about a year, did not have any occurrence of symptoms related to diabetes. Family history includes mother with diabetes. Notes when increasing her dose of Metformin to taking twice/day she started having occurrence of migraines for 10-12 hours for 5 days. Has since stopped taking due to side effects. States she has completely changed her diet and has been tracking meals/snacks on [employer] ty (for about 1 month now) - Macros/day: 30 grams of sugar (mainly coming from fresh fruit), carbs 30g/meal, aiming for sodium <1500 mg, fat 30g or less. Notes she has been trying to include a lot of vegetables. Averaging about 5008-6431 brie/day, snacks range 394 brie/day, about 300-350 brie/day for meals. Was previously following Medical Medium diet to help with symptoms of psoriasis (Was about 180 g carbs/day) - mainly fruits, vegetables, no dairy, gluten, caffeine, no red meat or pork. Anthropometrics:   Wt Readings from Last 6 Encounters:   11/20/23 219 lb   08/10/23 220 lb   06/21/23 221 lb   10/07/22 222 lb   08/23/22 223 lb   08/22/22 221 lb 12.8 oz         Current diabetes medications:  Oral:  Metformin 500 mg twice/day - stopped due to side effects      Labs:  Lab Results   Component Value Date    A1C 12.2 (H) 11/17/2023    A1C 6.4 (H) 04/21/2021    CHOLEST 178 11/17/2023    CHOLEST 160 04/21/2021     (H) 11/17/2023    LDL 84 04/21/2021    HDL 32 (L) 11/17/2023    HDL 43 04/21/2021    NONHDLC 146 (H) 11/17/2023    [place] 117 04/21/2021    TRIG 231 (H) 11/17/2023    TRIG 164 (H) 04/21/2021    BUN 9 11/17/2023 BUN 11 04/07/2022    CREATSERUM 0.68 11/17/2023    CREATSERUM 0.50 (L) 04/07/2022    GFRNAA 115 04/07/2022    GFRNAA 103 11/08/2021    GFRAA 132 04/07/2022    GFRAA 118 11/08/2021       Lab Results   Component Value Date    A1C 12.2 (H) 11/17/2023    A1C 6.4 (H) 04/21/2021    A1C 6.0 (H) 12/17/2018         Glucose testing at home:   Not currently testing, does not have a glucose meter. Diet History:  Usually eats 3 meals/day and snacks. Yesterday total carbs was 187 grams/day, normal day is about 153-160 grams/day.    (a.m.) Today had unsweetened almond milk, 1 tsp coffee, 1 tbl Silk non-dairy creamer, 1 tsp coconut cream, raspberries, 1 piece of toast (vegan, gluten free -1 g carbs/piece, 50 brie), 1/2 tsp peanut butter  (mid-day) Today had sandwich (vegan, gluten free - 2 slices -374 brie, 19 grams carbs, 1 g sugar, protein 7g) with carrot, 6 lays lightly salted potato chips  (p.m.) Yesterday ate baked coconut shrimp with 4 oz boiled gold potatoes, 11 blancas of asparagus  (snacks) diet hot chocolate, half piece of toast with peanut butter  (Beverages) Mainly water, sometimes has diet hot chocolate (3 grams sugar, has about half), sometimes has celery juice  Snack can be between lunch and dinner or right before walking in the evening. Physical Activity: Walking  Averages about 150 minutes of activity/week. Uses walking pad - 20 mins in day and 20 mins in evening (about 2.2). Has been using walking pad for 5 days so far, just received. Nutrition Diagnosis  PES: Altered nutrition related laboratory values related to knowledge deficit as evidenced by elevated HBA1c      Intervention  -Comprehensive Nutrition Education Provided:  Reviewed carbohydrate counting and label reading. Recommended carbohydrate targets of 30 grams at meals and 15 grams at snacks (aiming for 150 grams carbs/day). Provided suggestions for carbohydrate controlled snacks.   Reviewed the MyPlate method with focus on balanced macronutrient consumption; identifying foods that are carbohydrates, lean protein, non-starchy vegetables, and heart healthy fats. Patient appears to have made changes to reduce carb intake with meals and includes generally balanced meals, may benefit from including more protein.       -Education on Increased Physical Activity:  discussed how increased physical activity improves insulin resistance, blood glucose control, and heart health  discussed including walking after meal that may be higher in carbs/includes more fruit    Monitoring/Evaluation  Diet modification/understanding  Hemoglobin A1c      Recommendations  Practice healthful eating patterns, emphasizing a variety of nutrient dense foods in appropriate portion sizes. Monitor carbohydrate intake with the MyPlate method   Practice carbohydrate counting and label reading  Continue walking for activity to assist with blood sugar management    Patient Specific Goals:  Aim to follow a meal plan with a consistent amount of carbohydrates for meals and snacks:  Goal for meals is 30 grams of carbohydrates for each meal (3 meals per day)  Goal for snacks is 15 grams of carbohydrates for each snack (2-3 per day)     Aim to include carbohydrates plus protein with meals and snacks     You can also use the Plate Method as a model for your meals:  1/2 of your plate is non-starchy vegetables, 1/4 of your plate is lean protein, 1/4 of your plate is starchy or carbohydrate foods      Blood Glucose Goals (if using glucose meter in the future)  Blood sugar goals: less than 130 mg/dl in the morning (fasting) and less than 180 mg/dl 2 hours after meals. Keep glucose tabs or fast acting carbohydrates with you for treatment of lows; for blood glucose levels less than 70 mg/dl, take 15 grams of fast acting carbohydrates (3-4 glucose tabs, 4 ounces of juice, or as discussed). Retest in 15 min. If not above 70 mg/dl, retreat. Follow up:  Follow up with dietitian as needed, pending results of A1c tests.       Vee Lim, RDN, LDN, Dom Hutchison

## 2023-12-12 NOTE — PATIENT INSTRUCTIONS
Goals to work towards:    Aim to follow a meal plan with a consistent amount of carbohydrates for meals and snacks:  Goal for meals is 30 grams of carbohydrates for each meal (3 meals per day)  Goal for snacks is 15 grams of carbohydrates for each snack (2-3 per day)    Aim to include carbohydrates plus protein with meals and snacks    You can also use the Plate Method as a model for your meals:  1/2 of your plate is non-starchy vegetables, 1/4 of your plate is lean protein, 1/4 of your plate is starchy or carbohydrate foods      Blood Glucose Goals  Blood sugar goals: less than 130 mg/dl in the morning (fasting) and less than 180 mg/dl 2 hours after meals. Keep glucose tabs or fast acting carbohydrates with you for treatment of lows; for blood glucose levels less than 70 mg/dl, take 15 grams of fast acting carbohydrates (3-4 glucose tabs, 4 ounces of juice, or as discussed). Retest in 15 min. If not above 70 mg/dl, retreat.

## 2023-12-13 ENCOUNTER — LAB ENCOUNTER (OUTPATIENT)
Dept: LAB | Age: 48
End: 2023-12-13
Attending: FAMILY MEDICINE
Payer: COMMERCIAL

## 2023-12-13 DIAGNOSIS — R73.03 PREDIABETES: ICD-10-CM

## 2023-12-13 DIAGNOSIS — E11.9 NEW ONSET TYPE 2 DIABETES MELLITUS (HCC): ICD-10-CM

## 2023-12-13 DIAGNOSIS — L65.9 HAIR LOSS: ICD-10-CM

## 2023-12-13 DIAGNOSIS — E55.9 VITAMIN D DEFICIENCY: ICD-10-CM

## 2023-12-13 DIAGNOSIS — Z00.00 LABORATORY EXAM ORDERED AS PART OF ROUTINE GENERAL MEDICAL EXAMINATION: ICD-10-CM

## 2023-12-13 LAB
EST. AVERAGE GLUCOSE BLD GHB EST-MCNC: 223 MG/DL (ref 68–126)
HBA1C MFR BLD: 9.4 % (ref ?–5.7)

## 2023-12-13 PROCEDURE — 3046F HEMOGLOBIN A1C LEVEL >9.0%: CPT | Performed by: FAMILY MEDICINE

## 2023-12-13 PROCEDURE — 83036 HEMOGLOBIN GLYCOSYLATED A1C: CPT

## 2023-12-13 PROCEDURE — 82652 VIT D 1 25-DIHYDROXY: CPT

## 2023-12-13 PROCEDURE — 84410 TESTOSTERONE BIOAVAILABLE: CPT

## 2023-12-13 PROCEDURE — 36415 COLL VENOUS BLD VENIPUNCTURE: CPT

## 2023-12-15 LAB — VIT D 1,25 DI-OH: 35.1 PG/ML

## 2023-12-19 ENCOUNTER — OFFICE VISIT (OUTPATIENT)
Dept: FAMILY MEDICINE CLINIC | Facility: CLINIC | Age: 48
End: 2023-12-19
Payer: COMMERCIAL

## 2023-12-19 VITALS
DIASTOLIC BLOOD PRESSURE: 80 MMHG | BODY MASS INDEX: 40.4 KG/M2 | SYSTOLIC BLOOD PRESSURE: 120 MMHG | TEMPERATURE: 97 F | HEART RATE: 80 BPM | WEIGHT: 214 LBS | HEIGHT: 61 IN | RESPIRATION RATE: 18 BRPM | OXYGEN SATURATION: 98 %

## 2023-12-19 DIAGNOSIS — E11.65 UNCONTROLLED DIABETES MELLITUS WITH HYPERGLYCEMIA, WITHOUT LONG-TERM CURRENT USE OF INSULIN (HCC): Primary | ICD-10-CM

## 2023-12-19 DIAGNOSIS — R10.10 PAIN OF UPPER ABDOMEN: ICD-10-CM

## 2023-12-19 PROCEDURE — 99214 OFFICE O/P EST MOD 30 MIN: CPT | Performed by: FAMILY MEDICINE

## 2023-12-19 PROCEDURE — 3079F DIAST BP 80-89 MM HG: CPT | Performed by: FAMILY MEDICINE

## 2023-12-19 PROCEDURE — 3074F SYST BP LT 130 MM HG: CPT | Performed by: FAMILY MEDICINE

## 2023-12-19 PROCEDURE — 3008F BODY MASS INDEX DOCD: CPT | Performed by: FAMILY MEDICINE

## 2023-12-22 ENCOUNTER — HOSPITAL ENCOUNTER (OUTPATIENT)
Dept: ULTRASOUND IMAGING | Age: 48
Discharge: HOME | End: 2023-12-22
Attending: FAMILY MEDICINE
Payer: COMMERCIAL

## 2023-12-22 DIAGNOSIS — R10.10 PAIN OF UPPER ABDOMEN: ICD-10-CM

## 2023-12-22 LAB
SEX HORM BIND GLOB: 54.6 NMOL/L
TESTOST % FREE+WEAK BND: 10.1 %
TESTOST FREE+WEAK BND: 3.8 NG/DL
TESTOSTERONE TOT /MS: 37.7 NG/DL

## 2023-12-22 PROCEDURE — 76700 US EXAM ABDOM COMPLETE: CPT | Performed by: FAMILY MEDICINE

## 2023-12-28 ENCOUNTER — PATIENT MESSAGE (OUTPATIENT)
Dept: FAMILY MEDICINE CLINIC | Facility: CLINIC | Age: 48
End: 2023-12-28

## 2023-12-28 NOTE — TELEPHONE ENCOUNTER
Please advise on labs, Patient has appointment in February for physical.     Future Appointments   Date Time Provider Fe Patel   2/2/2024 11:00 AM TYRONE Martinez [facility]   2/23/2024 11:20 AM August Hernandez MD [facility]

## 2024-02-02 ENCOUNTER — OFFICE VISIT (OUTPATIENT)
Facility: CLINIC | Age: 49
End: 2024-02-02
Payer: COMMERCIAL

## 2024-02-02 VITALS
BODY MASS INDEX: 40 KG/M2 | SYSTOLIC BLOOD PRESSURE: 122 MMHG | HEART RATE: 67 BPM | WEIGHT: 214 LBS | DIASTOLIC BLOOD PRESSURE: 70 MMHG | OXYGEN SATURATION: 98 %

## 2024-02-02 DIAGNOSIS — L65.9 HAIR LOSS: ICD-10-CM

## 2024-02-02 DIAGNOSIS — E11.65 TYPE 2 DIABETES MELLITUS WITH HYPERGLYCEMIA, WITHOUT LONG-TERM CURRENT USE OF INSULIN (HCC): Primary | ICD-10-CM

## 2024-02-02 PROCEDURE — 3078F DIAST BP <80 MM HG: CPT

## 2024-02-02 PROCEDURE — 99215 OFFICE O/P EST HI 40 MIN: CPT

## 2024-02-02 PROCEDURE — 3074F SYST BP LT 130 MM HG: CPT

## 2024-02-02 RX ORDER — LISINOPRIL AND HYDROCHLOROTHIAZIDE 25; 20 MG/1; MG/1
1 TABLET ORAL EVERY MORNING
COMMUNITY
Start: 2024-02-02

## 2024-02-02 NOTE — PATIENT INSTRUCTIONS
Plan:  - continue your nutritional changes  - aim for increased compound exercises - squats, rows - search info on compound exercise  - oziel jael - youtube     Medications:   - ok for no medication  HOW TO TREAT LOW BLOOD SUGAR (Hypoglycemia)  Low blood sugar= Less than 70, or if you start to have symptoms (below)  Symptoms: Shaking or trembling, fast heart rate, extreme hunger, sweating, confusion/difficulty concentrating, dizziness.    How to treat a low blood sugar if you are able to eat/drink: The Rule of 15  If you are using continuous glucose monitor that says you are low, but you do not have any symptoms, verify on fingerstick that your blood sugar is actually low before treating.   Eat 15 grams of carbs (see examples below)  Check your blood sugar after 15 minutes. If it’s still below your target range, have another serving.   Repeat these steps until it’s in your target range. Once it’s in range, if you're nervous about your sugar going low again, have a protein source (ie, a spoonful of peanut butter).   If you have a CGM you want to look for how your arrow has changed. If you arrow is pointed up or sideways after 15 min, give your CGM more time OR check with a finger stick. Try not to eat more food until at least 15 min after the first BG check - otherwise you risk having a rebound high.  If you are experiencing symptoms and you are unable to check your blood glucose for any reason, treat the hypoglycemia.  If someone has a low blood sugar and is unconscious: Don’t hesitate to call 911. If someone is unconscious and glucagon is not available or someone does not know how to use it, call 911 immediately.    To treat a low, I recommend you carry with you easy, pre-portioned treatment for low blood sugars that are 15G of carbs:   - Children sized squeeze pouch applesauce (high fiber + carbs help prevent too high of a spike)  - Small children's sized juicebox- 15g carb --> 4oz juice box  - Glucose  tablets from [facility], you can find them near diabetes supplies --> Note, you will need to eat 3-4 tablets to get to 15g of carbs  - Children sized fruit snack pack- look for one with 15 grams of total carbohydrate  - 3-4 Starburst candies  - 1 pack of fun sized skittles  - Choice of how to treat your low is important. Complex carbs, or foods that containf ats along with carbs (like chocolate) can slow the absorption of glucose and should NOT be used to treat an emergency low    NAVIGATING INSURANCE / PRICING OF MEDICATIONS WITH DIABETES:  Diabetes medications and supplies can be costly. The best way to reduce costs relating to your diabetes is to contact your insurance at the start of the year directly.  If you have questions about the pricing of any of your diabetes supplies or medications, please reach out directly to your insurance pharmacy plan (phone number on the back of your card), or look online at their website and their 'formulary'.   Then ask the following questions:    1.    What is the formulary preferred brand of my medication / supply (ie: my doctor ordered Ozempic, is that preferred or is another agent preferred)? Do you prefer a generic over brand name?  2.    What quantity is preferred by my plan? (30 or 90 day supply)  3.    Will my price be lower if I use a specific local or mail order pharmacy? (For example, [facility] or [facility] is sometimes preferred, or a mail order service like [facility] or [facility])  4.    What is my estimated pricing for these medications / supplies if all rules are followed?  5.     Do I have a deductible I have to meet before my medications will be fully covered? What is my deductible? (In that case, you often will pay full price until you reach your deductible)    For CGM's (Dexcom or Eri) specifically, if your pharmacy plan states that they do NOT offer coverage/there is a high copay at your pharmacy, ask to be transferred to your plans Durable Medical  Equipment team, or to be give a direct contact number for that team if they cannot transfer.  Your plan may prefer that your CGM goes through a medical supplier rather than pharmacy coverage.  If this is the case, please ask the following questions:    1.    Which durable medical equipment suppliers do you have a contract with for CGMs specifically (not all DME's supply CGMs!)  2.    What is the estimated pricing for my CGM supplies through medical coverage?        Diabetes annual care reminders:  - If you haven't, remember to complete a yearly diabetes eye exam. This exam is critical to preventing and treating eye conditions caused by diabetes that could potentially lead to vision loss. Once complete, please call your eye care provider and ask them to fax your latest report to our office. This will help us update our records. Our fax number is: [phone]  - If you ever have an upcoming surgery, please notify the office. We will make adjustments to your diabetes medications prior to the procedure.    Diet & Exercise:  Helpful apps for Carb Counting: remember, eating at low carb goal is 30-45g, or more normal carb goal of 45-60g of carb per meal, plus 15-30g of carbs with snacks.   Increase your protein (aim for 25-30g per meal, roughly) and increase your fiber intake to feel ruiz, longer.  Fiber can also be beneficial for gut health and cholesterol.  If you want more dietary guidance, let me know and we can get you scheduled with Mariana, our diabetes educator or a dietician who specializes in diabetes  MyFitnessPal- Calorie counter and diet tracker, Cubbying food search ty or www.calorieking.com, LoseIt!, Carb Manager  Nutrition analyzer: Copy and paste any recipe into this analyzer, it will give you an estimated carb count for homemade recipes:  https://www.Campus Explorer.Cignifi/recipe-nutrition-analyzer-1942428    General exercise guidelines from the [employer]:  Recommendations for Adults: Get at  least 150 minutes per week of moderate-intensity aerobic activity or 75 minutes per week of vigorous aerobic activity, or a combination of both, preferably spread throughout the week.  Add moderate- to high-intensity muscle-strengthening activity (such as resistance or weights) on at least 2 days per week.  Spend less time sitting. Even light-intensity activity can offset some of the risks of being sedentary. Taking a 10-15 minute walk after meals is very beneficial to blood sugar regulation.  Increase amount and intensity of exercise gradually over time.      Thank you for visiting our office. We look forward to working with you to reach your health goals. As a reminder, if you need refills, please request early so there is enough time to process the request. We ask that you provide at least 5 days' notice before a refill is due, so there is time to send a request to pharmacy, process the refill, and ensure there are no other problems with obtaining the medication (backorders, prior authorization paperwork, etc). Routine refills will not be addressed on weekends, so please submit these requests during the week.

## 2024-02-02 NOTE — PROGRESS NOTES
[facility] Note    Name: Brigitte Gregory    Date: 2/2/2024    HISTORY OF PRESENT ILLNESS   Brigitte Gregory is a 48 year old female with PMHx significant for DM2, carpal tunnel syndrome, Endometriosis s/p hysterectomy, migraines, HTN, obesity, psoriasis (2003), pancreatitis (2006), and vertigo.    Subjective:    Initial HPI consult in January 2024  Here for DM from PCP for newly dx'd T2DM, A1C 12.2% at time of diagnosis   She trialed metformin 500mg daily x 1 week, 1G daily x 1 week - had a severe reaction  A1C in Nov 2023 was 12.2%, then in 12/2023 was 9.4%  States was asymptomatic at time of diagnosis, denies polys  Random BG in clinic 114  Hx of pancreatitis in 2006- d/t gallbladder s/p cholecystectomy, has not recurrent episode since    DM history:   Diagnosed w/ DM age: 48 (11/2023)  Pt denies hx of hospitalization for DM  Denies known fam hx of autoimmunity though mom has thyroid condition, only self for psoriasis   Family history of DM: mother- T2DM  Diet: Previously used a 'medical medium' diet (No gluten/dairy/red meat/celery juicing) and resolved her psoriasis. She reintroduced some dairy, psoriasis re-flared. Was eating a lot of vegan or healthy options but didn't note the carb intake in those options. Since diagnosis, saw CDE at DM center,  restarted Lose It! Sharri to track carbs/protein intake.   She's eating about ~150g of carb/day, higher protein (50-60g/day)  Exercise: got a walking pad, tries to walk after a meal- 1 mile. Used to love playing tennis  DM meds at first office visit: none    Blood Glucose log/CGM: not currently checking BG at home, a bit afraid of needles    Last A1c value was 9.4% done 12/13/2023.  Previously trialed/failed DM meds: metformin- severe migraine    REVIEW OF SYSTEMS  Ten point review of systems has been performed and is otherwise negative and/or non-contributory, except as described above.     History/Other:   History  Medications:     Current Outpatient  Medications:     lisinopril-hydroCHLOROthiazide 20-25 MG Oral Tab, Take 1 tablet by mouth every morning., Disp: , Rfl:     lisinopril 10 MG Oral Tab, Take 1 tablet (10 mg total) by mouth in the morning and 1 tablet (10 mg total) before bedtime., Disp: 180 tablet, Rfl: 0    EPINEPHrine (EPIPEN 2-ALEXANDER) 0.3 MG/0.3ML Injection Solution Auto-injector, Use as directed, Disp: 1 each, Rfl: 1    triamcinolone acetonide 0.1 % External Cream, , Disp: , Rfl:      Allergies:   Allergies   Allergen Reactions    Codeine RASH    Seafood ANAPHYLAXIS and SWELLING     Angioedema      Shellfish-Derived Products ANAPHYLAXIS and RASH    Olmesartan Medoxomil-Hctz PAIN     Headaches daily, rash    Enbrel RASH    Irbesartan OTHER (SEE COMMENTS)     Headache, dizziness       Social History:   Social History     Socioeconomic History    Marital status:     Number of children: 0   Occupational History    Occupation: Professor     Employer: [employer]   Tobacco Use    Smoking status: Never    Smokeless tobacco: Never   Vaping Use    Vaping Use: Never used   Substance and Sexual Activity    Alcohol use: No    Drug use: No    Sexual activity: Not Currently     Partners: Male   Other Topics Concern    Caffeine Concern No    Occupational Exposure No    Sleep Concern No    Stress Concern Yes    Weight Concern Yes    Special Diet Yes    Exercise Yes    Seat Belt Yes    Self-Exams Yes       Medical History:   Reviewed    Surgical history:   Past Surgical History:   Procedure Laterality Date    ANGIOGRAM  2015    CHOLECYSTECTOMY  2005    laparoscopic, Dr. Mcpherson    COLONOSCOPY      HYSTERECTOMY  2019    LAPAROSCOPY PROCEDURE UNLISTED  1995    Laparoscopy    LAPAROSCOPY, SURG, W/VAGINAL HYSTERECTOMY, UTERUS >250GMS; W/REMOVE TUBE(S) &/OR OVARY(S)  08/13/2019    saranya Luna, for large and growing fibroid that proved benign       Objective:   Vitals:  Vitals:    02/02/24 1110   BP: 122/70   BP Location: Left arm   Patient Position:  Sitting   Cuff Size: adult   Pulse: 67   SpO2: 98%   Weight: 214 lb (97.1 kg)       Physical exam:  General Appearance:  alert, well developed, in no acute distress  Eyes:  normal conjunctivae, sclera  Respiratory:  breathing comfortably  Musculoskeletal:  normal muscle strength and tone  Skin:  normal moisture and skin texture  Hair & Nails:  normal scalp hair     Psychiatric:  oriented to time, self, and place  Neuro:  sensory grossly intact and motor grossly intact    Labs/Imaging: Pertinent imaging reviewed.      Assessment & Plan:     ICD-10-CM    1. Type 2 diabetes mellitus with hyperglycemia, without long-term current use of insulin (HCC)  E11.65 HemoCue Glucose 201 (Glucose blood test)      2. Hair loss  L65.9           Last A1c value was 9.4% done 12/13/2023. A1C not to goal but improving BG with lower carb diet and incr'd exercise. Limited in interpretation without glucometer readings but today's non fasted . We discussed lifestyle interventions for DM; can see her back in 3 mo to determine efficacy of lifestyle efforts.        - ok off medications  - test BG 1x/day w/ glucometer, politely declines (needle averse)  - repeat A1C in a few weeks via PCP, then follow w/ endo in 3 mo  - CI For metformin - migraines and diarrhea  - CI for GLP, hx of pancreatitis     DM quality metrics:  - Ophtho: Last Dilated Eye Exam: 12/04/23   Eye Exam shows Diabetic Retinopathy?: No  - no DR  - BP is controlled, on ACE-I  - LDL is not at goal 11/2023, not on statin, discuss guidelines next visit    - MAB needs repeat- ordered 2/2024, on ACEi  - Neuropathy/ Foot exam: No data recorded  - CAD: none    (L65.9) Hair loss  Plan: TSH previously stable, mother may have thyroid condition, advised we can test for antiTPO/Tg and repeat thyroid panel with fT4- politely declines.       Return in about 3 months (around 5/2/2024) for repeat A1C at visit.    A total of 60 minutes was spent today on obtaining history, reviewing  pertinent labs, evaluating patient, providing multiple treatment options, reinforcing diet/exercise and compliance, and completing documentation.      2/2/2024  TYRONE Pandya

## 2024-02-16 ENCOUNTER — LAB ENCOUNTER (OUTPATIENT)
Dept: LAB | Age: 49
End: 2024-02-16
Attending: FAMILY MEDICINE
Payer: COMMERCIAL

## 2024-02-16 DIAGNOSIS — E11.65 TYPE 2 DIABETES MELLITUS WITH HYPERGLYCEMIA, WITHOUT LONG-TERM CURRENT USE OF INSULIN (HCC): Primary | ICD-10-CM

## 2024-02-16 DIAGNOSIS — E11.9 NEW ONSET TYPE 2 DIABETES MELLITUS (HCC): ICD-10-CM

## 2024-02-16 LAB
ALBUMIN SERPL-MCNC: 3.7 G/DL (ref 3.4–5)
ALBUMIN/GLOB SERPL: 0.9 {RATIO} (ref 1–2)
ALP LIVER SERPL-CCNC: 87 U/L
ALT SERPL-CCNC: 25 U/L
ANION GAP SERPL CALC-SCNC: 4 MMOL/L (ref 0–18)
AST SERPL-CCNC: 13 U/L (ref 15–37)
BILIRUB SERPL-MCNC: 0.5 MG/DL (ref 0.1–2)
BUN BLD-MCNC: 13 MG/DL (ref 9–23)
CALCIUM BLD-MCNC: 8.7 MG/DL (ref 8.5–10.1)
CHLORIDE SERPL-SCNC: 106 MMOL/L (ref 98–112)
CO2 SERPL-SCNC: 26 MMOL/L (ref 21–32)
CREAT BLD-MCNC: 0.7 MG/DL
CREAT UR-SCNC: 330 MG/DL
EGFRCR SERPLBLD CKD-EPI 2021: 105 ML/MIN/1.73M2 (ref 60–?)
EST. AVERAGE GLUCOSE BLD GHB EST-MCNC: 166 MG/DL (ref 68–126)
FASTING STATUS PATIENT QL REPORTED: YES
GLOBULIN PLAS-MCNC: 4 G/DL (ref 2.8–4.4)
GLUCOSE BLD-MCNC: 141 MG/DL (ref 70–99)
HBA1C MFR BLD: 7.4 % (ref ?–5.7)
MICROALBUMIN UR-MCNC: 1.84 MG/DL
MICROALBUMIN/CREAT 24H UR-RTO: 5.6 UG/MG (ref ?–30)
OSMOLALITY SERPL CALC.SUM OF ELEC: 284 MOSM/KG (ref 275–295)
POTASSIUM SERPL-SCNC: 3.9 MMOL/L (ref 3.5–5.1)
PROT SERPL-MCNC: 7.7 G/DL (ref 6.4–8.2)
SODIUM SERPL-SCNC: 136 MMOL/L (ref 136–145)

## 2024-02-16 PROCEDURE — 3061F NEG MICROALBUMINURIA REV: CPT | Performed by: FAMILY MEDICINE

## 2024-02-16 PROCEDURE — 80053 COMPREHEN METABOLIC PANEL: CPT

## 2024-02-16 PROCEDURE — 82570 ASSAY OF URINE CREATININE: CPT

## 2024-02-16 PROCEDURE — 36415 COLL VENOUS BLD VENIPUNCTURE: CPT

## 2024-02-16 PROCEDURE — 83036 HEMOGLOBIN GLYCOSYLATED A1C: CPT

## 2024-02-16 PROCEDURE — 82043 UR ALBUMIN QUANTITATIVE: CPT

## 2024-02-16 PROCEDURE — 3051F HG A1C>EQUAL 7.0%<8.0%: CPT | Performed by: FAMILY MEDICINE

## 2024-02-23 ENCOUNTER — OFFICE VISIT (OUTPATIENT)
Dept: FAMILY MEDICINE CLINIC | Facility: CLINIC | Age: 49
End: 2024-02-23
Payer: COMMERCIAL

## 2024-02-23 VITALS
HEIGHT: 61 IN | TEMPERATURE: 98 F | BODY MASS INDEX: 39.46 KG/M2 | RESPIRATION RATE: 18 BRPM | OXYGEN SATURATION: 98 % | WEIGHT: 209 LBS | SYSTOLIC BLOOD PRESSURE: 118 MMHG | HEART RATE: 81 BPM | DIASTOLIC BLOOD PRESSURE: 72 MMHG

## 2024-02-23 DIAGNOSIS — I10 PRIMARY HYPERTENSION: ICD-10-CM

## 2024-02-23 DIAGNOSIS — Z00.00 ROUTINE GENERAL MEDICAL EXAMINATION AT A HEALTH CARE FACILITY: Primary | ICD-10-CM

## 2024-02-23 DIAGNOSIS — Z12.11 SCREENING FOR COLON CANCER: ICD-10-CM

## 2024-02-23 DIAGNOSIS — Z12.31 ENCOUNTER FOR SCREENING MAMMOGRAM FOR MALIGNANT NEOPLASM OF BREAST: ICD-10-CM

## 2024-02-23 DIAGNOSIS — E11.9 DIET-CONTROLLED TYPE 2 DIABETES MELLITUS (HCC): ICD-10-CM

## 2024-02-23 PROCEDURE — 3008F BODY MASS INDEX DOCD: CPT | Performed by: FAMILY MEDICINE

## 2024-02-23 PROCEDURE — 99396 PREV VISIT EST AGE 40-64: CPT | Performed by: FAMILY MEDICINE

## 2024-02-23 PROCEDURE — 3074F SYST BP LT 130 MM HG: CPT | Performed by: FAMILY MEDICINE

## 2024-02-23 PROCEDURE — 3078F DIAST BP <80 MM HG: CPT | Performed by: FAMILY MEDICINE

## 2024-02-23 PROCEDURE — 99213 OFFICE O/P EST LOW 20 MIN: CPT | Performed by: FAMILY MEDICINE

## 2024-02-23 RX ORDER — LISINOPRIL 10 MG/1
10 TABLET ORAL 2 TIMES DAILY
Qty: 180 TABLET | Refills: 1 | Status: SHIPPED | OUTPATIENT
Start: 2024-02-23

## 2024-02-23 RX ORDER — LISINOPRIL AND HYDROCHLOROTHIAZIDE 25; 20 MG/1; MG/1
1 TABLET ORAL EVERY MORNING
Qty: 90 TABLET | Refills: 1 | Status: SHIPPED | OUTPATIENT
Start: 2024-02-23

## 2024-02-23 NOTE — PROGRESS NOTES
Brigitte Gregory is a 48 year old female.  Chief Complaint   Patient presents with    Physical     HPI:   Brigitte Gregory is a 48 year old female with history of hypertension, diabetes and hyperlipidemia seen for her annual physical.  Patient is s/p hysterectomy.  Does do breast self exam, still has chest tenderness on the left. Due for mammogram.  Due for colonoscopy, denies any blood in stool or constipation, no family history of colon cancer.    States did follow up with endocrine, not on medication for diabetes and has been trying to control with diet and exercise  Last Diabetic Eye Exam:  Last Dilated Eye Exam: 12/04/23  Eye Exam shows Diabetic Retinopathy?: No  Will follow up with endo in 3 months.    Compliant with her blood pressure medication and low salt diet, tolerating medication well without any side effects.    States is trying to eat more protein but states when she eats cheese and eggs her psoriasis flares up.    PAST MEDICAL HISTORY:     Past Medical History:   Diagnosis Date    Acanthosis nigricans     Anxiety state     BV (bacterial vaginosis)     Carpal tunnel syndrome     Depression     situational    Diabetes (HCC) 11/23    Endometriosis, site unspecified 1996    External hemorrhoids without mention of complication     Gastritis     mild    Headache(784.0)     Helicobacter pylori (H. pylori) 03/2010    H. pylori (+)    High-density lipoprotein deficiency     \"low HDL\"    Hypertension     Obesity     Other ill-defined conditions(799.89)     \"Microcytic indices; Needs labs done for iron studies and/or thalassemia!!\"    Other psoriasis 2003    Pancreatitis (HCC)     Paresthesia     PONV (postoperative nausea and vomiting)     Unspecified essential hypertension     also hx essential HTN, benign    Unspecified pruritic disorder     Vertigo      PAST SURGICAL HISTORY:     Past Surgical History:   Procedure Laterality Date    ANGIOGRAM  2015    CHOLECYSTECTOMY  2005    laparoscopic, Dr. Mcpherson     COLONOSCOPY      HYSTERECTOMY  2019    LAPAROSCOPY PROCEDURE UNLISTED  1995    Laparoscopy    LAPAROSCOPY, SURG, W/VAGINAL HYSTERECTOMY, UTERUS >250GMS; W/REMOVE TUBE(S) &/OR OVARY(S)  08/13/2019    saranya Luna, for large and growing fibroid that proved benign     ALLERGY:     Allergies   Allergen Reactions    Codeine RASH    Seafood ANAPHYLAXIS and SWELLING     Angioedema      Shellfish-Derived Products ANAPHYLAXIS and RASH    Olmesartan Medoxomil-Hctz PAIN     Headaches daily, rash    Enbrel RASH    Irbesartan OTHER (SEE COMMENTS)     Headache, dizziness     MEDICATIONS:     Current Outpatient Medications   Medication Sig Dispense Refill    lisinopril-hydroCHLOROthiazide 20-25 MG Oral Tab Take 1 tablet by mouth every morning.      lisinopril 10 MG Oral Tab Take 1 tablet (10 mg total) by mouth in the morning and 1 tablet (10 mg total) before bedtime. 180 tablet 0    EPINEPHrine (EPIPEN 2-ALEXANDER) 0.3 MG/0.3ML Injection Solution Auto-injector Use as directed 1 each 1    triamcinolone acetonide 0.1 % External Cream        FAMILY HISTORY:     Family History   Problem Relation Age of Onset    Diabetes Mother     Other (digestive disorder) Mother         gallbladder    Other (HTN) Mother     Hypertension Mother     Other (digestive disorder) Sister         gallbladder    Other (digestive disorder) Other         gallbladder, grandmother    Diabetes Other         mother's side, aunts/uncles    Other (kidney failure) Other         aunt and uncle, d/t DM     SOCIAL HISTORY:     Social History     Socioeconomic History    Marital status:     Number of children: 0   Occupational History    Occupation: Professor     Employer: [employer]   Tobacco Use    Smoking status: Never    Smokeless tobacco: Never   Vaping Use    Vaping Use: Never used   Substance and Sexual Activity    Alcohol use: No    Drug use: No    Sexual activity: Not Currently     Partners: Male   Other Topics Concern    Caffeine Concern No     Occupational Exposure No    Sleep Concern No    Stress Concern Yes    Weight Concern Yes    Special Diet Yes    Exercise Yes    Seat Belt Yes    Self-Exams Yes     REVIEW OF SYSTEMS:   Review of Systems   Constitutional:  Negative for appetite change, fatigue, fever and unexpected weight change.   HENT:  Negative for congestion, ear pain, hearing loss and sore throat.    Eyes:  Negative for discharge, redness and visual disturbance.   Respiratory:  Negative for cough, chest tightness and shortness of breath.    Cardiovascular:  Negative for chest pain and palpitations.   Gastrointestinal:  Negative for abdominal pain, blood in stool, constipation and nausea.   Endocrine: Negative for cold intolerance, heat intolerance and polyuria.   Genitourinary:  Negative for difficulty urinating, dysuria, frequency and urgency.   Musculoskeletal:  Negative for arthralgias, gait problem, joint swelling and myalgias.   Skin:  Positive for rash (psoriasis on arms).   Allergic/Immunologic: Negative for food allergies.   Neurological:  Negative for dizziness, weakness, numbness and headaches.   Hematological:  Negative for adenopathy. Does not bruise/bleed easily.   Psychiatric/Behavioral:  Negative for dysphoric mood and sleep disturbance. The patient is not nervous/anxious.         PHYSICAL EXAM:   /72   Pulse 81   Temp 98.2 °F (36.8 °C) (Temporal)   Resp 18   Ht 5' 1\" (1.549 m)   Wt 209 lb (94.8 kg)   LMP 07/21/2019 (Exact Date)   SpO2 98%   BMI 39.49 kg/m²     Physical Exam  Constitutional:       General: She is not in acute distress.     Appearance: Normal appearance. She is well-developed. She is obese.   HENT:      Head: Normocephalic and atraumatic.      Right Ear: Tympanic membrane, ear canal and external ear normal.      Left Ear: Tympanic membrane, ear canal and external ear normal.      Nose: Nose normal.      Mouth/Throat:      Mouth: Mucous membranes are moist.      Pharynx: Oropharynx is clear.   Eyes:       Extraocular Movements: Extraocular movements intact.      Conjunctiva/sclera: Conjunctivae normal.      Pupils: Pupils are equal, round, and reactive to light.   Neck:      Thyroid: No thyromegaly.   Cardiovascular:      Rate and Rhythm: Normal rate and regular rhythm.      Pulses: Normal pulses.      Heart sounds: Normal heart sounds. No murmur heard.  Pulmonary:      Effort: Pulmonary effort is normal. No respiratory distress.      Breath sounds: Normal breath sounds.   Chest:      Chest wall: Tenderness (left chest wall) present.   Breasts:     Right: Normal. No swelling, inverted nipple, mass, nipple discharge, skin change or tenderness.      Left: Normal. No swelling, inverted nipple, mass, nipple discharge, skin change or tenderness.   Abdominal:      General: Bowel sounds are normal. There is no distension.      Palpations: Abdomen is soft. There is no mass.      Tenderness: There is no abdominal tenderness.   Musculoskeletal:         General: Normal range of motion.      Cervical back: Normal range of motion and neck supple.      Right lower leg: No edema.      Left lower leg: No edema.   Lymphadenopathy:      Cervical: No cervical adenopathy.      Upper Body:      Right upper body: No supraclavicular, axillary or pectoral adenopathy.      Left upper body: No supraclavicular, axillary or pectoral adenopathy.   Skin:     General: Skin is warm.      Findings: Rash (plaques on both arms) present.   Neurological:      General: No focal deficit present.      Mental Status: She is alert and oriented to person, place, and time.      Deep Tendon Reflexes: Reflexes are normal and symmetric.   Psychiatric:         Mood and Affect: Mood normal.         Behavior: Behavior normal.         Thought Content: Thought content normal.         Judgment: Judgment normal.        ASSESSMENT AND PLAN:   Brigitte was seen today for physical.    Diagnoses and all orders for this visit:    Routine general medical examination at a  health care facility  -     CBC W Differential W Platelet [E]; Future  -     TSH [E]; Future  -     Lipid Panel [E]; Future    Primary hypertension controlled  -     lisinopril 10 MG Oral Tab; Take 1 tablet (10 mg total) by mouth in the morning and 1 tablet (10 mg total) before bedtime.  -     lisinopril-hydroCHLOROthiazide 20-25 MG Oral Tab; Take 1 tablet by mouth every morning.  -     continue the same dose of medication  -     limit salt in diet to 1000 mg a day  -     goal BP is less than 130/80    Diet-controlled type 2 diabetes mellitus (HCC)  -     Basic Metabolic Panel (8) [E]; Future  -     Hemoglobin A1C [E]; Future  -     continue to follow up with endocrine    Screening for colon cancer  -     Occult Blood, Fecal, FIT Immunoassay (Colon Cancer Screen); Future    Encounter for screening mammogram for malignant neoplasm of breast  -     [facility] ZE 2D+3D SCREENING BILAT (CPT=77067/52134); Future    Age appropriate anticipatory guidance reviewed with patient.  Health Maintenance   Topic Date Due    Colorectal Cancer Screening  Never done    Annual Physical  Done today    Pneumococcal Vaccine: Birth to 64yrs (1 of 2 - PCV) Never done    DTaP,Tdap,and Td Vaccines (1 - Tdap) Never done    Mammogram  07/13/2018    Zoster Vaccines (1 of 2) Never done    COVID-19 Vaccine (3 - 2023-24 season) 09/01/2023    Influenza Vaccine (1) 06/30/2024 (Originally 10/1/2023)    Diabetes Care A1C  08/16/2024    Diabetes Care Dilated Eye Exam  12/04/2024    Diabetes Care Foot Exam  12/19/2024    Diabetes Care: GFR  02/16/2025    Diabetes Care: Microalb/Creat Ratio  02/16/2025    Annual Depression Screening  Completed     Return in about 6 months (around 8/23/2024) for HTN f/u.

## 2024-04-13 ENCOUNTER — PATIENT MESSAGE (OUTPATIENT)
Dept: FAMILY MEDICINE CLINIC | Facility: CLINIC | Age: 49
End: 2024-04-13

## 2024-04-18 NOTE — TELEPHONE ENCOUNTER
From: Brigitte Gregory  To: Nicolette Hernandez  Sent: 4/13/2024 8:02 PM CDT  Subject: Refill question    Hello, Dr. Manubolu. After my last visit, you mentioned that I would get refills sent for my blood pressure medication. [facility] refilled the correct amount of the one with hctz, but my two lisinopril 10mg pills I take at night only has a one month refill of 60 pills. Since I take 2 a night for 90 days, it should be 180.    Was that what was sent to [facility]?    Thank you!  Brigitte

## 2024-04-18 NOTE — TELEPHONE ENCOUNTER
Per PCP OV notes 2/23/24: Primary hypertension controlled  -     lisinopril 10 MG Oral Tab; Take 1 tablet (10 mg total) by mouth in the morning and 1 tablet (10 mg total) before bedtime.  -     lisinopril-hydroCHLOROthiazide 20-25 MG Oral Tab; Take 1 tablet by mouth every morning.  -     continue the same dose of medication  -     limit salt in diet to 1000 mg a day  -     goal BP is less than 130/80    Last refill  lisinopril 10 MG Oral Tab 180 tablet 1 2/23/2024 --   Sig:   Take 1 tablet (10 mg total) by mouth in the morning and 1 tablet (10 mg total) before bedtime.       Per dispense hx:     Dispensed Written Strength Quantity Refills Days Supply Provider Pharmacy   LISINOPRIL 10 MG TABS 04/12/2024 02/23/2024 10 Undefined 60 tablet  60 Nicolette Hernandez MD [facility] #...

## 2024-04-19 ENCOUNTER — TELEPHONE (OUTPATIENT)
Dept: FAMILY MEDICINE CLINIC | Facility: CLINIC | Age: 49
End: 2024-04-19

## 2024-04-19 NOTE — TELEPHONE ENCOUNTER
----- Message from Brigitte Gregory sent at 4/18/2024 12:02 PM CDT -----  Regarding: Refill question  Contact: [phone]  Lara, I had 3-month supply of my 10mg pills before. I also have 3-month of the combo approved.     It should not be 1month at a time.

## 2024-06-04 ENCOUNTER — LAB ENCOUNTER (OUTPATIENT)
Dept: LAB | Age: 49
End: 2024-06-04
Attending: FAMILY MEDICINE
Payer: COMMERCIAL

## 2024-06-04 DIAGNOSIS — Z00.00 ROUTINE GENERAL MEDICAL EXAMINATION AT A HEALTH CARE FACILITY: ICD-10-CM

## 2024-06-04 DIAGNOSIS — E11.9 DIET-CONTROLLED TYPE 2 DIABETES MELLITUS (HCC): ICD-10-CM

## 2024-06-04 LAB
ANION GAP SERPL CALC-SCNC: 7 MMOL/L (ref 0–18)
BASOPHILS # BLD AUTO: 0.08 X10(3) UL (ref 0–0.2)
BASOPHILS NFR BLD AUTO: 1 %
BUN BLD-MCNC: 13 MG/DL (ref 9–23)
CALCIUM BLD-MCNC: 8.9 MG/DL (ref 8.5–10.1)
CHLORIDE SERPL-SCNC: 106 MMOL/L (ref 98–112)
CHOLEST SERPL-MCNC: 173 MG/DL (ref ?–200)
CO2 SERPL-SCNC: 24 MMOL/L (ref 21–32)
CREAT BLD-MCNC: 0.81 MG/DL
EGFRCR SERPLBLD CKD-EPI 2021: 88 ML/MIN/1.73M2 (ref 60–?)
EOSINOPHIL # BLD AUTO: 0.21 X10(3) UL (ref 0–0.7)
EOSINOPHIL NFR BLD AUTO: 2.6 %
ERYTHROCYTE [DISTWIDTH] IN BLOOD BY AUTOMATED COUNT: 14.1 %
EST. AVERAGE GLUCOSE BLD GHB EST-MCNC: 166 MG/DL (ref 68–126)
FASTING PATIENT LIPID ANSWER: YES
FASTING STATUS PATIENT QL REPORTED: YES
GLUCOSE BLD-MCNC: 139 MG/DL (ref 70–99)
HBA1C MFR BLD: 7.4 % (ref ?–5.7)
HCT VFR BLD AUTO: 41.7 %
HDLC SERPL-MCNC: 36 MG/DL (ref 40–59)
HGB BLD-MCNC: 13.9 G/DL
IMM GRANULOCYTES # BLD AUTO: 0.02 X10(3) UL (ref 0–1)
IMM GRANULOCYTES NFR BLD: 0.3 %
LDLC SERPL CALC-MCNC: 104 MG/DL (ref ?–100)
LYMPHOCYTES # BLD AUTO: 2.57 X10(3) UL (ref 1–4)
LYMPHOCYTES NFR BLD AUTO: 32.3 %
MCH RBC QN AUTO: 27.4 PG (ref 26–34)
MCHC RBC AUTO-ENTMCNC: 33.3 G/DL (ref 31–37)
MCV RBC AUTO: 82.1 FL
MONOCYTES # BLD AUTO: 0.49 X10(3) UL (ref 0.1–1)
MONOCYTES NFR BLD AUTO: 6.2 %
NEUTROPHILS # BLD AUTO: 4.58 X10 (3) UL (ref 1.5–7.7)
NEUTROPHILS # BLD AUTO: 4.58 X10(3) UL (ref 1.5–7.7)
NEUTROPHILS NFR BLD AUTO: 57.6 %
NONHDLC SERPL-MCNC: 137 MG/DL (ref ?–130)
OSMOLALITY SERPL CALC.SUM OF ELEC: 286 MOSM/KG (ref 275–295)
PLATELET # BLD AUTO: 323 10(3)UL (ref 150–450)
POTASSIUM SERPL-SCNC: 4.1 MMOL/L (ref 3.5–5.1)
RBC # BLD AUTO: 5.08 X10(6)UL
SODIUM SERPL-SCNC: 137 MMOL/L (ref 136–145)
TRIGL SERPL-MCNC: 188 MG/DL (ref 30–149)
TSI SER-ACNC: 3.01 MIU/ML (ref 0.36–3.74)
VLDLC SERPL CALC-MCNC: 32 MG/DL (ref 0–30)
WBC # BLD AUTO: 8 X10(3) UL (ref 4–11)

## 2024-06-04 PROCEDURE — 83036 HEMOGLOBIN GLYCOSYLATED A1C: CPT

## 2024-06-04 PROCEDURE — 80061 LIPID PANEL: CPT

## 2024-06-04 PROCEDURE — 84443 ASSAY THYROID STIM HORMONE: CPT

## 2024-06-04 PROCEDURE — 36415 COLL VENOUS BLD VENIPUNCTURE: CPT

## 2024-06-04 PROCEDURE — 85025 COMPLETE CBC W/AUTO DIFF WBC: CPT

## 2024-06-04 PROCEDURE — 80048 BASIC METABOLIC PNL TOTAL CA: CPT

## 2024-06-06 ENCOUNTER — OFFICE VISIT (OUTPATIENT)
Facility: CLINIC | Age: 49
End: 2024-06-06
Payer: COMMERCIAL

## 2024-06-06 VITALS
OXYGEN SATURATION: 99 % | DIASTOLIC BLOOD PRESSURE: 86 MMHG | HEIGHT: 61 IN | BODY MASS INDEX: 39.65 KG/M2 | WEIGHT: 210 LBS | HEART RATE: 78 BPM | SYSTOLIC BLOOD PRESSURE: 128 MMHG

## 2024-06-06 DIAGNOSIS — E11.65 TYPE 2 DIABETES MELLITUS WITH HYPERGLYCEMIA, WITHOUT LONG-TERM CURRENT USE OF INSULIN (HCC): Primary | ICD-10-CM

## 2024-06-06 DIAGNOSIS — E11.9 NEWLY DIAGNOSED DIABETES (HCC): ICD-10-CM

## 2024-06-06 PROCEDURE — 3079F DIAST BP 80-89 MM HG: CPT

## 2024-06-06 PROCEDURE — 3008F BODY MASS INDEX DOCD: CPT

## 2024-06-06 PROCEDURE — 99213 OFFICE O/P EST LOW 20 MIN: CPT

## 2024-06-06 PROCEDURE — 3051F HG A1C>EQUAL 7.0%<8.0%: CPT

## 2024-06-06 PROCEDURE — 3074F SYST BP LT 130 MM HG: CPT

## 2024-06-06 NOTE — PROGRESS NOTES
[facility] Note    Name: Brigitte Gregory    Date: 6/6/2024    HISTORY OF PRESENT ILLNESS   Brigitte Gregory is a 48 year old female with PMHx significant for DM2, carpal tunnel syndrome, Endometriosis s/p hysterectomy, migraines, HTN, obesity, psoriasis (2003), pancreatitis (2006), and vertigo.    Subjective:    Initial HPI consult in January 2024  Here for DM from PCP for newly dx'd T2DM, A1C 12.2% at time of diagnosis   She trialed metformin 500mg daily x 1 week, 1G daily x 1 week - had a severe reaction  A1C in Nov 2023 was 12.2%, then in 12/2023 was 9.4%  States was asymptomatic at time of diagnosis, denies polys  Random BG in clinic 114  Hx of pancreatitis in 2006- d/t gallbladder s/p cholecystectomy, has not recurrent episode since    DM history:   Diagnosed w/ DM age: 48 (11/2023)  Pt denies hx of hospitalization for DM  Denies known fam hx of autoimmunity though mom has thyroid condition, only self for psoriasis   Family history of DM: mother- T2DM  Diet: Previously used a 'medical medium' diet (No gluten/dairy/red meat/celery juicing) and resolved her psoriasis. She reintroduced some dairy, psoriasis re-flared. Was eating a lot of vegan or healthy options but didn't note the carb intake in those options. Since diagnosis, saw CDE at DM center,  restarted Lose It! Sharri to track carbs/protein intake.   She's eating about ~150g of carb/day, higher protein (50-60g/day)  Exercise: got a walking pad, tries to walk after a meal- 1 mile. Used to love playing tennis  DM meds at first office visit: none    Blood Glucose log/CGM: not currently checking BG at home, a bit afraid of needles    June 2024-gabriel/ Nila HANSEN.Last A1c value was 7.4% done 6/4/2024.  Chief complaint: Diabetes (Patient presents for type 2 DM follow up. Most recent A1c 7.4 on 6/4. States she is having numbness in left arm and left leg, started about a month ago. States she is having redness in eyes past few weeks, went to see ophthalmology  yesterday and states it was allergies. States does not check blood glucose levels. )  She continues on a low sugar diet (aims for max 40g of added sugars per day) and ~1300 cals  She was disappointed her A1C stayed the same, wondering about meds vs lifestyle  Current DM meds at visit: none        Previously trialed/failed DM meds: metformin- severe migraine    REVIEW OF SYSTEMS  Ten point review of systems has been performed and is otherwise negative and/or non-contributory, except as described above.     History/Other:   History  Medications:     Current Outpatient Medications:     lisinopril 10 MG Oral Tab, Take 1 tablet (10 mg total) by mouth in the morning and 1 tablet (10 mg total) before bedtime., Disp: 180 tablet, Rfl: 1    lisinopril-hydroCHLOROthiazide 20-25 MG Oral Tab, Take 1 tablet by mouth every morning., Disp: 90 tablet, Rfl: 1    EPINEPHrine (EPIPEN 2-ALEXANDER) 0.3 MG/0.3ML Injection Solution Auto-injector, Use as directed, Disp: 1 each, Rfl: 1    triamcinolone acetonide 0.1 % External Cream, , Disp: , Rfl:     lisinopril-hydroCHLOROthiazide 20-25 MG Oral Tab, Take 1 tablet by mouth every morning., Disp: , Rfl:      Allergies:   Allergies   Allergen Reactions    Codeine RASH    Seafood ANAPHYLAXIS and SWELLING     Angioedema      Shellfish-Derived Products ANAPHYLAXIS and RASH    Olmesartan Medoxomil-Hctz PAIN     Headaches daily, rash    Enbrel RASH    Irbesartan OTHER (SEE COMMENTS)     Headache, dizziness       Social History:   Social History     Socioeconomic History    Marital status:     Number of children: 0   Occupational History    Occupation: Professor     Employer: [employer]   Tobacco Use    Smoking status: Never    Smokeless tobacco: Never   Vaping Use    Vaping status: Never Used   Substance and Sexual Activity    Alcohol use: No    Drug use: No    Sexual activity: Not Currently     Partners: Male   Other Topics Concern    Caffeine Concern No    Occupational Exposure No     Sleep Concern No    Stress Concern Yes    Weight Concern Yes    Special Diet Yes    Exercise Yes    Seat Belt Yes    Self-Exams Yes       Medical History:   Reviewed    Surgical history:   Past Surgical History:   Procedure Laterality Date    Angiogram  2015    Cholecystectomy  2005    laparoscopic, Dr. Mcpherson    Colonoscopy      Hysterectomy  2019    Laparoscopy procedure unlisted  1995    Laparoscopy    Laparoscopy, surg, w/vaginal hysterectomy, uterus >250gms; w/remove tube(s) &/or ovary(s)  08/13/2019    Jeremy, robotic, for large and growing fibroid that proved benign       Objective:   Vitals:  Vitals:    06/06/24 1501   BP: 128/86   Pulse: 78   SpO2: 99%   Weight: 210 lb (95.3 kg)   Height: 5' 1\" (1.549 m)       Physical exam:  General Appearance:  alert, well developed, in no acute distress  Eyes:  normal conjunctivae, sclera  Respiratory:  breathing comfortably  Musculoskeletal:  normal muscle strength and tone  Skin:  normal moisture and skin texture  Hair & Nails:  normal scalp hair     Psychiatric:  oriented to time, self, and place  Neuro:  sensory grossly intact and motor grossly intact    Labs/Imaging: Pertinent imaging reviewed.      Assessment & Plan:     ICD-10-CM    1. Type 2 diabetes mellitus with hyperglycemia, without long-term current use of insulin (HCC)  E11.65 OP [facility] Referral - Internal     Located within [facility] - [place] Location      2. Newly diagnosed diabetes (HCC)  E11.9           Pleasant 48 year old female with PMHx Type 2 diabetes mellitus dx'd age 48-- A1C 12.2% at the time of diagnosis. She has made lifestyle modifications and trended A1C down from 12.2--> 7.4% in 6mo. She trialed metformin and had a 'severe' reaction, prefers lifestyle over medication. We discussed meds vs lifestyle-- will refer to weight management clinic which has great dietician services, to our in-office diabetes educator, and the [facility] fitness program to align w/ patient's  lifestyle goals. If A1C fails to improve <7% we need to consider medication.   Last A1c value was 7.4% done 6/4/2024. A1C not to goal but improving BG with lower carb diet and incr'd exercise. Limited in interpretation without glucometer readings but today's non fasted . We discussed lifestyle interventions for DM; can see her back in 3 mo to determine efficacy of lifestyle efforts.        - no DM meds for now  - med fitness referral  - wt management clinic referral  - discuss T2DM initial teaching with ARISTIDES Peña (newly dx'd 2023)  - test BG 1x/day w/ glucometer, politely declines (needle averse)  - CI For metformin - migraines and diarrhea  - avoiding GLP1a initially due to pancreatitis hx but may be worthwhile to trial  - consider Eri Pro trial??    DM quality metrics:  - Ophtho: Last Dilated Eye Exam: 12/04/23   Eye Exam shows Diabetic Retinopathy?: No  - BP is controlled, on ACE-I  - LDL is not at goal 11/2023, not on statin, discuss guidelines next visit    - MAB needs repeat- ordered 2/2024, on ACEi  - Neuropathy/ Foot exam: No data recorded  - CAD: none    (L65.9) Hair loss  Plan: TSH previously stable, mother may have thyroid condition, advised we can test for antiTPO/Tg and repeat thyroid panel with fT4- politely declines.       Return in about 4 months (around 10/6/2024) for DM follow up.    A total of 22 minutes was spent today on obtaining history, reviewing pertinent labs, evaluating patient, providing multiple treatment options, reinforcing diet/exercise and compliance, and completing documentation.       6/6/2024  TYRONE Pandya

## 2024-06-06 NOTE — PATIENT INSTRUCTIONS
Follow up plan:  With TYRONE Fernandez: Return in about 3 months (around 9/6/2024) for DM follow up.  - meet with Mariana RN in 1 mo for carb aware diet/talk through lifestyle changes for diabetes    Weight management clinic: - you can call to schedule an appointment -- [phone]  [email or web address]    Gym referral: They should reach out to you  [email or web address]    Medications:   - ok to hold off on meds and continue lifestyle changes    Blood sugar targets:  Before breakfast: , 2 hours after meals: <180 (preferably <150), A1C goal: <7.0%  Time in Range goal is higher than 80% if using a continuous glucose monitor (Dexcom or Eri).  Time in Range can be found within the Dexcom G7 ty, on Clarity if using Dexcom G6, or on LibreView if using Eri.    HOW TO TREAT LOW BLOOD SUGAR (Hypoglycemia)  Low blood sugar= Less than 70, or if you start to have symptoms (below)  Symptoms: Shaking or trembling, fast heart rate, extreme hunger, sweating, confusion/difficulty concentrating, dizziness.    How to treat a low blood sugar if you are able to eat/drink: The Rule of 15  If you are using continuous glucose monitor that says you are low, but you do not have any symptoms, verify on fingerstick that your blood sugar is actually low before treating.   Eat 15 grams of carbs (see examples below)  Check your blood sugar after 15 minutes. If it’s still below your target range, have another serving.   Repeat these steps until it’s in your target range. Once it’s in range, if you're nervous about your sugar going low again, have a protein source (ie, a spoonful of peanut butter).   If you have a CGM you want to look for how your arrow has changed. If you arrow is pointed up or sideways after 15 min, give your CGM more time OR check with a finger stick. Try not to eat more food until at least 15  min after the first BG check - otherwise you risk having a rebound high.  If you are experiencing symptoms and you are unable to check your blood glucose for any reason, treat the hypoglycemia.  If someone has a low blood sugar and is unconscious: Don’t hesitate to call 911. If someone is unconscious and glucagon is not available or someone does not know how to use it, call 911 immediately.    To treat a low, I recommend you carry with you easy, pre-portioned treatment for low blood sugars that are 15G of carbs:   - Children sized squeeze pouch applesauce (high fiber + carbs help prevent too high of a spike)  - Small children's sized juicebox- 15g carb --> 4oz juice box  - Glucose tablets from [employer]/QuantConnect, you can find them near diabetes supplies --> Note, you will need to eat 3-4 tablets to get to 15g of carbs  - Children sized fruit snack pack- look for one with 15 grams of total carbohydrate  - Choice of how to treat your low is important. Complex carbs, or foods that contain fats along with carbs (like chocolate) can slow the absorption of glucose and should NOT be used to treat an emergency low    Thank you for visiting our office. We look forward to working with you to reach your health goals. As a reminder, if you need refills, please request early so there is enough time to process the request. We ask that you provide at least 5 days' notice before a refill is due, so there is time to send a request to pharmacy, process the refill, and ensure there are no other problems with obtaining the medication (backorders, prior authorization paperwork, etc). Routine refills will not be addressed on weekends, so please submit these requests during the week.

## 2024-06-28 ENCOUNTER — PATIENT MESSAGE (OUTPATIENT)
Facility: CLINIC | Age: 49
End: 2024-06-28

## 2024-06-28 ENCOUNTER — NURSE ONLY (OUTPATIENT)
Facility: CLINIC | Age: 49
End: 2024-06-28

## 2024-06-28 DIAGNOSIS — E11.65 TYPE 2 DIABETES MELLITUS WITH HYPERGLYCEMIA, WITHOUT LONG-TERM CURRENT USE OF INSULIN (HCC): Primary | ICD-10-CM

## 2024-06-28 PROCEDURE — 99211 OFF/OP EST MAY X REQ PHY/QHP: CPT

## 2024-06-28 NOTE — PROGRESS NOTES
T2DM Nutrition and Lifestyle Counseling    Patient:Brigitte Gregory  : 1975    Referred by: TYRONE Pandya  Last Office Visit: 2024    Previous Diabetes Education:  None     Recent A1C:  Last A1c value was 7.4% done 2024.     Current Diabetes Medications:  None     Blood Sugar Intake  Not currently checking     Nutrition Intake:    TYPICAL Food Notes   Breakfast  8 oz celery juice + [place] milk w/ chickory root powder and SF caramel creamer, w/ measured fruit added 1/4 tsp honey   Homemade vegan GF bread (1 slice 25 g )  Heavy metal detox smoothie (blueberries, banana, orange) - does not do now   Lunch 1/2 c mexican rice w/ 8 large shrimp, w/ salad     Dinner Cauliflower (2 c) 2.5 oz gold potatoes, will add some protein sometimes  Does not eat pork, red meat    Snacks Homemade trail mix - pumpkin seeds, walnuts, dark chocolate, 1/2 white peach   Or an almond milk chicory root latte  Snack occurs at different times of the day    Drinks Lemon water, sometimes tea SF, once a week olipop or diet soda       For psoriasis, does not eat - pork, red meat, deli meat, caffeine, dairy, gluten, eggs     Sleep Patterns:  Usually goes to bed around 11pm, then wakes up every 2 hours, wakes up for the day at 7am   No previous sleep study     Stressors:  - Not right now  - Costochondritis post COVID, 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   - Managing multiple conditions with food, and different food rules for each condition     Physical Activity:  5-7 times a week for 20-30 minutes   Activities: walking pad, some strength training, has appointment with [facility] diabetes gym program       Nutrition Topics Discussed   - Discussed basic meal planning guidelines for diabetes regular mealtime   - Defined the term macronutrient and helped patient to Identify foods that are carbohydrates, protein, non-starchy vegetables, and fats    - Reinforced the importance of carbohydrate consistency in each meal, and  importance of not skipping meals   - Recommended carbohydrate targets of 45-60 grams at meals and 15-30 grams at snacks   - Educated on label reading emphasizing most important areas of focus    - Educated on portion management; including use of measuring cups, plate visualization or food scale   - Provided suggestions for lower carb, higher protein / fiber snacks   - Discussed how to handle special occasions, dining out, and eating on the go   - Educated on emotional eating and being mindful at meals  -  Provided examples of culturally significant and typically eaten foods / food combinations for best real life application of education   - Emphasized the need for small, sustainable changes and working on SMART goals to forge long term behavior change   - Provided instructions on how to use helpful websites or nutrition tracking apps    - If applicable, taught to use carbohydrate to insulin ratio and insulin sensitivity factor     Lifestyle Topics Discussed  - Reinforced the importance of stress management and mental health support  - Discussed how to create SMART goals to help achieve milestones in diabetes care and management over time  - Recommended healthful mental tools to increase motivation for care  - Educated on the importance of intentional movement related to insulin resistance / absorption in the body, and discussed actionable ways to increase movement specific to the patient's interests  - Provided resources to connect patient with the larger diabetes community to build a greater support network     Goals:  - increase protein / fiber to meals     Follow Up:  10/2/2024    June 28, 2024  Mariana Arango RN, MSN, BC-[facility], [facility]  Diabetes Care &        A total of 60 minutes was spent with the patient including chart review, discussion and education / advisement pertinent to patient and provider specified concerns as documented above.     Note to patient: The 21 Century Cures Act makes  medical notes like these available to patients in the interest of transparency. However, be advised this is a medical document. It is intended as peer to peer communication. It is written in medical language and may contain abbreviations or verbiage that are unfamiliar. It may appear blunt or direct. Medical documents are intended to carry relevant information, facts as evident, and the clinical opinion of the practitioner.

## 2024-07-01 NOTE — TELEPHONE ENCOUNTER
From: Mariana ARMSTRONG  To: Brigitte Gregory  Sent: 6/28/2024 11:02 AM CDT  Subject: Diet Packet    Robert Briggs,    Here is the packet we went over today, please let me know if you have nay questions!    Thanks,  Mariana Arango RN, MSN, BC-[facility], [facility]  Diabetes Care &   [facility] Endocrinology [facility]   [phone] (office)  [phone] (fax)   Office Phone Hours  8:30 am - 4:30 pm, M-LONI

## 2024-07-11 ENCOUNTER — PATIENT MESSAGE (OUTPATIENT)
Facility: CLINIC | Age: 49
End: 2024-07-11

## 2024-07-11 DIAGNOSIS — E11.65 TYPE 2 DIABETES MELLITUS WITH HYPERGLYCEMIA, WITHOUT LONG-TERM CURRENT USE OF INSULIN (HCC): Primary | ICD-10-CM

## 2024-07-12 NOTE — TELEPHONE ENCOUNTER
From: Brigitte Gregory  To: Hanny Agrawal  Sent: 7/11/2024 1:31 PM CDT  Subject: A1C    DevonHanny. My 90 day A1C check-up will be in October, right before I see you for our 3-month follow-up. Will you be submitting the A1C request through my insurance? My primary said she will not be ordering any follow-up blood work for me.    Please let me know.    Also, thank you for recommending Janine bender at the [facility]. We had our first meeting, and my assessment and program starts next week. :)     Yuridia

## 2024-07-18 ENCOUNTER — NURSE TRIAGE (OUTPATIENT)
Dept: INTERNAL MEDICINE CLINIC | Facility: CLINIC | Age: 49
End: 2024-07-18

## 2024-07-18 NOTE — TELEPHONE ENCOUNTER
Brigitte Gregory  P [facility] Front [id number] minutes ago (3:10 PM)     Appointment For: Brigitte Gregory ([id number])  Visit Type: MYCHART EXAM (2964)     7/24/2024    2:40 PM  20 mins.  Nicolette Hernandez          [facility] [address] [place] [address]     Patient Comments:  I have been experiencing abdominal pain    Claudia Mercado  [facility] Front Office; [facility] Clinical Staff7 minutes ago (4:03 PM)     BS  Please triage

## 2024-07-18 NOTE — TELEPHONE ENCOUNTER
Reason for Disposition   MODERATE pain that comes and goes (cramps) lasts > 24 hours   Patient wants to be seen    Protocols used: Abdominal Pain - Upper-A-OH    Action Requested: Summary for Provider     []  Critical Lab, Recommendations Needed  [] Need Additional Advice  [x]   FYI    []   Need Orders  [] Need Medications Sent to Pharmacy  []  Other     SUMMARY: Called and spoke w/ pt. Pt c/o upper middle abdominal pain 3-4 days. Pt unsure if it's related to something she ate. Pt c/o heartburn and abdominal bloating. Stated abd feels \"hard like a basketball\". Pt stated she is doing a \"detox\" where she drinks celery juice in the morning. Pt was supposed to attend a fitness class and cancelled due to stomach not feeling right and loose/diarrhea episode. Denies any diarrhea since. Currently does not have the abd pain. Stated it's sensitive to touch. Denies fever, chest pain and vomiting. States appetite is good, ate lunch with no problems. Notified pt I am concerned about her abdomen being hard and think an UC eval would be appropriate as she may need imaging. Pt stated no and that she's not going to do that. Pt stated she is monitoring her symptoms and will go to UC/ER if things worsen. Pt stated she also ate red meat 2 times recently and previously she had eliminated this from diet.     Reason for call: Appt Request and Abdominal Pain  Onset: Data Unavailable    Future Appointments   Date Time Provider Department Center   7/24/2024  2:40 PM Nicolette Hernandez MD [facility]   10/8/2024  9:00 AM REF NO [facility] REF [id number] Ref Lab   10/9/2024 11:15 AM Hanny Agrawal APN [facility] [place]   10/9/2024  1:20 PM Nicolette Hernandez MD [facility]       FYI Dr. Hernandez- I advised UC today and pt declined. Stated she will go if symptoms worsened.

## 2024-07-24 ENCOUNTER — OFFICE VISIT (OUTPATIENT)
Dept: FAMILY MEDICINE CLINIC | Facility: CLINIC | Age: 49
End: 2024-07-24
Payer: COMMERCIAL

## 2024-07-24 VITALS
DIASTOLIC BLOOD PRESSURE: 76 MMHG | SYSTOLIC BLOOD PRESSURE: 110 MMHG | OXYGEN SATURATION: 98 % | HEIGHT: 61 IN | RESPIRATION RATE: 14 BRPM | HEART RATE: 91 BPM | BODY MASS INDEX: 40 KG/M2 | TEMPERATURE: 98 F

## 2024-07-24 DIAGNOSIS — S39.011A ABDOMINAL MUSCLE STRAIN, INITIAL ENCOUNTER: Primary | ICD-10-CM

## 2024-07-24 DIAGNOSIS — K52.9 GASTROENTERITIS: ICD-10-CM

## 2024-07-24 DIAGNOSIS — I10 PRIMARY HYPERTENSION: ICD-10-CM

## 2024-07-24 PROCEDURE — 3008F BODY MASS INDEX DOCD: CPT | Performed by: FAMILY MEDICINE

## 2024-07-24 PROCEDURE — 99213 OFFICE O/P EST LOW 20 MIN: CPT | Performed by: FAMILY MEDICINE

## 2024-07-24 PROCEDURE — 3074F SYST BP LT 130 MM HG: CPT | Performed by: FAMILY MEDICINE

## 2024-07-24 PROCEDURE — 3078F DIAST BP <80 MM HG: CPT | Performed by: FAMILY MEDICINE

## 2024-07-24 RX ORDER — LISINOPRIL 10 MG/1
10 TABLET ORAL 2 TIMES DAILY
Qty: 180 TABLET | Refills: 1 | Status: SHIPPED | OUTPATIENT
Start: 2024-07-24

## 2024-07-24 RX ORDER — LISINOPRIL AND HYDROCHLOROTHIAZIDE 25; 20 MG/1; MG/1
1 TABLET ORAL EVERY MORNING
Qty: 90 TABLET | Refills: 1 | Status: SHIPPED | OUTPATIENT
Start: 2024-07-24

## 2024-07-24 NOTE — PROGRESS NOTES
Brigitte Gregory is a 49 year old female.  Chief Complaint   Patient presents with    Abdominal Pain     Pt c/o constant upper abdominal pain. Pt was severe the first 4 days but has subsided. Pt initially had diarrhea the first day but loose stool the following days. Pt denies seeing blood in stool. Denies urinary symptoms or fever.     HPI:   Brigitte Gregory is a 49 year old female with history of hypertension and diabetes complaining of upper abdominal pain, states started after she moved a heavy wardrobe a week ago, felt a sharp pain at that time that lasted for 4 days, pain is better now.    States had loose stool and nausea that started a day after and last for a few days, symptoms are better now.    Compliant with her blood pressure medication and low salt diet, tolerating medication well without any side effects.    ALLERGY:     Allergies   Allergen Reactions    Codeine RASH    Seafood ANAPHYLAXIS and SWELLING     Angioedema      Shellfish-Derived Products ANAPHYLAXIS and RASH    Olmesartan Medoxomil-Hctz PAIN     Headaches daily, rash    Enbrel RASH    Irbesartan OTHER (SEE COMMENTS)     Headache, dizziness     MEDICATIONS:     Current Outpatient Medications   Medication Sig Dispense Refill    lisinopril 10 MG Oral Tab Take 1 tablet (10 mg total) by mouth in the morning and 1 tablet (10 mg total) before bedtime. 180 tablet 1    lisinopril-hydroCHLOROthiazide 20-25 MG Oral Tab Take 1 tablet by mouth every morning. 90 tablet 1    EPINEPHrine (EPIPEN 2-ALEXANDER) 0.3 MG/0.3ML Injection Solution Auto-injector Use as directed (Patient not taking: Reported on 7/24/2024) 1 each 1      Past Medical History:    Acanthosis nigricans    Anxiety state    BV (bacterial vaginosis)    Carpal tunnel syndrome    Depression    situational    Diabetes (HCC)    Endometriosis, site unspecified    External hemorrhoids without mention of complication    Gastritis    mild    Headache(784.0)    Helicobacter pylori (H. pylori)    H. pylori  (+)    High-density lipoprotein deficiency    \"low HDL\"    Hypertension    Obesity    Other ill-defined conditions(029.89)    \"Microcytic indices; Needs labs done for iron studies and/or thalassemia!!\"    Other psoriasis    Pancreatitis (HCC)    Paresthesia    PONV (postoperative nausea and vomiting)    Unspecified essential hypertension    also hx essential HTN, benign    Unspecified pruritic disorder    Vertigo      Social History:  Social History     Socioeconomic History    Marital status:     Number of children: 0   Occupational History    Occupation: Professor     Employer: [employer]   Tobacco Use    Smoking status: Never    Smokeless tobacco: Never   Vaping Use    Vaping status: Never Used   Substance and Sexual Activity    Alcohol use: No    Drug use: No    Sexual activity: Not Currently     Partners: Male   Other Topics Concern    Caffeine Concern No    Occupational Exposure No    Sleep Concern No    Stress Concern Yes    Weight Concern Yes    Special Diet Yes    Exercise Yes    Seat Belt Yes    Self-Exams Yes     Social Determinants of Health     Physical Activity: Inactive (4/23/2021)    [facility] [employer] [employer]    Exercise Vital Sign     Days of Exercise per Week: 0 days     Minutes of Exercise per Session: 0 min        REVIEW OF SYSTEMS:   A comprehensive 10 point review of systems was completed.  Pertinent positives and negatives noted in the the HPI.      EXAM:   /76   Pulse 91   Temp 98.4 °F (36.9 °C) (Temporal)   Resp 14   Ht 5' 1\" (1.549 m)   LMP 07/21/2019 (Exact Date)   SpO2 98%   BMI 39.68 kg/m²   GENERAL: well developed, well nourished,in no apparent distress  SKIN: no rashes,no suspicious lesions  HEENT: atraumatic, normocephalic,ears and throat are clear  NECK: supple,no adenopathy,no bruits  LUNGS: clear to auscultation  CARDIO: RRR without murmur  ABDOMEN: soft, non tender, no HSM, normal BS  EXTREMITIES: no cyanosis, clubbing  or edema    ASSESSMENT AND PLAN:   Brigitte was seen today for abdominal pain.    Diagnoses and all orders for this visit:    Abdominal muscle strain, initial encounter    Gastroenteritis resolved    Primary hypertension controlled  -     lisinopril 10 MG Oral Tab; Take 1 tablet (10 mg total) by mouth in the morning and 1 tablet (10 mg total) before bedtime.  -     lisinopril-hydroCHLOROthiazide 20-25 MG Oral Tab; Take 1 tablet by mouth every morning.  -     advised to continue the same dose of medication  -     limit salt to less than 1000 mg a day  -     Goal BP less than 130/80       The 21st Century Cures Act makes medical notes like these available to patients in the interest of transparency. Please be advised this is a medical document. Medical documents are intended to carry relevant information, facts as evident, and the clinical opinion of the practitioner. The medical note is intended as peer to peer communication and may appear blunt or direct. It is written in medical language and may contain abbreviations or verbiage that are unfamiliar.

## 2024-08-06 ENCOUNTER — PATIENT MESSAGE (OUTPATIENT)
Dept: FAMILY MEDICINE CLINIC | Facility: CLINIC | Age: 49
End: 2024-08-06

## 2024-08-07 NOTE — TELEPHONE ENCOUNTER
From: Brigitte Gregory  To: Nicolette Hernandez  Sent: 8/6/2024 10:00 PM CDT  Subject: Abdominal pain    Dr. Mary Osorio. My abdominal pain came back. It is coupled with some gas and a little acid reflux.     When I eat fruit or vegan/gf toast, it doesn't hurt as much. However, I have Diabetes, so I cannot eat too much of those foods.    Do you think I should get an ultra sound on my tummy or some other exam? Please let me know.    Thank you,  Brigitte

## 2024-11-08 ENCOUNTER — HOSPITAL ENCOUNTER (OUTPATIENT)
Age: 49
Discharge: HOME | End: 2024-11-08
Payer: COMMERCIAL

## 2024-11-08 ENCOUNTER — APPOINTMENT (OUTPATIENT)
Dept: GENERAL RADIOLOGY | Age: 49
End: 2024-11-08
Attending: NURSE PRACTITIONER
Payer: COMMERCIAL

## 2024-11-08 VITALS
SYSTOLIC BLOOD PRESSURE: 123 MMHG | DIASTOLIC BLOOD PRESSURE: 76 MMHG | WEIGHT: 212 LBS | OXYGEN SATURATION: 97 % | TEMPERATURE: 98 F | HEART RATE: 95 BPM | BODY MASS INDEX: 40 KG/M2 | RESPIRATION RATE: 26 BRPM

## 2024-11-08 DIAGNOSIS — U07.1 COVID: Primary | ICD-10-CM

## 2024-11-08 LAB
POCT INFLUENZA A: NEGATIVE
POCT INFLUENZA B: NEGATIVE
S PYO AG THROAT QL: NEGATIVE
SARS-COV-2 RNA RESP QL NAA+PROBE: DETECTED

## 2024-11-08 PROCEDURE — U0002 COVID-19 LAB TEST NON-CDC: HCPCS | Performed by: NURSE PRACTITIONER

## 2024-11-08 PROCEDURE — 87502 INFLUENZA DNA AMP PROBE: CPT | Performed by: NURSE PRACTITIONER

## 2024-11-08 PROCEDURE — 87880 STREP A ASSAY W/OPTIC: CPT | Performed by: NURSE PRACTITIONER

## 2024-11-08 PROCEDURE — 99214 OFFICE O/P EST MOD 30 MIN: CPT | Performed by: NURSE PRACTITIONER

## 2024-11-08 PROCEDURE — 71046 X-RAY EXAM CHEST 2 VIEWS: CPT | Performed by: NURSE PRACTITIONER

## 2024-11-08 RX ORDER — BENZONATATE 100 MG/1
100 CAPSULE ORAL 3 TIMES DAILY PRN
Qty: 30 CAPSULE | Refills: 0 | Status: SHIPPED | OUTPATIENT
Start: 2024-11-08 | End: 2024-12-08

## 2024-11-08 RX ORDER — IBUPROFEN 600 MG/1
600 TABLET, FILM COATED ORAL ONCE
Status: COMPLETED | OUTPATIENT
Start: 2024-11-08 | End: 2024-11-08

## 2024-11-08 NOTE — ED PROVIDER NOTES
Patient Seen in: [facility]      History     Chief Complaint   Patient presents with    Cough/URI     Stated Complaint: cough    Subjective:   This is a 49-year-old female with below stated medical history.  Presents to immediate care for 1 week of respiratory symptoms.  Reports coughing, fevers, and sore throat.  Reports chest discomfort only when coughing.  She was exposed to her nephew last Saturday who is also sick with respiratory symptoms.  No chest pain, difficulty breathing, or wheezing.  No swelling in the lower legs.  Taking multiple over-the-counter cough and cold medicines with no relief.     The history is provided by the patient.             Objective:     Past Medical History:    Acanthosis nigricans    Anxiety state    BV (bacterial vaginosis)    Carpal tunnel syndrome    Depression    situational    Diabetes (HCC)    Endometriosis, site unspecified    External hemorrhoids without mention of complication    Gastritis    mild    Headache(784.0)    Helicobacter pylori (H. pylori)    H. pylori (+)    High-density lipoprotein deficiency    \"low HDL\"    Hypertension    Obesity    Other ill-defined conditions(799.89)    \"Microcytic indices; Needs labs done for iron studies and/or thalassemia!!\"    Other psoriasis    Pancreatitis (HCC)    Paresthesia    PONV (postoperative nausea and vomiting)    Unspecified essential hypertension    also hx essential HTN, benign    Unspecified pruritic disorder    Vertigo              Past Surgical History:   Procedure Laterality Date    Angiogram  2015    Cholecystectomy  2005    laparoscopic, Dr. Mcpherson    Colonoscopy      Hysterectomy  2019    Laparoscopy procedure unlisted  1995    Laparoscopy    Laparoscopy, surg, w/vaginal hysterectomy, uterus >250gms; w/remove tube(s) &/or ovary(s)  08/13/2019    saranya Luna, for large and growing fibroid that proved benign                Social History     Socioeconomic History    Marital status:      Number of children: 0   Occupational History    Occupation: Professor     Employer: [employer]   Tobacco Use    Smoking status: Never     Passive exposure: Never    Smokeless tobacco: Never   Vaping Use    Vaping status: Never Used   Substance and Sexual Activity    Alcohol use: No    Drug use: No    Sexual activity: Not Currently     Partners: Male   Other Topics Concern    Caffeine Concern No    Occupational Exposure No    Sleep Concern No    Stress Concern Yes    Weight Concern Yes    Special Diet Yes    Exercise Yes    Seat Belt Yes    Self-Exams Yes     Social Drivers of Health     Physical Activity: Inactive (4/23/2021)    Received from [employer] [employer]    Exercise Vital Sign     Days of Exercise per Week: 0 days     Minutes of Exercise per Session: 0 min              Review of Systems   Constitutional:  Positive for chills, fatigue and fever.   HENT:  Positive for congestion and sore throat.    Respiratory:  Positive for cough. Negative for shortness of breath, wheezing and stridor.    Cardiovascular:  Negative for chest pain, palpitations and leg swelling.   Gastrointestinal:  Negative for abdominal pain, constipation, diarrhea, nausea and vomiting.   Genitourinary:  Negative for dysuria.   Musculoskeletal:  Negative for back pain, neck pain and neck stiffness.   Skin:  Negative for rash.   Neurological:  Negative for headaches.       Positive for stated complaint: cough  Other systems are as noted in HPI.  Constitutional and vital signs reviewed.      All other systems reviewed and negative except as noted above.    Physical Exam     ED Triage Vitals [11/08/24 0956]   /76   Pulse 108   Resp 26   Temp 97.7 °F (36.5 °C)   Temp src Oral   SpO2 96 %   O2 Device None (Room air)       Current Vitals:   Vital Signs  BP: 123/76  Pulse: 95  Resp: 26  Temp: 97.7 °F (36.5 °C)  Temp src: Oral    Oxygen Therapy  SpO2: 97 %  O2 Device: None (Room air)        Physical  Exam  Vitals and nursing note reviewed.   Constitutional:       General: She is not in acute distress.     Appearance: Normal appearance. She is obese. She is not ill-appearing, toxic-appearing or diaphoretic.   HENT:      Head: Normocephalic and atraumatic.      Right Ear: Tympanic membrane, ear canal and external ear normal. There is no impacted cerumen.      Left Ear: Tympanic membrane, ear canal and external ear normal. There is no impacted cerumen.      Nose: Congestion and rhinorrhea present.      Mouth/Throat:      Mouth: Mucous membranes are moist.      Pharynx: Oropharynx is clear. Posterior oropharyngeal erythema present. No oropharyngeal exudate.   Eyes:      General:         Right eye: No discharge.         Left eye: No discharge.      Extraocular Movements: Extraocular movements intact.      Conjunctiva/sclera: Conjunctivae normal.   Cardiovascular:      Rate and Rhythm: Normal rate and regular rhythm.      Heart sounds: Normal heart sounds. No murmur heard.  Pulmonary:      Effort: Pulmonary effort is normal. No respiratory distress.      Breath sounds: Normal breath sounds. No stridor. No wheezing, rhonchi or rales.   Musculoskeletal:      Cervical back: Neck supple.      Right lower leg: No edema.      Left lower leg: No edema.   Skin:     General: Skin is warm and dry.      Capillary Refill: Capillary refill takes less than 2 seconds.      Findings: No rash.   Neurological:      Mental Status: She is alert and oriented to person, place, and time.   Psychiatric:         Mood and Affect: Mood normal.         Behavior: Behavior normal.             ED Course     Labs Reviewed   RAPID SARS-COV-2 BY PCR - Abnormal; Notable for the following components:       Result Value    Rapid SARS-CoV-2 by PCR Detected (*)     All other components within normal limits   POCT RAPID STREP - Normal   POCT FLU TEST - Normal    Narrative:     This assay is a rapid molecular in vitro test utilizing nucleic acid  amplification of influenza A and B viral RNA.            Rapid COVID, rapid flu, rapid strep, chest xray       MDM      Vital signs stable. Patient is well-appearing and nontoxic looking. Presents to immediate care for upper respiratory symptoms.    Differential diagnosis include but not limited to COVID, viral sinusitis, strep, influenza, other viral URI, pneumonia     Lung sounds clear bilaterally. No respiratory distress or hypoxia.  Chest x-ray films interpreted and reviewed myself.  Results show no acute cardiopulmonary disease.  There is a stable pulmonary hamartoma.       Rapid strep and rapid flu are negative.  Rapid COVID is positive.  She is out of the window for Paxlovid therapy.    DC home. Recommended over-the-counter antihistamines and Flonase for symptoms. Tylenol and/or ibuprofen for pain or fever.  Infection precautions reviewed.  The importance of oral hydration and close follow-up with primary provider in 1 week discussed. Reasons to seek emergent treatment reinforced. Patient verbalized understanding, and agreed with plan of care. All questions answered.          Medical Decision Making      Disposition and Plan     Clinical Impression:  1. COVID         Disposition:  Discharge  11/8/2024 10:59 am    Follow-up:  Nicolette Hernandez MD  [address] [place] [id number]  [phone]    In 1 week            Medications Prescribed:  Current Discharge Medication List        START taking these medications    Details   benzonatate 100 MG Oral Cap Take 1 capsule (100 mg total) by mouth 3 (three) times daily as needed for cough.  Qty: 30 capsule, Refills: 0                 Supplementary Documentation:

## 2024-11-08 NOTE — ED INITIAL ASSESSMENT (HPI)
Pt has had a URI for 1 week,  She has a core throat and fevers on and off and cough not improving

## 2024-11-08 NOTE — DISCHARGE INSTRUCTIONS
You may take 600 mg of ibuprofen every 6 hours.  You may also take 2 extra strength Tylenol every 6 hours.  Do not take Tylenol if you are getting continue DayQuil and NyQuil because these medications have Tylenol in it.  You may also use over-the-counter antihistamines like Claritin and Zyrtec with Flonase nasal spray.  Use Tessalon Perles for coughing.  You will need to stay home until you are fever free for 24 hours with no fever reducing medications and your symptoms improved.  Follow closely with your primary care provider.  ER if worse.

## 2024-11-11 ENCOUNTER — NURSE TRIAGE (OUTPATIENT)
Dept: FAMILY MEDICINE CLINIC | Facility: CLINIC | Age: 49
End: 2024-11-11

## 2024-11-11 NOTE — TELEPHONE ENCOUNTER
Appointment changed to in office.     Patient MC active today, Mc sent informing her of change. Postponing to ensure read.

## 2024-11-11 NOTE — TELEPHONE ENCOUNTER
Action Requested: Summary for Provider     []  Critical Lab, Recommendations Needed  [x] Need Additional Advice  []   FYI    []   Need Orders  [] Need Medications Sent to Pharmacy  []  Other     SUMMARY: Dr. Hernandez - Pt is wondering if apt on 11/15 can be in person? Current apt is virtual, however she may need to provide form/letter to her work. Covid + 11/8. I advised pt to return to  today for re-eval due to SOB.     Future Appointments   Date Time Provider Department Center   11/15/2024 11:20 AM Nicolette Hernandez MD [facility]   1/6/2025  9:00 AM REF NO [facility] REF [id number] Ref Lab   1/7/2025  1:15 PM Hanny Agrawal APN [facility] [place]   1/7/2025  2:40 PM Nicolette Hernandez MD [facility]     Reason for call: Acute  Onset: Symptoms started 11/2. Seen in  11/8    Reason for Disposition   MODERATE difficulty breathing (e.g., speaks in phrases, SOB even at rest, pulse 100-120)   Patient sounds very sick or weak to the triager    Protocols used: Coronavirus (COVID-19) Persisting Symptoms Follow-up Call-[facility]    Symptoms started 11/2, Covid negative at that tome   Seen in  11/8, Covid positive. Chest xray done  Benzonatate increased her SOB so she stopped taking   Mucinex and Delsym not helping   Just took Dayquil   Coughing on the phone. Did not sound SOB on the phone  Chest hurts with coughing   Stated her costochondritis is back   Advised pt return to  for eval. Notified I am concerned with breathing and may need additional meds  Pt stated she just took Dayquil, wants to see if it helps. Will go to  if she's not better

## 2024-11-11 NOTE — TELEPHONE ENCOUNTER
Patient scheduled the following appointment thru My Chart.  Triage if needed.     Appointment for: Brigitte Gregory ([id number])   [id number]   11/15/2024 11:20 AM (20 minutes) with Nicolette Hernandez in [facility] [address] [place] [address]      Patient comments:   Covid complications

## 2024-11-15 ENCOUNTER — OFFICE VISIT (OUTPATIENT)
Dept: FAMILY MEDICINE CLINIC | Facility: CLINIC | Age: 49
End: 2024-11-15
Payer: COMMERCIAL

## 2024-11-15 VITALS
HEART RATE: 84 BPM | SYSTOLIC BLOOD PRESSURE: 114 MMHG | DIASTOLIC BLOOD PRESSURE: 62 MMHG | TEMPERATURE: 98 F | OXYGEN SATURATION: 96 % | WEIGHT: 210.13 LBS | RESPIRATION RATE: 16 BRPM | HEIGHT: 61 IN | BODY MASS INDEX: 39.67 KG/M2

## 2024-11-15 DIAGNOSIS — U07.1 COVID-19 VIRUS INFECTION: Primary | ICD-10-CM

## 2024-11-15 DIAGNOSIS — M94.0 COSTOCHONDRITIS: ICD-10-CM

## 2024-11-15 DIAGNOSIS — M79.18 ABDOMINAL MUSCLE PAIN: ICD-10-CM

## 2024-11-15 DIAGNOSIS — E11.9 DIET-CONTROLLED TYPE 2 DIABETES MELLITUS (HCC): ICD-10-CM

## 2024-11-15 PROBLEM — F33.1 MAJOR DEPRESSIVE DISORDER, RECURRENT EPISODE, MODERATE (HCC): Status: RESOLVED | Noted: 2018-10-19 | Resolved: 2024-11-15

## 2024-11-15 PROBLEM — R94.31 ABNORMAL EKG: Status: RESOLVED | Noted: 2019-08-06 | Resolved: 2024-11-15

## 2024-11-15 PROBLEM — R42 DIZZINESS: Status: RESOLVED | Noted: 2017-09-14 | Resolved: 2024-11-15

## 2024-11-15 PROBLEM — R06.09 DOE (DYSPNEA ON EXERTION): Status: RESOLVED | Noted: 2017-09-09 | Resolved: 2024-11-15

## 2024-11-15 PROBLEM — Z01.810 PREOP CARDIOVASCULAR EXAM: Status: RESOLVED | Noted: 2019-08-07 | Resolved: 2024-11-15

## 2024-11-15 PROCEDURE — 3008F BODY MASS INDEX DOCD: CPT | Performed by: FAMILY MEDICINE

## 2024-11-15 PROCEDURE — 99213 OFFICE O/P EST LOW 20 MIN: CPT | Performed by: FAMILY MEDICINE

## 2024-11-15 PROCEDURE — 3074F SYST BP LT 130 MM HG: CPT | Performed by: FAMILY MEDICINE

## 2024-11-15 PROCEDURE — 3078F DIAST BP <80 MM HG: CPT | Performed by: FAMILY MEDICINE

## 2024-11-15 NOTE — PROGRESS NOTES
Family Medicine Progress Note  ASSESSMENT AND PLAN:  Brigitte Gregory is a 49 year old female who is here for:     Brigitte was seen today for covid, cough and breathing problem.    Diagnoses and all orders for this visit:    COVID-19 virus infection       - encouraged to rest and continue to push fluids    Costochondritis       - can take ibuprofen as needed    Abdominal muscle pain  Comments:  left mid quadrant.  Reassured patient likely muscular pain due to coughing, advised to apply warm compress.    Diet-controlled type 2 diabetes mellitus (HCC)       - encouraged to recheck labs and follow up with endocrine.      The patient indicates understanding of these issues and agrees to the plan.  Follow-Up: The patient is asked to return in Return if symptoms worsen or fail to improve.  .     Nicolette Hernandez MD   11/15/24      CC: Covid, Cough, and Breathing Problem    HPI:   Brigitte Gregory is a 49 year old female who presents for Covid, Cough, and Breathing Problem     Patient is seen for follow up from urgent care. Patient states had cough, fever, fatigue, sore throat ongoing for a week, tested for COVID at home and was negative, went to urgent care as she was having chest pain with coughing and did test positive for COVID.    Cough is better, no more fever, feels fatigued, still has some chest discomfort and also has left mid abdominal discomfort.    ALLERGY:     Allergies as of 11/15/2024 - Unable to Assess 11/08/2024   Allergen Reaction Noted    Codeine RASH 03/18/2013    Seafood ANAPHYLAXIS and SWELLING 03/18/2013    Shellfish-derived products ANAPHYLAXIS and RASH 03/31/2016    Olmesartan medoxomil-hctz PAIN 05/03/2019    Enbrel RASH 04/17/2013    Irbesartan OTHER (SEE COMMENTS) 03/23/2021     MEDICATIONS:     Current Outpatient Medications   Medication Sig Dispense Refill    lisinopril 10 MG Oral Tab Take 1 tablet (10 mg total) by mouth in the morning and 1 tablet (10 mg total) before bedtime. 180 tablet 1     lisinopril-hydroCHLOROthiazide 20-25 MG Oral Tab Take 1 tablet by mouth every morning. 90 tablet 1    benzonatate 100 MG Oral Cap Take 1 capsule (100 mg total) by mouth 3 (three) times daily as needed for cough. (Patient not taking: Reported on 11/15/2024) 30 capsule 0    EPINEPHrine (EPIPEN 2-ALEXANDER) 0.3 MG/0.3ML Injection Solution Auto-injector Use as directed (Patient not taking: Reported on 11/15/2024) 1 each 1      Past Medical History:    Acanthosis nigricans    Anxiety state    BV (bacterial vaginosis)    Carpal tunnel syndrome    Depression    situational    Diabetes (HCC)    Endometriosis, site unspecified    External hemorrhoids without mention of complication    Gastritis    mild    Headache(784.0)    Helicobacter pylori (H. pylori)    H. pylori (+)    High-density lipoprotein deficiency    \"low HDL\"    Hypertension    Obesity    Other ill-defined conditions(799.89)    \"Microcytic indices; Needs labs done for iron studies and/or thalassemia!!\"    Other psoriasis    Pancreatitis (HCC)    Paresthesia    PONV (postoperative nausea and vomiting)    Unspecified essential hypertension    also hx essential HTN, benign    Unspecified pruritic disorder    Vertigo      Social History:  Social History     Socioeconomic History    Marital status:     Number of children: 0   Occupational History    Occupation: Professor     Employer: [employer]   Tobacco Use    Smoking status: Never     Passive exposure: Never    Smokeless tobacco: Never   Vaping Use    Vaping status: Never Used   Substance and Sexual Activity    Alcohol use: No    Drug use: No    Sexual activity: Not Currently     Partners: Male   Other Topics Concern    Caffeine Concern No    Occupational Exposure No    Sleep Concern No    Stress Concern Yes     Comment: My shortness of breath issues.    Weight Concern Yes    Special Diet Yes    Exercise Yes    Seat Belt Yes    Self-Exams Yes     Social Drivers of Health     Physical Activity:  Inactive (4/23/2021)    Received from [employer]    Exercise Vital Sign     Days of Exercise per Week: 0 days     Minutes of Exercise per Session: 0 min        REVIEW OF SYSTEMS:   A comprehensive 10 point review of systems was completed.  Pertinent positives and negatives noted in the the HPI.      EXAM:   /62   Pulse 84   Temp 97.7 °F (36.5 °C) (Temporal)   Resp 16   Ht 5' 1\" (1.549 m)   Wt 210 lb 2 oz (95.3 kg)   LMP 07/21/2019 (Exact Date)   SpO2 96%   BMI 39.70 kg/m²   GENERAL: obese,in no apparent distress  HEENT: atraumatic, normocephalic,ears and throat are clear  NECK: supple  CHEST: left, tenderness to palpation  LUNGS: clear to auscultation  CARDIO: RRR without murmur        NOTE TO PATIENT: The 21st Century Cures Act makes clinical notes like these available to patients in the interest of transparency. Clinical notes are medical documents used by physicians and care providers to communicate with each other. These documents include medical language and terminology, abbreviations, and treatment information that may sound technical and at times possibly unfamiliar. In addition, at times, the verbiage may appear blunt or direct. These documents are one tool providers use to communicate relevant information and clinical opinions of the care providers in a way that allows common understanding of the clinical context.      Nicolette Hernandez MD    11/15/24 11:32 AM

## 2024-12-23 DIAGNOSIS — I10 PRIMARY HYPERTENSION: ICD-10-CM

## 2024-12-26 ENCOUNTER — PATIENT MESSAGE (OUTPATIENT)
Dept: FAMILY MEDICINE CLINIC | Facility: CLINIC | Age: 49
End: 2024-12-26

## 2024-12-26 DIAGNOSIS — I10 PRIMARY HYPERTENSION: ICD-10-CM

## 2024-12-27 RX ORDER — LISINOPRIL 10 MG/1
TABLET ORAL
Qty: 180 TABLET | Refills: 1 | OUTPATIENT
Start: 2024-12-27

## 2024-12-27 RX ORDER — LISINOPRIL 10 MG/1
10 TABLET ORAL 2 TIMES DAILY
Qty: 180 TABLET | Refills: 1
Start: 2024-12-27

## 2024-12-27 NOTE — TELEPHONE ENCOUNTER
Patient has refill available at pharmacy, technician will have ready for patient to pick -up later today 12/27/2024.

## 2025-01-14 ENCOUNTER — PATIENT MESSAGE (OUTPATIENT)
Dept: FAMILY MEDICINE CLINIC | Facility: CLINIC | Age: 50
End: 2025-01-14

## 2025-01-15 NOTE — TELEPHONE ENCOUNTER
Patient requesting letter to excuse her from in person in service, but ok to attend virtually    Please review attached notes    LOV 11/15/24

## 2025-02-19 ENCOUNTER — LAB ENCOUNTER (OUTPATIENT)
Dept: LAB | Age: 50
End: 2025-02-19
Payer: COMMERCIAL

## 2025-02-19 DIAGNOSIS — E11.65 TYPE 2 DIABETES MELLITUS WITH HYPERGLYCEMIA, WITHOUT LONG-TERM CURRENT USE OF INSULIN (HCC): ICD-10-CM

## 2025-02-19 LAB
EST. AVERAGE GLUCOSE BLD GHB EST-MCNC: 163 MG/DL (ref 68–126)
HBA1C MFR BLD: 7.3 % (ref ?–5.7)

## 2025-02-19 PROCEDURE — 83036 HEMOGLOBIN GLYCOSYLATED A1C: CPT

## 2025-02-19 PROCEDURE — 36415 COLL VENOUS BLD VENIPUNCTURE: CPT

## 2025-02-20 ENCOUNTER — PATIENT MESSAGE (OUTPATIENT)
Facility: CLINIC | Age: 50
End: 2025-02-20

## 2025-02-20 ENCOUNTER — OFFICE VISIT (OUTPATIENT)
Facility: CLINIC | Age: 50
End: 2025-02-20
Payer: COMMERCIAL

## 2025-02-20 VITALS
BODY MASS INDEX: 39.65 KG/M2 | HEART RATE: 77 BPM | DIASTOLIC BLOOD PRESSURE: 80 MMHG | OXYGEN SATURATION: 99 % | WEIGHT: 210 LBS | HEIGHT: 61 IN | SYSTOLIC BLOOD PRESSURE: 126 MMHG

## 2025-02-20 DIAGNOSIS — E11.65 TYPE 2 DIABETES MELLITUS WITH HYPERGLYCEMIA, WITHOUT LONG-TERM CURRENT USE OF INSULIN (HCC): Primary | ICD-10-CM

## 2025-02-20 DIAGNOSIS — I15.2 HYPERTENSION ASSOCIATED WITH TYPE 2 DIABETES MELLITUS (HCC): ICD-10-CM

## 2025-02-20 DIAGNOSIS — Z53.20 REFUSAL OF STATIN MEDICATION BY PATIENT: ICD-10-CM

## 2025-02-20 DIAGNOSIS — E11.59 HYPERTENSION ASSOCIATED WITH TYPE 2 DIABETES MELLITUS (HCC): ICD-10-CM

## 2025-02-20 PROCEDURE — 99214 OFFICE O/P EST MOD 30 MIN: CPT

## 2025-02-20 PROCEDURE — 3074F SYST BP LT 130 MM HG: CPT

## 2025-02-20 PROCEDURE — 3008F BODY MASS INDEX DOCD: CPT

## 2025-02-20 PROCEDURE — 3051F HG A1C>EQUAL 7.0%<8.0%: CPT

## 2025-02-20 PROCEDURE — 3079F DIAST BP 80-89 MM HG: CPT

## 2025-02-20 RX ORDER — METFORMIN HYDROCHLORIDE 500 MG/1
500 TABLET, EXTENDED RELEASE ORAL 2 TIMES DAILY WITH MEALS
Qty: 180 TABLET | Refills: 1 | Status: CANCELLED | OUTPATIENT
Start: 2025-02-20

## 2025-02-20 NOTE — PROGRESS NOTES
[facility] Note    Name: Brigitte Gregory    Date: 2/20/2025    Brigitte Gregory is a 49 year old female who presents for evaluation of T2DM management.     Chief complaint: Follow - Up (Pt presents for DM follow up. Pt declines random blood glucose check. )       Subjective:   Initial HPI consult in January 2024  Here for DM from PCP for newly dx'd T2DM, A1C 12.2% at time of diagnosis   She trialed metformin 500mg daily x 1 week, 1G daily x 1 week - had a severe reaction  A1C in Nov 2023 was 12.2%, then in 12/2023 was 9.4%  States was asymptomatic at time of diagnosis, denies polys  Random BG in clinic 114  Hx of pancreatitis in 2006- d/t gallbladder s/p cholecystectomy, has not recurrent episode since     DM history:   Diagnosed w/ DM age: 48 (11/2023)  Pt denies hx of hospitalization for DM  Denies known fam hx of autoimmunity though mom has thyroid condition, only self for psoriasis   Family history of DM: mother- T2DM  Diet: Previously used a 'medical medium' diet (No gluten/dairy/red meat/celery juicing) and resolved her psoriasis. She reintroduced some dairy, psoriasis re-flared. Was eating a lot of vegan or healthy options but didn't note the carb intake in those options. Since diagnosis, saw CDE at DM center,  restarted L[employer] It! [employer] to track carbs/protein intake.   She's eating about ~150g of carb/day, higher protein (50-60g/day)  Exercise: got a walking pad, tries to walk after a meal- 1 mile. Used to love playing tennis  DM meds at first office visit: none     Blood Glucose log/CGM: not currently checking BG at home, a bit afraid of needles    Interim hx:  2/20/2025-w/ Nila HANSEN. In person visit. Last A1c value was 7.3% done 2/19/2025.  Last office visit, spoke more about lifestyle changes. gave referrals to medical fitness.  Did not end up following through, was too far from her house. Still doesn't check BG, declined random BG in clinic  Getting over a cold right now. Had COVID a few months  ago and was sick for over 1 month  Sites there were a lot of ongoing stressors, and dietary indiscretions, yet despite this her A1c went down so she is pleasantly surprised  Declines Dexcom at this time  Is not interested in medication, she sees that as a failure, she is angry about having diabetes  Feeling a lot of sadness around being an empty peter, states this is a new beginning for her, needs to put herself back out there    DM meds at visit: none       History/Other:    Allergies, PMH, SocHx and FHx reviewed and updated as appropriate in Epic on   No outpatient medications have been marked as taking for the 2/20/25 encounter (Office Visit) with Hanny Mcdonald APN.     Allergies[1]  Current Outpatient Medications   Medication Sig Dispense Refill    lisinopril 10 MG Oral Tab Take 1 tablet (10 mg total) by mouth in the morning and 1 tablet (10 mg total) before bedtime. 180 tablet 1    lisinopril-hydroCHLOROthiazide 20-25 MG Oral Tab Take 1 tablet by mouth every morning. 90 tablet 1    EPINEPHrine (EPIPEN 2-ALEXANDER) 0.3 MG/0.3ML Injection Solution Auto-injector Use as directed (Patient not taking: Reported on 11/15/2024) 1 each 1     Past Medical History:    Acanthosis nigricans    Anxiety state    BV (bacterial vaginosis)    Carpal tunnel syndrome    Depression    situational    Diabetes (HCC)    Endometriosis, site unspecified    External hemorrhoids without mention of complication    Gastritis    mild    Headache(784.0)    Helicobacter pylori (H. pylori)    H. pylori (+)    High-density lipoprotein deficiency    \"low HDL\"    Hypertension    Obesity    Other ill-defined conditions(799.89)    \"Microcytic indices; Needs labs done for iron studies and/or thalassemia!!\"    Other psoriasis    Pancreatitis (HCC)    Paresthesia    PONV (postoperative nausea and vomiting)    Unspecified essential hypertension    also hx essential HTN, benign    Unspecified pruritic disorder    Vertigo     Family History   Problem  Relation Age of Onset    Diabetes Mother     Other (digestive disorder) Mother         gallbladder    Other (HTN) Mother     Hypertension Mother     Other (digestive disorder) Sister         gallbladder    Other (digestive disorder) Other         gallbladder, grandmother    Diabetes Other         mother's side, aunts/uncles    Other (kidney failure) Other         aunt and uncle, d/t DM     Social history: Reviewed.      ROS/Exam    REVIEW OF SYSTEMS: Ten point review of systems has been performed and is otherwise negative and/or non-contributory, except as described above.     VITALS  Vitals:    02/20/25 1137   BP: 126/80   BP Location: Left arm   Patient Position: Sitting   Cuff Size: large   Pulse: 77   SpO2: 99%   Weight: 210 lb (95.3 kg)   Height: 5' 1\" (1.549 m)       Wt Readings from Last 6 Encounters:   02/20/25 210 lb (95.3 kg)   11/15/24 210 lb 2 oz (95.3 kg)   11/08/24 212 lb (96.2 kg)   06/06/24 210 lb (95.3 kg)   02/23/24 209 lb (94.8 kg)   02/02/24 214 lb (97.1 kg)       PHYSICAL EXAM  CONSTITUTIONAL:  awake, alert, cooperative, no apparent distress, and appears stated age   PSYCH: normal affect  LUNGS: breathing comfortably  CARDIOVASCULAR:  regular rate     Labs/Imaging: Pertinent imaging reviewed.    Overall glucose control:  Lab Results   Component Value Date    A1C 7.3 (H) 02/19/2025    A1C 7.4 (H) 06/04/2024    A1C 7.4 (H) 02/16/2024    A1C 9.4 (H) 12/13/2023    A1C 12.2 (H) 11/17/2023       Supplementary Documentation:   -Surveillance for Diabetes Complications & Risks  Foot exam/neuropathy: No data recorded  Retinopathy screening: No data recordedNo data recorded    Assessment & Plan:     ICD-10-CM    1. Type 2 diabetes mellitus with hyperglycemia, without long-term current use of insulin (HCC)  E11.65       2. Refusal of statin medication by patient  Z53.20       3. Hypertension associated with type 2 diabetes mellitus (HCC)  E11.59     I15.2           Brigitte Gregory is a pleasant 49 year old  female here for evaluation of:    #Type 2 Diabetes  #History of gallstone pancreatitis s/p cholecystectomy  PMHx of Type 2 diabetes mellitus diagnosed in 2023- A1C 12.2% at time of diagnosis. Managed to bring down A1C to 7.3% w/ lifestyle changes only. Does not check BG at home due to needle aversion.      Reviewed today that A1c is still not to goal.  Patient is very against use of medication, she sees that as a failure, and feels very angry about having diabetes.  Reviewed that she does have a strong family history of diabetes, there is a genetic component to this.  She has made great lifestyle efforts.   Offered Dexcom CGM sample trial so that we could evaluate what causes the blood sugars to spike, but would like to hold off on this for now because of her psoriasis, sensitive to adhesives. Discussed the role of stress management as stress can contribute to hyperglycemia.  It may be worthwhile to talk to a therapist about stress management which may have a positive effect on glycemic control.     Last A1c value was 7.3% done 2025.  Goal <7%. Importance of better glucose control in preventing onset/progression of end-organ damage discussed, as well as goals of therapy and clinical significance of A1C.     Plan:  - No medications today   - offered CGM trial, politely declines  - declines testing at home with glucometer  - offered med fitness referral, no longer interested, she set goal of getting back into a fitness activity she enjoys (tennis, maybe pickleball)   - not interested in SGLT2i states a friend's dad ' the day after taking it'  - avoiding metformin - bad reaction to it when taken in the past which scares her, migraines and diarrhea  - avoiding GLP, hx of pancreatitis (dt gallstones-- could reconsider, maybe trial rybelsus)/ needle aversion  - Discussed management of low glucose and targeted glucose levels and provided instructions in AVS   -See above header \"Supplementary Documentation\" for  surveillance for diabetes complications & risks    Nephropathy screening:   -stable, continue annual monitoring  Lab Results   Component Value Date    EGFRCR 88 06/04/2024    MICROALBCREA 5.6 02/16/2024   No results found for: \"CREAUR\", \"MICROALBUMIN\", \"MALBCREACALC\"    #Hypertension  - SBP is to goal <130, continue follow up with PCP   BP Readings from Last 1 Encounters:   02/20/25 126/80   BP Meds: lisinopril Tabs - 10 MG; lisinopril-hydroCHLOROthiazide Tabs - 20-25 MG     #Hyperlipidemia  Lipids: LDL is not to goal, continue follow up with PCP   Lab Results   Component Value Date     (H) 06/04/2024    TRIG 188 (H) 06/04/2024   Cholesterol Meds:  none, averse to medications     Return in about 3 months (around 5/20/2025) for diabetes follow up.    TYRONE Huerta  Endocrinology, Diabetes & Metabolism   2/20/2025    Note to patient: The 21 Century Cures Act makes medical notes like these available to patients in the interest of transparency. However, be advised this is a medical document. It is intended as peer to peer communication. It is written in medical language and may contain abbreviations or verbiage that are unfamiliar. It may appear blunt or direct. Medical documents are intended to carry relevant information, facts as evident, and the clinical opinion of the practitioner.         [1]   Allergies  Allergen Reactions    Codeine RASH    Seafood ANAPHYLAXIS and SWELLING     Angioedema      Shellfish-Derived Products ANAPHYLAXIS and RASH    Olmesartan Medoxomil-Hctz PAIN     Headaches daily, rash    Enbrel RASH    Irbesartan OTHER (SEE COMMENTS)     Headache, dizziness

## 2025-02-20 NOTE — PATIENT INSTRUCTIONS
Return Visit:  With TYRONE Huerta: Return in about 3 months (around 5/20/2025) for diabetes follow up.        Plan:  - follow up regarding mental health as this can play a role in high blood sugars   [place] location:  [facility]  [address]  [address]  [place], [place] [address] [phone]         Updated diabetes medication instructions from today:     Lab Results   Component Value Date    A1C 7.3 (H) 02/19/2025    A1C 7.4 (H) 06/04/2024    A1C 7.4 (H) 02/16/2024    A1C 9.4 (H) 12/13/2023    A1C 12.2 (H) 11/17/2023        General follow up information:  Please let us know if you require any refills at least 1 week prior to your medication running out. If you do run out of medication, please call our office ASAP to request refills (do not wait until your follow up).   Please call our office if sugars at home are consistently greater than 250 or less than 70 for medication adjustment (do not wait until your follow up appointment).  Lab results and imaging will typically be reviewed at follow up appointments, or within 3-5 business days of ALL results being in if you do not have an appointment scheduled in the near future. Our office will contact you for any abnormal results requiring more urgent follow up or action.   The on-call pager is for urgent matters only. If you are a type 1 diabetic and run out of insulin after business hours 8AM-4PM, you may call the on-call pager for a refill to a 24 hour pharmacy.  If you have adrenal insufficiency and run out of steroids, you may call the on-call pager for a refill to a 24 hour pharmacy. All other refill requests should be requested during business hours.    Office phone number: [phone]; phones are open Monday-Friday 8:30-4:30.       HOW TO TREAT LOW BLOOD SUGAR (Hypoglycemia)  Low blood sugar= Less than 70, or if you start to have symptoms (below)  Symptoms: Shaking or trembling, fast heart rate, extreme hunger, sweating,  confusion/difficulty concentrating, dizziness.    How to treat a low blood sugar if you are able to eat/drink: The Rule of 15/15  If you are using continuous glucose monitor that says you are low, but you do not have any symptoms, verify on fingerstick that your blood sugar is actually low before treating.   Eat 15 grams of carbs (see examples below)  Check your blood sugar after 15 minutes. If it’s still below your target range, have another serving.   Repeat these steps until it’s in your target range. Once it’s in range, if you're nervous about your sugar going low again, have a protein source (ie, a spoonful of peanut butter).   If you have a CGM you want to look for how your arrow has changed. If you arrow is pointed up or sideways after 15 min, give your CGM more time OR check with a finger stick. Try not to eat more food until at least 15 min after the first BG check - otherwise you risk having a rebound high.  If you are experiencing symptoms and you are unable to check your blood glucose for any reason, treat the hypoglycemia.  If someone has a low blood sugar and is unconscious: Don’t hesitate to call 911. If someone is unconscious and glucagon is not available or someone does not know how to use it, call 911 immediately.     To treat a low, I recommend you carry with you easy, pre-portioned treatment for low blood sugars that are 15G of carbs:   - Children sized squeeze pouch applesauce (high fiber + carbs help prevent too high of a spike)  - Small children's sized juicebox- 15g carb --> 4oz juice box  - Glucose tablets from [employer], you can find them near diabetes supplies --> Note, you will need to eat 3-4 tablets to get to 15g of carbs  - Children sized fruit snack pack- look for one with 15 grams of total carbohydrate  - Choice of how to treat your low is important. Complex carbs, or foods that contain fats along with carbs (like chocolate) can slow the absorption of glucose and should NOT be  used to treat an emergency low

## 2025-02-21 ENCOUNTER — OFFICE VISIT (OUTPATIENT)
Dept: FAMILY MEDICINE CLINIC | Facility: CLINIC | Age: 50
End: 2025-02-21
Payer: COMMERCIAL

## 2025-02-21 VITALS
DIASTOLIC BLOOD PRESSURE: 78 MMHG | HEIGHT: 61 IN | BODY MASS INDEX: 39.65 KG/M2 | TEMPERATURE: 98 F | HEART RATE: 88 BPM | WEIGHT: 210 LBS | OXYGEN SATURATION: 98 % | RESPIRATION RATE: 18 BRPM | SYSTOLIC BLOOD PRESSURE: 118 MMHG

## 2025-02-21 DIAGNOSIS — E11.9 CONTROLLED TYPE 2 DIABETES MELLITUS WITHOUT COMPLICATION, WITHOUT LONG-TERM CURRENT USE OF INSULIN (HCC): ICD-10-CM

## 2025-02-21 DIAGNOSIS — M54.6 ACUTE LEFT-SIDED THORACIC BACK PAIN: ICD-10-CM

## 2025-02-21 DIAGNOSIS — E66.01 CLASS 2 SEVERE OBESITY DUE TO EXCESS CALORIES WITH SERIOUS COMORBIDITY AND BODY MASS INDEX (BMI) OF 39.0 TO 39.9 IN ADULT (HCC): ICD-10-CM

## 2025-02-21 DIAGNOSIS — L40.9 PSORIASIS: ICD-10-CM

## 2025-02-21 DIAGNOSIS — E66.812 CLASS 2 SEVERE OBESITY DUE TO EXCESS CALORIES WITH SERIOUS COMORBIDITY AND BODY MASS INDEX (BMI) OF 39.0 TO 39.9 IN ADULT (HCC): ICD-10-CM

## 2025-02-21 DIAGNOSIS — I10 PRIMARY HYPERTENSION: Primary | ICD-10-CM

## 2025-02-21 PROBLEM — I15.9 SECONDARY HYPERTENSION: Status: RESOLVED | Noted: 2019-08-07 | Resolved: 2025-02-21

## 2025-02-21 PROCEDURE — 3074F SYST BP LT 130 MM HG: CPT | Performed by: FAMILY MEDICINE

## 2025-02-21 PROCEDURE — 99214 OFFICE O/P EST MOD 30 MIN: CPT | Performed by: FAMILY MEDICINE

## 2025-02-21 PROCEDURE — 3078F DIAST BP <80 MM HG: CPT | Performed by: FAMILY MEDICINE

## 2025-02-21 PROCEDURE — 3008F BODY MASS INDEX DOCD: CPT | Performed by: FAMILY MEDICINE

## 2025-02-21 RX ORDER — LISINOPRIL AND HYDROCHLOROTHIAZIDE 20; 25 MG/1; MG/1
1 TABLET ORAL EVERY MORNING
Qty: 90 TABLET | Refills: 1 | Status: SHIPPED | OUTPATIENT
Start: 2025-02-21

## 2025-02-21 RX ORDER — LISINOPRIL 10 MG/1
10 TABLET ORAL 2 TIMES DAILY
Qty: 180 TABLET | Refills: 1 | Status: SHIPPED | OUTPATIENT
Start: 2025-02-21

## 2025-02-21 NOTE — PROGRESS NOTES
Family Medicine Progress Note  ASSESSMENT AND PLAN:  Brigitte Gregory is a 49 year old female who is here for:     Brigitte was seen today for diabetes.    Diagnoses and all orders for this visit:    Primary hypertension controlled           -  continue the same dose of medication          -  DASH diet, limit salt to less than 2000 mg           - Goal BP less than 130/80            -     lisinopril 10 MG Oral Tab; Take 1 tablet (10 mg total) by mouth in the morning and 1 tablet (10 mg total) before bedtime.     -     lisinopril-hydroCHLOROthiazide 20-25 MG Oral Tab; Take 1 tablet by mouth every morning.    Controlled type 2 diabetes mellitus without complication, without long-term current use of insulin ([facility])        - HgbA1c 7.3, no at goal       - tries to control with diet and lifestyle changes which she has not been very consistent with       - Has refused to start medication        - continue to follow up with endocrine for management        - encouraged to get her eye exam done    Psoriasis        -  refused prescription medication       - advised referral to dermatology for management, patient refused.    Class 2 severe obesity due to excess calories with serious comorbidity and body mass index (BMI) of 39.0 to 39.9 in adult ([facility])         - encouraged healthy portion controlled diet         - limit refined sugars and carbs         - increase protein to 15-30 gm with each meal         - exercise for 150 min a week including cardio and strength training    Acute left-sided thoracic back pain      - muscular, likely secondary to coughing      - advised to alternate heat and ice.      - can take ibuprofen or aleve as needed for pain.       The patient indicates understanding of these issues and agrees to the plan.  Follow-Up: The patient is asked to  Return in about 6 months (around 8/21/2025) for annual, HTN f/u.  .     Nicolette Hernandez MD        CC: Diabetes    HPI:   Brigitte Gregory is a 49 year old female who  presents for Diabetes     Sick for 2 weeks, getting better now. Still has some cough, pain in the left mid back when she coughs.    States has not been compliant with diet and exercise since [birth year] after her birthday,was drinking more juice and also during the holidays indulged in some sweets and now been sick again. Has not been tracking her sugar. Followed up with endocrine last month and her HgbA1c was stable.  Has not done her eye exam yet as she was sick.    States since she has not been watching her diet her psoriasis has flared up.    Compliant with her blood pressure medication and low salt diet, tolerating medication well without any side effects, not monitoring her BP at home.    ALLERGY:     Allergies as of 02/21/2025 - Review Complete 02/21/2025   Allergen Reaction Noted    Codeine RASH 03/18/2013    Seafood ANAPHYLAXIS and SWELLING 03/18/2013    Shellfish-derived products ANAPHYLAXIS and RASH 03/31/2016    Olmesartan medoxomil-hctz PAIN 05/03/2019    Enbrel RASH 04/17/2013    Irbesartan OTHER (SEE COMMENTS) 03/23/2021       MEDICATIONS:     Current Outpatient Medications   Medication Sig Dispense Refill    lisinopril 10 MG Oral Tab Take 1 tablet (10 mg total) by mouth in the morning and 1 tablet (10 mg total) before bedtime. 180 tablet 1    lisinopril-hydroCHLOROthiazide 20-25 MG Oral Tab Take 1 tablet by mouth every morning. 90 tablet 1    EPINEPHrine (EPIPEN 2-ALEXANDER) 0.3 MG/0.3ML Injection Solution Auto-injector Use as directed 1 each 1      Past Medical History:    Acanthosis nigricans    Anxiety state    BV (bacterial vaginosis)    Carpal tunnel syndrome    Depression    situational    Diabetes (HCC)    Endometriosis, site unspecified    External hemorrhoids without mention of complication    Gastritis    mild    Headache(784.0)    Helicobacter pylori (H. pylori)    H. pylori (+)    High-density lipoprotein deficiency    \"low HDL\"    Hypertension    Obesity    Other ill-defined conditions(799.89)     \"Microcytic indices; Needs labs done for iron studies and/or thalassemia!!\"    Other psoriasis    Pancreatitis (HCC)    Paresthesia    PONV (postoperative nausea and vomiting)    Unspecified essential hypertension    also hx essential HTN, benign    Unspecified pruritic disorder    Vertigo      Social History:  Social History     Socioeconomic History    Marital status:     Number of children: 0   Occupational History    Occupation: Professor     Employer: [employer]   Tobacco Use    Smoking status: Never     Passive exposure: Never    Smokeless tobacco: Never   Vaping Use    Vaping status: Never Used   Substance and Sexual Activity    Alcohol use: No    Drug use: No    Sexual activity: Not Currently     Partners: Male   Other Topics Concern    Caffeine Concern No    Occupational Exposure No    Sleep Concern No    Stress Concern Yes     Comment: My shortness of breath issues.    Weight Concern Yes    Special Diet Yes    Exercise Yes    Seat Belt Yes    Self-Exams Yes     Social Drivers of Health     Food Insecurity: No Food Insecurity (2/21/2025)    NCSS - Food Insecurity     Worried About Running Out of Food in the Last Year: No     Ran Out of Food in the Last Year: No   Transportation Needs: No Transportation Needs (2/21/2025)    NCSS - Transportation     Lack of Transportation: No   Housing Stability: Not At Risk (2/21/2025)    NCSS - Housing/Utilities     Has Housing: Yes     Worried About Losing Housing: No     Unable to Get Utilities: No        REVIEW OF SYSTEMS:   A comprehensive 10 point review of systems was completed.  Pertinent positives and negatives noted in the the HPI.      EXAM:   /78   Pulse 88   Temp 97.9 °F (36.6 °C) (Temporal)   Resp 18   Ht 5' 1\" (1.549 m)   Wt 210 lb (95.3 kg)   LMP 07/21/2019 (Exact Date)   SpO2 98%   BMI 39.68 kg/m²   GENERAL: obese,in no apparent distress  SKIN: plaque psoriasis on arms and legs  HEENT: atraumatic, normocephalic,ears and throat  are clear  NECK: supple,no adenopathy,no bruits  LUNGS: clear to auscultation  CARDIO: RRR without murmur  BACK: tenderness to palpation over the left trapezius  EXTREMITIES: no cyanosis, clubbing or edema    Encounter took 30 min including taking history, performing physical exam, reviewing external test results, counseling and educating patient, answering patient questions and documenting in EHR.    NOTE TO PATIENT: The 21st Century Cures Act makes clinical notes like these available to patients in the interest of transparency. Clinical notes are medical documents used by physicians and care providers to communicate with each other. These documents include medical language and terminology, abbreviations, and treatment information that may sound technical and at times possibly unfamiliar. In addition, at times, the verbiage may appear blunt or direct. These documents are one tool providers use to communicate relevant information and clinical opinions of the care providers in a way that allows common understanding of the clinical context.      Nicolette Hernandez MD

## 2025-03-24 ENCOUNTER — APPOINTMENT (OUTPATIENT)
Dept: GENERAL RADIOLOGY | Age: 50
End: 2025-03-24
Attending: NURSE PRACTITIONER
Payer: COMMERCIAL

## 2025-03-24 ENCOUNTER — HOSPITAL ENCOUNTER (OUTPATIENT)
Age: 50
Discharge: HOME | End: 2025-03-24
Payer: COMMERCIAL

## 2025-03-24 VITALS
OXYGEN SATURATION: 97 % | TEMPERATURE: 98 F | HEART RATE: 87 BPM | SYSTOLIC BLOOD PRESSURE: 129 MMHG | DIASTOLIC BLOOD PRESSURE: 84 MMHG | RESPIRATION RATE: 20 BRPM

## 2025-03-24 DIAGNOSIS — Q85.9 HAMARTOMA (HCC): ICD-10-CM

## 2025-03-24 DIAGNOSIS — J10.1 INFLUENZA B: ICD-10-CM

## 2025-03-24 DIAGNOSIS — J40 BRONCHITIS: ICD-10-CM

## 2025-03-24 DIAGNOSIS — R05.1 ACUTE COUGH: Primary | ICD-10-CM

## 2025-03-24 LAB
POCT INFLUENZA A: NEGATIVE
POCT INFLUENZA B: POSITIVE
SARS-COV-2 RNA RESP QL NAA+PROBE: NOT DETECTED

## 2025-03-24 PROCEDURE — 87502 INFLUENZA DNA AMP PROBE: CPT | Performed by: NURSE PRACTITIONER

## 2025-03-24 PROCEDURE — 71046 X-RAY EXAM CHEST 2 VIEWS: CPT | Performed by: NURSE PRACTITIONER

## 2025-03-24 PROCEDURE — 99214 OFFICE O/P EST MOD 30 MIN: CPT | Performed by: NURSE PRACTITIONER

## 2025-03-24 PROCEDURE — U0002 COVID-19 LAB TEST NON-CDC: HCPCS | Performed by: NURSE PRACTITIONER

## 2025-03-24 RX ORDER — ALBUTEROL SULFATE 90 UG/1
2 INHALANT RESPIRATORY (INHALATION) EVERY 4 HOURS PRN
Qty: 1 EACH | Refills: 0 | Status: SHIPPED | OUTPATIENT
Start: 2025-03-24 | End: 2025-03-24

## 2025-03-24 RX ORDER — PEN NEEDLE, DIABETIC 31 GX5/16"
1 NEEDLE, DISPOSABLE MISCELLANEOUS AS NEEDED
Qty: 1 EACH | Refills: 0 | Status: SHIPPED | OUTPATIENT
Start: 2025-03-24 | End: 2025-03-24

## 2025-03-24 NOTE — DISCHARGE INSTRUCTIONS
The flu test is positive.  Symptoms should improve over the next few days, although the cough may be persistent.  You may continue to use Delsym as needed for the cough.  Make sure you are drinking plenty of fluids, use sugar-free cough drops, Vicks on your chest, saline nasal spray, humidifier in your room.  Recheck with your primary doctor if you are not feeling better

## 2025-03-24 NOTE — ED PROVIDER NOTES
Patient Seen in: [facility]      History     Chief Complaint   Patient presents with    Cough/URI    Fever    Ear Problem Pain     Stated Complaint: cough  Subjective:   49-year-old female with type 2 diabetes, hypertension, psoriasis presents from home.  Patient states she has been ill for about 7 days.  Complaining of cough with intermittent fever.  States she has some fullness to her ears but no significant pain.  She has taken Zicam several times and has been using Delsym for the cough.  She has noted some wheezing for the last 4 days.  No COVID testing was done at home.  She denies a history of pneumonia.  She is a non-smoker.  States she had COVID in November.  Voices concern about recent recurrent illnesses, has been seen several times, believes it is from her autoimmune disorder (psoriasis).     The history is provided by the patient. No  was used.     Objective:   Past Medical History:    Acanthosis nigricans    BV (bacterial vaginosis)    Carpal tunnel syndrome    Diabetes (HCC)    Endometriosis, site unspecified    External hemorrhoids without mention of complication    Gastritis    mild    Headache(784.0)    Helicobacter pylori (H. pylori)    H. pylori (+)    High-density lipoprotein deficiency    \"low HDL\"    Hypertension    Obesity    Other ill-defined conditions(799.89)    \"Microcytic indices; Needs labs done for iron studies and/or thalassemia!!\"    Other psoriasis    Pancreatitis (HCC)    Paresthesia    PONV (postoperative nausea and vomiting)    Unspecified essential hypertension    also hx essential HTN, benign    Unspecified pruritic disorder    Vertigo            Past Surgical History:   Procedure Laterality Date    Angiogram  2015    Cholecystectomy  2005    laparoscopic, Dr. Mcpherson    Colonoscopy      Hysterectomy  2019    Laparoscopy procedure unlisted  1995    Laparoscopy    Laparoscopy, surg, w/vaginal hysterectomy, uterus >250gms; w/remove tube(s)  &/or ovary(s)  08/13/2019    saranya Luna, for large and growing fibroid that proved benign              Social History     Socioeconomic History    Marital status:     Number of children: 0   Occupational History    Occupation: Professor     Employer: [employer]   Tobacco Use    Smoking status: Never     Passive exposure: Never    Smokeless tobacco: Never   Vaping Use    Vaping status: Never Used   Substance and Sexual Activity    Alcohol use: No    Drug use: No    Sexual activity: Not Currently     Partners: Male   Other Topics Concern    Caffeine Concern No    Occupational Exposure No    Sleep Concern No    Stress Concern Yes     Comment: My shortness of breath issues.    Weight Concern Yes    Special Diet Yes    Exercise Yes    Seat Belt Yes    Self-Exams Yes     Social Drivers of Health     Food Insecurity: No Food Insecurity (2/21/2025)    NCSS - Food Insecurity     Worried About Running Out of Food in the Last Year: No     Ran Out of Food in the Last Year: No   Transportation Needs: No Transportation Needs (2/21/2025)    NCSS - Transportation     Lack of Transportation: No   Housing Stability: Not At Risk (2/21/2025)    NCSS - Housing/Utilities     Has Housing: Yes     Worried About Losing Housing: No     Unable to Get Utilities: No            Review of Systems    Positive for stated complaint: Cough/URI, Fever, and Ear Problem Pain    Other systems are as noted in HPI.  Constitutional and vital signs reviewed.      All other systems reviewed and negative except as noted above.    Physical Exam     ED Triage Vitals [03/24/25 0819]   /84   Pulse 87   Resp 20   Temp 98.3 °F (36.8 °C)   Temp src Oral   SpO2 97 %   O2 Device None (Room air)     Current:/84   Pulse 87   Temp 98.3 °F (36.8 °C) (Oral)   Resp 20   LMP 07/21/2019 (Exact Date)   SpO2 97%     Physical Exam  Vitals and nursing note reviewed.   Constitutional:       General: She is not in acute distress.     Appearance:  Normal appearance. She is not ill-appearing or toxic-appearing.   HENT:      Head: Normocephalic and atraumatic.      Right Ear: Tympanic membrane, ear canal and external ear normal.      Left Ear: Tympanic membrane, ear canal and external ear normal.      Nose: Congestion present. No mucosal edema.      Right Turbinates: Not enlarged.      Left Turbinates: Not enlarged.      Right Sinus: No maxillary sinus tenderness or frontal sinus tenderness.      Left Sinus: No maxillary sinus tenderness or frontal sinus tenderness.      Mouth/Throat:      Mouth: Mucous membranes are moist.      Pharynx: Oropharynx is clear.   Eyes:      Pupils: Pupils are equal, round, and reactive to light.   Cardiovascular:      Rate and Rhythm: Normal rate and regular rhythm.      Pulses: Normal pulses.   Pulmonary:      Effort: Pulmonary effort is normal. No respiratory distress.      Breath sounds: Normal breath sounds.      Comments: Lungs clear.  No adventitious lung sounds.  No distress.  No hypoxia.  Pulse ox 97% ra. Which is normal    Abdominal:      General: Abdomen is flat.      Palpations: Abdomen is soft.   Musculoskeletal:         General: No signs of injury. Normal range of motion.      Cervical back: Normal range of motion and neck supple.   Lymphadenopathy:      Cervical: No cervical adenopathy.   Skin:     General: Skin is warm and dry.      Capillary Refill: Capillary refill takes less than 2 seconds.   Neurological:      General: No focal deficit present.      Mental Status: She is alert and oriented to person, place, and time.      GCS: GCS eye subscore is 4. GCS verbal subscore is 5. GCS motor subscore is 6.   Psychiatric:         Mood and Affect: Mood normal.         Behavior: Behavior normal.         Thought Content: Thought content normal.         Judgment: Judgment normal.         ED Course   Radiology:  XR CHEST PA + LAT CHEST (BOU=18219)    Addendum Date: 3/24/2025    CORRECTION Corrected on: 3/24/2025;    PROCEDURE:  XR CHEST PA + LAT CHEST (CPT=71046)  INDICATIONS:  cough wheeze fever  COMPARISON:  [facility], XR, XR CHEST PA + LAT CHEST (CPT=71046), 11/08/2024, 10:09 AM.  [facility] , XR, XR CHEST AP PORTABLE  (CPT=71045), 4/07/2022, 1:26 PM.  TECHNIQUE:  PA and lateral chest radiographs were obtained.  PATIENT STATED HISTORY: (As transcribed by Technologist)  Pt c/o symptoms starting on Tuesday.  Pt c/o cough , fever and ear stuffiness. Pt states now she's having coughing spasm.    FINDINGS:  LUNGS:  No focal consolidation.  Normal vascularity.  Mild bronchitis.  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  No mass or consolidation. CARDIAC:  Normal size cardiac silhouette. MEDIASTINUM:  Normal. PLEURA:  Normal.  No pleural effusions. BONES:  Normal for age.  CONCLUSION:  Mild bronchitis without pneumonia.   LOCATION:  [id number]   Dictated by ([facility]): Jak Ma MD on 3/24/2025 at 8:57 AM     Finalized by (CST): Jak Ma MD on 3/24/2025 at 9:07 AM    Dictated by (CST): Jak Ma MD on 3/24/2025 at 9:19 AM     Finalized by (CST): Jak Ma MD on 3/24/2025 at 9:19 AM              Result Date: 3/24/2025  CONCLUSION:  WARNING:  THIS REPORT WAS APPROVED PREMATURELY AND IS NOT COMPLETE.  PLEASE CONTACT US IMMEDIATELY.   LOCATION:  [place] [id number]   Dictated by (CST): Jak Ma MD on 3/24/2025 at 8:57 AM     Finalized by (CST): Jak Ma MD on 3/24/2025 at 9:07 AM      Labs Reviewed   POCT FLU TEST - Abnormal; Notable for the following components:       Result Value    POCT INFLUENZA B Positive (*)     All other components within normal limits    Narrative:     This assay is a rapid molecular in vitro test utilizing nucleic acid amplification of influenza A and B viral RNA.   RAPID SARS-COV-2 BY PCR - Normal     Recent Results (from the past 24 hours)   POCT Flu Test    Collection Time: 03/24/25  9:22 AM    Specimen: Nares;  Other   Result Value Ref Range    POCT INFLUENZA A Negative Negative    POCT INFLUENZA B Positive (A) Negative   Rapid SARS-CoV-2 by PCR    Collection Time: 03/24/25  9:22 AM    Specimen: Nares; Other   Result Value Ref Range    Rapid SARS-CoV-2 by PCR Not Detected Not Detected       MDM     Medical Decision Making  Differential diagnoses reflecting the complexity of care include: Influenza, COVID, pneumonia, otitis media, viral URI, bronchitis  Patient with complaints of cough, fever, wheezing, ear pain  Bilateral TMs are clear.  No evidence of otitis media  Flu B is positive.  Flu A is negative  COVID is negative  Chest x-ray is negative for pneumonia.  Showing evidence of mild bronchitis. Also noted - The known 2.6 cm hamartoma superior to the right hilum in the RLL, superior segment is unchanged.  Discussed chest x-ray findings with the patient.  She was aware of the hamartoma - we did review the 2 previous chest x-rays and it does appear to have decreased in size.  We discussed the viral origin of her symptoms.  She is outside the window for Tamiflu treatment.  We did discuss options for symptom control including albuterol MDI, Tessalon, guaifenesin with codeine.  Patient declined prescriptions and states she will continue to use her over-the-counter remedies    Plan of Care: Patient states she will take Delsym for her cough.  Recommend sugar-free cough drops.  Oral fluids.  Recheck with primary doctor if no improvement.  We did discuss that cough from bronchitis may be persistent.    Results and plan of care discussed with the patient/family. They are in agreement with discharge. They understand to follow up with their primary doctor or the referral physician for further evaluation, especially if no improvement.  Also discussed the limitations of immediate care, patient is aware that if symptoms are worse they should go to the emergency room. Verbal and written discharge instructions were given.     My  independent interpretation of studies of: No pneumonia on chest x-ray  Diagnostic tests and medications considered but not ordered were: No indication for antibiotics  Shared decision making was done by: Patient declined prescriptions for cough control  Comorbidities that add complexity to management include: Type 2 diabetes, hypertension, psoriasis  External chart review was done and was noted: A1c previously 12.1, most recent 7.3.  Not on any oral medications for diabetes at this time, doing dietary changes and exercises  History obtained by an independent source was from: N/A   Discussions and management was done with: N/A  Social determinants of health that affect care: N/A              Problems Addressed:  Acute cough: acute illness or injury  Bronchitis: acute illness or injury  Hamartoma (HCC): acute illness or injury  Influenza B: acute illness or injury    Amount and/or Complexity of Data Reviewed  External Data Reviewed: labs and radiology.  Labs: ordered. Decision-making details documented in ED Course.  Radiology: ordered and independent interpretation performed. Decision-making details documented in ED Course.    Risk  OTC drugs.        Disposition and Plan     Clinical Impression:  1. Acute cough    2. Influenza B    3. Hamartoma (HCC)    4. Bronchitis         Disposition:  Discharge  3/24/2025  9:18 am    Follow-up:  Nicloette Hernandez MD  [address] [place] [address]  [phone]                Medications Prescribed:  Discharge Medication List as of 3/24/2025  9:20 AM        START taking these medications    Details   albuterol 108 (90 Base) MCG/ACT Inhalation Aero Soln Inhale 2 puffs into the lungs every 4 (four) hours as needed for Wheezing., Normal, Disp-1 each, R-0      Spacer/Aero-Holding Chambers (PURE COMFORT SPACER CHAMBER) Does not apply Device 1 Device as needed., Normal, Disp-1 each, R-0

## 2025-03-24 NOTE — ED INITIAL ASSESSMENT (HPI)
Pt c/o symptoms starting on Tuesday.  Pt c/o cough , fever and ear stuffiness. Pt states now she's having coughing spasm.

## 2025-03-28 ENCOUNTER — HOSPITAL ENCOUNTER (OUTPATIENT)
Age: 50
Discharge: HOME | End: 2025-03-28
Payer: COMMERCIAL

## 2025-03-28 ENCOUNTER — PATIENT MESSAGE (OUTPATIENT)
Dept: FAMILY MEDICINE CLINIC | Facility: CLINIC | Age: 50
End: 2025-03-28

## 2025-03-28 VITALS
OXYGEN SATURATION: 97 % | SYSTOLIC BLOOD PRESSURE: 127 MMHG | TEMPERATURE: 98 F | RESPIRATION RATE: 20 BRPM | HEART RATE: 80 BPM | DIASTOLIC BLOOD PRESSURE: 72 MMHG

## 2025-03-28 DIAGNOSIS — J02.9 VIRAL PHARYNGITIS: Primary | ICD-10-CM

## 2025-03-28 DIAGNOSIS — B34.9 VIRAL ILLNESS: ICD-10-CM

## 2025-03-28 LAB — S PYO AG THROAT QL: NEGATIVE

## 2025-03-28 PROCEDURE — 99213 OFFICE O/P EST LOW 20 MIN: CPT | Performed by: NURSE PRACTITIONER

## 2025-03-28 PROCEDURE — 87880 STREP A ASSAY W/OPTIC: CPT | Performed by: NURSE PRACTITIONER

## 2025-03-28 NOTE — ED PROVIDER NOTES
History     Chief Complaint   Patient presents with    Sore Throat       Subjective:   HPI    Brigitte Gregory, 49 year old female with notable medical history of HTN, DM, HAFLD, overweight who presents with sore throat. Patient was seen in IC on 3/24/25 and Dx with Flu B and bronchitis. She reports her cough has improved with Delsym and Zicam, but reports development of sore throat. She reports looking at her tonsils last night and noticing white spots. Denies fever, GREER. Patient is a .      Patient Active Problem List   Diagnosis    HTN (hypertension)    Psoriasis    Nonalcoholic fatty liver disease without nonalcoholic steatohepatitis (DEL CASTILLO)    LBBB (left bundle branch block)    Anemia    Status post total abdominal hysterectomy and bilateral salpingo-oophorectomy (LAN-BSO)    Facial rash      Objective:   Past Medical History:    Acanthosis nigricans    BV (bacterial vaginosis)    Carpal tunnel syndrome    Diabetes (HCC)    Endometriosis, site unspecified    External hemorrhoids without mention of complication    Gastritis    mild    Headache(784.0)    Helicobacter pylori (H. pylori)    H. pylori (+)    High-density lipoprotein deficiency    \"low HDL\"    Hypertension    Obesity    Other ill-defined conditions(799.89)    \"Microcytic indices; Needs labs done for iron studies and/or thalassemia!!\"    Other psoriasis    Pancreatitis (HCC)    Paresthesia    PONV (postoperative nausea and vomiting)    Unspecified essential hypertension    also hx essential HTN, benign    Unspecified pruritic disorder    Vertigo              Past Surgical History:   Procedure Laterality Date    Angiogram  2015    Cholecystectomy  2005    laparoscopic, Dr. Mcpherson    Colonoscopy      Hysterectomy  2019    Laparoscopy procedure unlisted  1995    Laparoscopy    Laparoscopy, surg, w/vaginal hysterectomy, uterus >250gms; w/remove tube(s) &/or ovary(s)  08/13/2019    saranya Luna, for large and growing fibroid that proved  benign                Social History     Socioeconomic History    Marital status:     Number of children: 0   Occupational History    Occupation: Professor     Employer: [employer]   Tobacco Use    Smoking status: Never     Passive exposure: Never    Smokeless tobacco: Never   Vaping Use    Vaping status: Never Used   Substance and Sexual Activity    Alcohol use: No    Drug use: No    Sexual activity: Not Currently     Partners: Male   Other Topics Concern    Caffeine Concern No    Occupational Exposure No    Sleep Concern No    Stress Concern Yes     Comment: My shortness of breath issues.    Weight Concern Yes    Special Diet Yes    Exercise Yes    Seat Belt Yes    Self-Exams Yes     Social Drivers of Health     Food Insecurity: No Food Insecurity (2/21/2025)    NCSS - Food Insecurity     Worried About Running Out of Food in the Last Year: No     Ran Out of Food in the Last Year: No   Transportation Needs: No Transportation Needs (2/21/2025)    NCSS - Transportation     Lack of Transportation: No   Housing Stability: Not At Risk (2/21/2025)    NCSS - Housing/Utilities     Has Housing: Yes     Worried About Losing Housing: No     Unable to Get Utilities: No              Medications Ordered Prior to Encounter[1]      Constitutional and vital signs reviewed.      All other systems reviewed and negative except as noted above.    I have reviewed the family history, social history, allergies, and outpatient medications.     History reviewed from EMR: Encounters, problem list, allergies, medications      Physical Exam     ED Triage Vitals [03/28/25 0911]   /72   Pulse 80   Resp 20   Temp 98.2 °F (36.8 °C)   Temp src Oral   SpO2 97 %   O2 Device None (Room air)       Current:/72   Pulse 80   Temp 98.2 °F (36.8 °C) (Oral)   Resp 20   LMP 07/21/2019 (Exact Date)   SpO2 97%       Physical Exam  Vitals and nursing note reviewed.   Constitutional:       General: She is not in acute distress.      Appearance: Normal appearance. She is overweight. She is not ill-appearing or toxic-appearing.   HENT:      Head: Normocephalic and atraumatic.      Right Ear: External ear normal.      Left Ear: External ear normal.      Nose: Congestion present.      Mouth/Throat:      Mouth: Mucous membranes are moist.      Pharynx: Oropharynx is clear. Uvula midline.      Tonsils: No tonsillar exudate. 1+ on the right. 1+ on the left.      Comments: Narrow oropharynx. No radha tonsillar exudate.  Eyes:      Extraocular Movements: Extraocular movements intact.      Conjunctiva/sclera: Conjunctivae normal.      Pupils: Pupils are equal, round, and reactive to light.   Cardiovascular:      Rate and Rhythm: Normal rate.      Pulses: Normal pulses.   Pulmonary:      Effort: Pulmonary effort is normal. No respiratory distress.      Comments: +cough. No distress, radha wheezing or rhonchi.  Musculoskeletal:         General: No swelling, tenderness or signs of injury. Normal range of motion.      Cervical back: Normal range of motion and neck supple.   Skin:     General: Skin is warm and dry.      Capillary Refill: Capillary refill takes less than 2 seconds.      Coloration: Skin is not jaundiced.      Comments: Flat, erythematous rash to diffuse face (suspect psoriasis)   Neurological:      General: No focal deficit present.      Mental Status: She is alert and oriented to person, place, and time. Mental status is at baseline.   Psychiatric:         Mood and Affect: Mood normal.         Behavior: Behavior normal. Behavior is cooperative.         Thought Content: Thought content normal.         Judgment: Judgment normal.            ED Course     Labs Reviewed   POCT RAPID STREP - Normal     No orders to display       Vitals:    03/28/25 0911   BP: 127/72   Pulse: 80   Resp: 20   Temp: 98.2 °F (36.8 °C)   TempSrc: Oral   SpO2: 97%            [facility]        Brigitte Mares, 49 year old female with medical history as noted above who presents  with sore throat   - Patient in NAD, Vss, appears ill   - strep vs viral sequela vs other new viral vs other   - Strep swab obtained       ** See ED course below for additional information on care provided / interventions / notable events throughout patient's encounter.    ** See Home Care Instructions below for care measures to trial as applicable.    ED Course as of 03/28/25 0952  ------------------------------------------------------------  Time: 03/28 0949  Comment: Strep negative  Supportive care and infection control measures discussed  Follow up with primary care provider as needed        ** I have independently reviewed the radiology images, clinical lab results, and ECG tracings as described above (if applicable)    ** Concerning co-morbidities possibly affecting complaint / care: DM, HTN    ** See disposition & plan section below for home care instructions - if applicable        Medical Decision Making  Amount and/or Complexity of Data Reviewed  Labs: ordered. Decision-making details documented in ED Course.    Risk  OTC drugs.        Disposition and Plan     Disposition:  Discharge  3/28/2025  9:51 am    Clinical Impression:  1. Viral pharyngitis    2. Viral illness            Home care instructions:      Viral Illness supportive care measures to try as applicable:  General:   - There are multiple viruses that cause similar symptoms, including: Influenza, Rhinovirus, Adenovirus, Coronaviruses (including Covid-19), RSV, parainfluenza, etc.   - Duration of symptoms typically depends on your body's ability to heal itself, which can be affected in many ways including metabolic health, diet, and genetics.    - Symptoms typically last between 7-days to 3-weeks   - Most medications do not not cure the illness, but may help to alleviate your symptoms. However, evidence is not strong.   - Antibiotics are NOT effective for viral illnesses   - Drink plenty of fluids (water, Pedialyte, etc.)   - Get plenty of rest    - Take a multivitamin and extra Vitamin D (~2000IU+) daily, year round   - Vitamin C can help reduce symptoms if you become infected, but is more effective if taken before becoming ill   - You may benefit from Zinc (~20mg) every day, or every other other day, for a week while sick (Zinc has been shown to kill respiratory viruses)   - Alternate Naproxen / Aleve (adult: 440-500mg) & Tylenol (adult: 650-1000mg) as needed for pain / body aches / fever   - Limit excess sugar intake (can worsen inflammation caused by virus)    Infection Control:   - Wash hands often, change toothbrush, & disinfect \"high-touch\" items to limit viral spread   - Do not share utensils or drinks    Sore throat:   - Salt water gargles & Lozenges (Cepacol or Ricola)    Sinus:   - Using saline spray or a couple drops into nostrils a couple times a day can help with sinus inflammation & move mucus   - Avoid having air blow on your face as this can worsen congestion   - You may benefit from placing a garlic clove in each nostril for 10-15min which should irritate sinuses, causing you to get rid of stuck mucus. Blow nose afterwards.   - You may benefit from taking a decongestant (e.g. Sudafed - pseudoephedrine [behind the pharmacy counter]) (may temporarily elevate your heart rate and blood pressure)   - You may benefit from using a humidifier and/or steam showers then blow nose   - You may benefit from Flonase nasal spray daily (use with head tilted down and tip pointed towards outside of eye. Breath normally. You should not feel medication go down your throat)   - You may benefit from taking a daily allergy medication (e.g. Zyrtec, Xyzal, etc.)   - You may benefit from boiling water with lemon and cayenne pepper, then breathing in the steam (you can cover your head with a towel to help funnel the steam)    Cough:   - You may benefit from spoonfuls of honey (or added to warm drinks) throughout the day   - You may benefit from cough medication  containing \"Dextromethorphan [DM]\" (e.g. Delsym)   - Sleeping in an upright position        Follow-up:  Nicolette Hernandez MD  [address]  [address]  [place] [address]  [phone]      As needed          Medications Prescribed:  Current Discharge Medication List            Brad Storey, DNP, APRN, AGACNP-BC, FNP-C, CNL  Adult-Gerontology Acute Care & Family Nurse Practitioner  [facility]      The above patient (and/or guardian) was made aware that an appropriate evaluation has been performed, and that no additional testing is required at this time. In my medical judgment, there is currently no evidence of an immediate life-threatening or surgical condition, therefore discharge is indicated at this time. The patient (and/or guardian) was advised that a small risk still exists that a serious condition could develop. The patient was instructed to arrange close follow-up with their primary care provider (or the referral provider given today). The patient received written and verbal instructions regarding their condition / concerns, demonstrated understanding, and is agreement with the outpatient treatment plan.            [1]   No current facility-administered medications on file prior to encounter.     Current Outpatient Medications on File Prior to Encounter   Medication Sig Dispense Refill    lisinopril 10 MG Oral Tab Take 1 tablet (10 mg total) by mouth in the morning and 1 tablet (10 mg total) before bedtime. 180 tablet 1    lisinopril-hydroCHLOROthiazide 20-25 MG Oral Tab Take 1 tablet by mouth every morning. 90 tablet 1    EPINEPHrine (EPIPEN 2-ALEXANDER) 0.3 MG/0.3ML Injection Solution Auto-injector Use as directed 1 each 1

## 2025-03-28 NOTE — DISCHARGE INSTRUCTIONS
Viral Illness supportive care measures to try as applicable:  General:   - There are multiple viruses that cause similar symptoms, including: Influenza, Rhinovirus, Adenovirus, Coronaviruses (including Covid-19), RSV, parainfluenza, etc.   - Duration of symptoms typically depends on your body's ability to heal itself, which can be affected in many ways including metabolic health, diet, and genetics.    - Symptoms typically last between 7-days to 3-weeks   - Most medications do not not cure the illness, but may help to alleviate your symptoms. However, evidence is not strong.   - Antibiotics are NOT effective for viral illnesses   - Drink plenty of fluids (water, Pedialyte, etc.)   - Get plenty of rest   - Take a multivitamin and extra Vitamin D (~2000IU+) daily, year round   - Vitamin C can help reduce symptoms if you become infected, but is more effective if taken before becoming ill   - You may benefit from Zinc (~20mg) every day, or every other other day, for a week while sick (Zinc has been shown to kill respiratory viruses)   - Alternate Naproxen / Aleve (adult: 440-500mg) & Tylenol (adult: 650-1000mg) as needed for pain / body aches / fever   - Limit excess sugar intake (can worsen inflammation caused by virus)    Infection Control:   - Wash hands often, change toothbrush, & disinfect \"high-touch\" items to limit viral spread   - Do not share utensils or drinks    Sore throat:   - Salt water gargles & Lozenges (Cepacol or Ricola)    Sinus:   - Using saline spray or a couple drops into nostrils a couple times a day can help with sinus inflammation & move mucus   - Avoid having air blow on your face as this can worsen congestion   - You may benefit from placing a garlic clove in each nostril for 10-15min which should irritate sinuses, causing you to get rid of stuck mucus. Blow nose afterwards.   - You may benefit from taking a decongestant (e.g. Sudafed - pseudoephedrine [behind the pharmacy counter]) (may  temporarily elevate your heart rate and blood pressure)   - You may benefit from using a humidifier and/or steam showers then blow nose   - You may benefit from Flonase nasal spray daily (use with head tilted down and tip pointed towards outside of eye. Breath normally. You should not feel medication go down your throat)   - You may benefit from taking a daily allergy medication (e.g. Zyrtec, Xyzal, etc.)   - You may benefit from boiling water with lemon and cayenne pepper, then breathing in the steam (you can cover your head with a towel to help funnel the steam)    Cough:   - You may benefit from spoonfuls of honey (or added to warm drinks) throughout the day   - You may benefit from cough medication containing \"Dextromethorphan [DM]\" (e.g. Delsym)   - Sleeping in an upright position

## 2025-03-28 NOTE — TELEPHONE ENCOUNTER
Pt seen today in [facility] 2/21/25    Dr. Hernandez - Please advise if you would provide letter for pt

## 2025-03-28 NOTE — ED INITIAL ASSESSMENT (HPI)
Pt has 12 days of a URI and yesterday developed a bad sore throat.  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

## 2025-05-12 ENCOUNTER — PATIENT MESSAGE (OUTPATIENT)
Facility: CLINIC | Age: 50
End: 2025-05-12

## 2025-05-29 ENCOUNTER — TELEMEDICINE (OUTPATIENT)
Facility: CLINIC | Age: 50
End: 2025-05-29
Payer: COMMERCIAL

## 2025-05-29 DIAGNOSIS — E11.65 TYPE 2 DIABETES MELLITUS WITH HYPERGLYCEMIA, WITHOUT LONG-TERM CURRENT USE OF INSULIN (HCC): Primary | ICD-10-CM

## 2025-05-29 DIAGNOSIS — E66.01 CLASS 2 SEVERE OBESITY DUE TO EXCESS CALORIES WITH SERIOUS COMORBIDITY AND BODY MASS INDEX (BMI) OF 39.0 TO 39.9 IN ADULT (HCC): ICD-10-CM

## 2025-05-29 DIAGNOSIS — Z87.19 HISTORY OF PANCREATITIS: ICD-10-CM

## 2025-05-29 DIAGNOSIS — E66.812 CLASS 2 SEVERE OBESITY DUE TO EXCESS CALORIES WITH SERIOUS COMORBIDITY AND BODY MASS INDEX (BMI) OF 39.0 TO 39.9 IN ADULT (HCC): ICD-10-CM

## 2025-05-29 PROCEDURE — 98007 SYNCH AUDIO-VIDEO EST HI 40: CPT

## 2025-05-29 RX ORDER — TIRZEPATIDE 2.5 MG/.5ML
2.5 INJECTION, SOLUTION SUBCUTANEOUS WEEKLY
Qty: 2 ML | Refills: 0 | Status: SHIPPED | OUTPATIENT
Start: 2025-05-29

## 2025-05-29 NOTE — PROGRESS NOTES
[facility] Note - Telemedicine    Brigitte Gregory verbally consents to a telehealth visit (video + audio) on 5/29/2025. The visit was conducted in a private room.     Patient understands and accepts financial responsibility for any deductible, co-insurance and/or co-pays associated with this service.      Subjective:   History of Present Illness  Brigitte Gregory is a 49 year old female with type 2 diabetes who presents with concerns about weight management and diabetes control.    She is experiencing difficulty with weight loss and is concerned about her A1c levels, which she will check in two weeks. She has not been monitoring her blood sugars due to an aversion to needles and is not currently on any diabetes medication.    She has a history of pancreatitis related to gallstones, for which she underwent cholecystectomy, and has not had any episodes since. She also has a history of fatty liver and is currently on blood pressure medication. She has psoriasis, which has recently spread to her abdomen and thighs. She is concerned about how her condition might be affected by potential treatments.    She is experiencing significant frustration due to her inability to lose weight, which she feels is impacting various aspects of her life. She is currently meeting with a counselor to address these concerns.    She has a history of severe hair loss related to an autoimmune condition and is concerned about potential exacerbation of this issue with weight loss.  No gastroparesis or cyclic vomiting problems. No episodes of pancreatitis since her gallbladder removal.    Going to incorporate more tennis- plans to play every Wednesday. Off of work for the summer. Seeing a therapist which is helping discuss mental health. Excited for a trip to [facility] coming up,  6/22    DM meds at office visit: none     History/Other:    Allergies, PMH, SocHx and FHx reviewed and updated as appropriate in Epic on Current  Medications[1]  Allergies[2]  Current Medications[3]  Past Medical History[4]  Family History[5]  Social history: Reviewed.      ROS/Exam    REVIEW OF SYSTEMS: Ten point review of systems has been performed and is otherwise negative and/or non-contributory, except as described above.     VITALS  There were no vitals filed for this visit.    There is no height or weight on file to calculate BMI.  Wt Readings from Last 6 Encounters:   02/21/25 210 lb (95.3 kg)   02/20/25 210 lb (95.3 kg)   11/15/24 210 lb 2 oz (95.3 kg)   11/08/24 212 lb (96.2 kg)   06/06/24 210 lb (95.3 kg)   02/23/24 209 lb (94.8 kg)       PHYSICAL EXAM  Limited due to telemedicine encounter    Constitutional Not in acute distress  Pulmonary: no wheezing heard  No coughing on the phone. Speaking in full sentences   Neurological:  Alert and oriented to person, place and time.   Psychiatric: Normal affect, mood and behavior appropriate    Labs/Imaging: Pertinent imaging reviewed.    Thyroid labs (if present in chart):  Recent Labs     11/17/23  1105 06/04/24  0921   TSH 2.560 3.010        Thyroid ultrasound results (if present in chart):  No results found.    Overall glucose control:  Lab Results   Component Value Date    A1C 7.3 (H) 02/19/2025    A1C 7.4 (H) 06/04/2024    A1C 7.4 (H) 02/16/2024    A1C 9.4 (H) 12/13/2023    A1C 12.2 (H) 11/17/2023       Supplementary Documentation:   -Surveillance for Diabetes Complications & Risks  Foot exam/neuropathy: No data recorded  Retinopathy screening: No data recordedNo data recorded  Lipid screening:   Lab Results   Component Value Date     (H) 06/04/2024    TRIG 188 (H) 06/04/2024   Cholesterol Meds:       Nephropathy screening:   Lab Results   Component Value Date    EGFRCR 88 06/04/2024    MICROALBCREA 5.6 02/16/2024   No results found for: \"CREAUR\", \"MICROALBUMIN\", \"MALBCREACALC\"  Blood pressure control:   BP Readings from Last 1 Encounters:   03/28/25 127/72   BP Meds: lisinopril Tabs - 10 MG;  lisinopril-hydroCHLOROthiazide Tabs - 20-25 MG       Assessment & Plan:   Overview:   1. Type 2 diabetes mellitus with hyperglycemia, without long-term current use of insulin ([facility]) (Primary)  -     Hemoglobin A1C; Future; Expected date: 05/29/2025  -     TSH and Free T4; Future; Expected date: 05/29/2025  -     Mounjaro; Inject 2.5 mg into the skin once a week.  Dispense: 2 mL; Refill: 0  2. Class 2 severe obesity due to excess calories with serious comorbidity and body mass index (BMI) of 39.0 to 39.9 in adult (HCC)  -     Mounjaro; Inject 2.5 mg into the skin once a week.  Dispense: 2 mL; Refill: 0  3. History of pancreatitis      Brigitte Gregory is a pleasant 49 year old female here for evaluation of:    Assessment & Plan  Type 2 Diabetes Mellitus with Hyperglycemia  She is struggling with weight loss and is concerned about her A1c levels, which will be tested in two weeks. She is not on any diabetes medication and avoids checking blood sugars due to needle aversion. GLP-1 receptor agonists, such as Mounjaro and Ozempic, were discussed for diabetes management and weight loss. These medications reduce insulin resistance, promote insulin production, and decrease appetite, leading to weight loss and improved diabetes control. Potential side effects, including nausea, were discussed, with a plan to start at a low dose to minimize these effects. Insurance coverage needs verification, with Mounjaro preferred for its efficacy and lower nausea incidence, but Ozempic is an option if Mounjaro is not covered.     - start mounjaro 2.5mg weekly x 4 wks  - will check in via my chart message in 3 weeks to see how it's going   - test A1C prior to next office visit   - aim for minimum 70g of protein/day based on RDA, ok to intake more   - discussed strength / resistance training, she is going to incorporate more tennis  - Discussed adding GLP-1 to current medication regimen. Discussed action, risk vs benefit, dosing, and  potential side effects. Patient denies hx of pancreatitis, gastroparesis, or personal or family hx of medullary thyroid CA or MEN 2.  Patient is post menopausal.       History of pancreatitis  She had gallstone-related pancreatitis and has since had her gallbladder removed, with no subsequent episodes. The risk of pancreatitis recurrence with GLP-1 receptor agonists is low given the known etiology of the previous episode. The benefits of GLP-1 receptor agonists in managing diabetes and weight outweigh the potential risk of pancreatitis.  - Monitor for any signs of pancreatitis recurrence while on GLP-1 receptor agonists.       Recording duration: 13 minutes (recording was cut short due to needing Laura for video visit capabilities)    Updated DM med list:   Diabetic Medications              Tirzepatide (MOUNJARO) 2.5 MG/0.5ML Subcutaneous Solution Auto-injector (Taking) Inject 2.5 mg into the skin once a week.          See above header \"Supplementary Documentation\" for surveillance for diabetes complications & risks    Return in about 6 weeks (around 7/10/2025) for Mounjaro dose follow up.    A total of 45 minutes was spent today on counseling at length about GLP start, obtaining history, reviewing pertinent labs, evaluating patient, providing multiple treatment options, reinforcing diet/exercise and compliance, and completing documentation.     TYRONE Huerta  Endocrinology, Diabetes & Metabolism   5/29/2025    Note to patient: The 21 Century Cures Act makes medical notes like these available to patients in the interest of transparency. However, be advised this is a medical document. It is intended as peer to peer communication. It is written in medical language and may contain abbreviations or verbiage that are unfamiliar. It may appear blunt or direct. Medical documents are intended to carry relevant information, facts as evident, and the clinical opinion of the practitioner.          The following  individual(s) verbally consented to be recorded using ambient AI listening technology and understand that they can each withdraw their consent to this listening technology at any point by asking the clinician to turn off or pause the recording:    Patient name: Brigitte Gregory          [1]    Tirzepatide (MOUNJARO) 2.5 MG/0.5ML Subcutaneous Solution Auto-injector Inject 2.5 mg into the skin once a week. 2 mL 0   [2]   Allergies  Allergen Reactions    Codeine RASH    Seafood ANAPHYLAXIS and SWELLING     Angioedema      Shellfish-Derived Products ANAPHYLAXIS and RASH    Olmesartan Medoxomil-Hctz PAIN     Headaches daily, rash    Enbrel RASH    Irbesartan OTHER (SEE COMMENTS)     Headache, dizziness   [3]   Current Outpatient Medications   Medication Sig Dispense Refill    Tirzepatide (MOUNJARO) 2.5 MG/0.5ML Subcutaneous Solution Auto-injector Inject 2.5 mg into the skin once a week. 2 mL 0    lisinopril 10 MG Oral Tab Take 1 tablet (10 mg total) by mouth in the morning and 1 tablet (10 mg total) before bedtime. 180 tablet 1    lisinopril-hydroCHLOROthiazide 20-25 MG Oral Tab Take 1 tablet by mouth every morning. 90 tablet 1    EPINEPHrine (EPIPEN 2-ALEXANDER) 0.3 MG/0.3ML Injection Solution Auto-injector Use as directed 1 each 1   [4]   Past Medical History:   Acanthosis nigricans    BV (bacterial vaginosis)    Carpal tunnel syndrome    Diabetes (HCC)    Endometriosis, site unspecified    External hemorrhoids without mention of complication    Gastritis    mild    Headache(784.0)    Helicobacter pylori (H. pylori)    H. pylori (+)    High-density lipoprotein deficiency    \"low HDL\"    Hypertension    Obesity    Other ill-defined conditions(799.89)    \"Microcytic indices; Needs labs done for iron studies and/or thalassemia!!\"    Other psoriasis    Pancreatitis (HCC)    Paresthesia    PONV (postoperative nausea and vomiting)    Unspecified essential hypertension    also hx essential HTN, benign    Unspecified pruritic  disorder    Vertigo   [5]   Family History  Problem Relation Age of Onset    Diabetes Mother     Other (digestive disorder) Mother         gallbladder    Other (HTN) Mother     Hypertension Mother     Other (digestive disorder) Sister         gallbladder    Other (digestive disorder) Other         gallbladder, grandmother    Diabetes Other         mother's side, aunts/uncles    Other (kidney failure) Other         aunt and uncle, d/t DM

## 2025-05-29 NOTE — PATIENT INSTRUCTIONS
Return Visit:  With TYRONE Huerta: Return in about 6 weeks (around 7/10/2025) for Mounjaro dose follow up.      VISIT SUMMARY:  During your visit, we discussed your concerns about weight management and diabetes control. We reviewed potential treatments for your type 2 diabetes, including GLP-1 receptor agonists, and addressed your psoriasis, history of pancreatitis, and fatty liver condition.    YOUR PLAN:  TYPE 2 DIABETES MELLITUS WITH HYPERGLYCEMIA: You are struggling with weight loss and are concerned about your A1c levels. - start mounjaro 2.5mg weekly x 4 wks  - will check in via my chart message in 3 weeks to see how it's going   - test A1C prior to next office visit   - aim for minimum 70g of protein/day       Contains text generated by [employer]     One of the most common side effects of GLP1 medications (including Mounjaro, Ozempic, Trulicity, Rybelsus) is nausea and GI upset.    Try to continue taking your medication. Side effects will likely get better with time.  Here is some advice to reduce these side effects:  - if you're having nausea and ate a large meal before the nausea, the volume of food may be too much for your stomach to handle, reduce meal size  - practice mindfulness to stop eating once full  - avoid eating when not hungry  - avoid high-fat or spicy food (particularly during the initial dose-escalation period)  - moderate your intake of alcohol and fizzy drinks (particularly in the context of nausea and heartburn)  - If you have constipation at baseline, make sure you are drinking enough water and eating enough fiber  - if you notice some nausea symptoms and haven't eaten in a while, make sure you are eating at least three meals per day, can try snacking on high protein snack item like yogurt, string cheese, cottage cheese, protein shake  - If you have made these changes and you still notice nausea, please contact the clinic      Updated diabetes medication instructions from today:    Diabetic Medications              Tirzepatide (MOUNJARO) 2.5 MG/0.5ML Subcutaneous Solution Auto-injector (Taking) Inject 2.5 mg into the skin once a week.            Lab Results   Component Value Date    A1C 7.3 (H) 02/19/2025    A1C 7.4 (H) 06/04/2024    A1C 7.4 (H) 02/16/2024    A1C 9.4 (H) 12/13/2023    A1C 12.2 (H) 11/17/2023        General follow up information:  Please let us know if you require any refills at least 1 week prior to your medication running out. If you do run out of medication, please call our office ASAP to request refills (do not wait until your follow up).   Please call our office if sugars at home are consistently greater than 250 or less than 70 for medication adjustment (do not wait until your follow up appointment).  Lab results and imaging will typically be reviewed at follow up appointments, or within 3-5 business days of ALL results being in if you do not have an appointment scheduled in the near future. Our office will contact you for any abnormal results requiring more urgent follow up or action.   The on-call pager is for urgent matters only. If you are a type 1 diabetic and run out of insulin after business hours 8AM-4PM, you may call the on-call pager for a refill to a 24 hour pharmacy.  If you have adrenal insufficiency and run out of steroids, you may call the on-call pager for a refill to a 24 hour pharmacy. All other refill requests should be requested during business hours.    Office phone number: [phone]; phones are open Monday-Friday 8:30-4:30.       HOW TO TREAT LOW BLOOD SUGAR (Hypoglycemia)  Low blood sugar= Less than 70, or if you start to have symptoms (below)  Symptoms: Shaking or trembling, fast heart rate, extreme hunger, sweating, confusion/difficulty concentrating, dizziness.    How to treat a low blood sugar if you are able to eat/drink: The Rule of 15/15  If you are using continuous glucose monitor that says you are low, but you do not have any symptoms,  verify on fingerstick that your blood sugar is actually low before treating.   Eat 15 grams of carbs (see examples below)  Check your blood sugar after 15 minutes. If it’s still below your target range, have another serving.   Repeat these steps until it’s in your target range. Once it’s in range, if you're nervous about your sugar going low again, have a protein source (ie, a spoonful of peanut butter).   If you have a CGM you want to look for how your arrow has changed. If you arrow is pointed up or sideways after 15 min, give your CGM more time OR check with a finger stick. Try not to eat more food until at least 15 min after the first BG check - otherwise you risk having a rebound high.  If you are experiencing symptoms and you are unable to check your blood glucose for any reason, treat the hypoglycemia.  If someone has a low blood sugar and is unconscious: Don’t hesitate to call 911. If someone is unconscious and glucagon is not available or someone does not know how to use it, call 911 immediately.     To treat a low, I recommend you carry with you easy, pre-portioned treatment for low blood sugars that are 15G of carbs:   - Children sized squeeze pouch applesauce (high fiber + carbs help prevent too high of a spike)  - Small children's sized juicebox- 15g carb --> 4oz juice box  - Glucose tablets from [employer], you can find them near diabetes supplies --> Note, you will need to eat 3-4 tablets to get to 15g of carbs  - Children sized fruit snack pack- look for one with 15 grams of total carbohydrate  - Choice of how to treat your low is important. Complex carbs, or foods that contain fats along with carbs (like chocolate) can slow the absorption of glucose and should NOT be used to treat an emergency low

## 2025-06-12 ENCOUNTER — NURSE TRIAGE (OUTPATIENT)
Dept: FAMILY MEDICINE CLINIC | Facility: CLINIC | Age: 50
End: 2025-06-12

## 2025-06-12 NOTE — TELEPHONE ENCOUNTER
Action Requested: Summary for Provider     []  Critical Lab, Recommendations Needed  [] Need Additional Advice  []   FYI    []   Need Orders  [] Need Medications Sent to Pharmacy  []  Other     SUMMARY: Offered appointment Monday with Dr. Hernandez, patient declined and wants to be seen today. Patient will go to  today for evaluation and follow up here as needed    Reason for call: Knee Pain  Onset: yesterday   Reason for Disposition   Patient wants to be seen    Protocols used: Knee Pain-A-[place]      Started playing tennis yesterday  Was fine during her match, but after developed severe pain to R knee   Patient noted she screamed due to the pain   Rested and took it easy  Also developed pain in L knee  Denies swelling and redness  Able to walk but taking it slowly  No injuries that she noted while playing tennis  Feels like knees are \"grinding\" when she is walking up the stairs  Offered appointment Monday with Dr. Hernandez. Patient wants to be seen today. Aware Dr. Hernandez not in the office. Patient will go to  today for evaluation. Will follow up here as needed

## 2025-06-13 ENCOUNTER — APPOINTMENT (OUTPATIENT)
Dept: GENERAL RADIOLOGY | Age: 50
End: 2025-06-13
Attending: Physician Assistant
Payer: COMMERCIAL

## 2025-06-13 ENCOUNTER — HOSPITAL ENCOUNTER (OUTPATIENT)
Age: 50
Discharge: HOME | End: 2025-06-13
Payer: COMMERCIAL

## 2025-06-13 VITALS
OXYGEN SATURATION: 96 % | HEART RATE: 80 BPM | TEMPERATURE: 98 F | DIASTOLIC BLOOD PRESSURE: 62 MMHG | SYSTOLIC BLOOD PRESSURE: 132 MMHG | RESPIRATION RATE: 18 BRPM

## 2025-06-13 DIAGNOSIS — M25.561 ACUTE PAIN OF BOTH KNEES: Primary | ICD-10-CM

## 2025-06-13 DIAGNOSIS — M17.0 OSTEOARTHRITIS OF BOTH KNEES, UNSPECIFIED OSTEOARTHRITIS TYPE: ICD-10-CM

## 2025-06-13 DIAGNOSIS — M25.562 ACUTE PAIN OF BOTH KNEES: Primary | ICD-10-CM

## 2025-06-13 PROCEDURE — 73560 X-RAY EXAM OF KNEE 1 OR 2: CPT | Performed by: PHYSICIAN ASSISTANT

## 2025-06-13 PROCEDURE — 99213 OFFICE O/P EST LOW 20 MIN: CPT | Performed by: PHYSICIAN ASSISTANT

## 2025-06-13 PROCEDURE — A6448 LT COMPRES BAND <3"/YD: HCPCS | Performed by: PHYSICIAN ASSISTANT

## 2025-06-13 NOTE — ED INITIAL ASSESSMENT (HPI)
Pt states played tennis yesterday for the first time in 15 years.  Pt states after started having sharp pain.  States pain with walking.  Pt states pain is better today

## 2025-06-13 NOTE — DISCHARGE INSTRUCTIONS
Rest, ice, and elevate knees   Alternate Advil (ibuprofen) / Tylenol (acetaminophen) as needed for pain   Drink plenty of fluids   Get plenty of rest   Avoid excessive twisting, bending, or lifting     To schedule an appointment with the Orthopedic and Sports Medicine department; please text or call [phone] and choose option 3 when prompted. If your insurance requires a referral, please contact your primary care provider first.

## 2025-06-13 NOTE — ED PROVIDER NOTES
Chief Complaint   Patient presents with    Knee Pain       History obtained from: patient   services not used     HPI:     Brigitte Gregory is a 49 year old female who presents with knee pain since yesterday. Patient states she had intense hour-long tennis lesson and afterwards developed sharp, severe pain in right knee while ascending stairs. Patient states it felt like her right knee \"gave out.\" Patient states she has been hearing a \"clicking\" in right knee while ascending stairs for quite some time. Patient also developed pain in left knee last night but states it is not as severe as in right knee. Patient states she felt no pain in her knees prior to her tennis lesson or during her tennis lesson. Patient states she recently restarted playing tennis. No medications taken or treatments tried. No hx of previous knee surgery. Denies fall, swelling, numbness, weakness, wound, change in skin color/temperature, hip pain, calf pain, ankle pain, foot pain.     PMH  Past Medical History[1]    PFSH    PFSH asessment screens reviewed and agree.  Nurses notes reviewed I agree with documentation.    Family History[2]  Family history reviewed with patient/caregiver and is not pertinent to presenting problem.  Social History     Socioeconomic History    Marital status:      Spouse name: Not on file    Number of children: 0    Years of education: Not on file    Highest education level: Not on file   Occupational History    Occupation: Professor     Employer: [employer]   Tobacco Use    Smoking status: Never     Passive exposure: Never    Smokeless tobacco: Never   Vaping Use    Vaping status: Never Used   Substance and Sexual Activity    Alcohol use: No    Drug use: No    Sexual activity: Not Currently     Partners: Male   Other Topics Concern     Service Not Asked    Blood Transfusions Not Asked    Caffeine Concern No    Occupational Exposure No    Hobby Hazards Not Asked    Sleep Concern No     Stress Concern Yes     Comment: My shortness of breath issues.    Weight Concern Yes    Special Diet Yes    Back Care Not Asked    Exercise Yes    Bike Helmet Not Asked    Seat Belt Yes    Self-Exams Yes   Social History Narrative    Not on file     Social Drivers of Health     Food Insecurity: No Food Insecurity (2/21/2025)    NCSS - Food Insecurity     Worried About Running Out of Food in the Last Year: No     Ran Out of Food in the Last Year: No   Transportation Needs: No Transportation Needs (2/21/2025)    NCSS - Transportation     Lack of Transportation: No   Housing Stability: Not At Risk (2/21/2025)    NCSS - Housing/Utilities     Has Housing: Yes     Worried About Losing Housing: No     Unable to Get Utilities: No         ROS:   Positive for stated complaint: knee pain   Other systems are as noted in HPI.   All other systems reviewed and negative except as noted above.    Physical Exam:   Vital signs and nursing note reviewed.       /62   Pulse 80   Temp 98.2 °F (36.8 °C) (Oral)   Resp 18   LMP 07/21/2019 (Exact Date)   SpO2 96%     GENERAL: well developed, no acute distress, non-toxic appearing   SKIN: good skin turgor, chronic psoriatic rash to bilateral arms and legs   HEAD: normocephalic, atraumatic  EYES: sclera non-icteric bilaterally, conjunctiva clear bilaterally  OROPHARYNX: MMM, maintaining airway and secretions  NECK: no nuchal rigidity, no trismus, no edema, phonation normal    CARDIO: regular rate, DP pulse 2+ bilaterally, cap refill < 2 sec   LUNGS: no increased WOB  EXTREMITIES: minimal tenderness to anterior lateral right knee, no tenderness to left knee, no obvious swelling or deformity, FROM, compartments soft, CMS intact, skin intact without overlying warmth or erythema  NEURO: no focal deficits  PSYCH: alert and oriented x3, answering questions appropriately, mood appropriate    MDM/Assessment/Plan:   Orders for this encounter:    Orders Placed This Encounter    XR KNEE (1 OR 2  VIEWS), LEFT (CPT=73560)     What is the Relevant Clinical Indication / Reason for Exam?:   bilateral knee pain after playing tennis yesterday    XR KNEE (1 OR 2 VIEWS), RIGHT (CPT=73560)     What is the Relevant Clinical Indication / Reason for Exam?:   bilateral knee pain after playing tennis yesterday, clicking in right knee       Labs performed this visit:  No results found for this or any previous visit (from the past 10 hours).    Imaging performed this visit:  XR KNEE (1 OR 2 VIEWS), RIGHT (CPT=73560)   Final Result   PROCEDURE:  XR KNEE (1 OR 2 VIEWS), RIGHT (CPT=73560)       COMPARISON:  None.       INDICATIONS:  bilateral knee pain after playing tennis yesterday, clicking    in right knee       PATIENT STATED HISTORY: (As transcribed by Technologist)  Patient played    tennis yesterday and today bilateral knee pain.            FINDINGS:     BONES:  There is no fracture.  There is mild endplate osteophyte formation    at the patellofemoral compartment of knee joint and medial compartment.   SOFT TISSUES:  Negative.  No visible soft tissue swelling.   EFFUSION:  None visible.   OTHER:  Negative.                         =====   CONCLUSION:  There is mild osteoarthritis.  There is no acute abnormality.           LOCATION:  [facility]           Dictated by (CST): Elliot Davidson MD on 6/13/2025 at 11:06 AM        Finalized by (CST): Elliot Davidson MD on 6/13/2025 at 11:06 AM         XR KNEE (1 OR 2 VIEWS), LEFT (CPT=73560)   Final Result   PROCEDURE:  XR KNEE (1 OR 2 VIEWS), LEFT (CPT=73560)       COMPARISON:  None.       INDICATIONS:  bilateral knee pain after playing tennis yesterday       PATIENT STATED HISTORY: (As transcribed by Technologist)  Patient played    tennis yesterday and today bilateral knee pain.            FINDINGS:     BONES:  There is mild joint space narrowing medial compartment and    patellofemoral compartment with mild marginal osteophyte formation.   SOFT TISSUES:  Negative.  No visible soft  tissue swelling.   EFFUSION:  None visible.   OTHER:  Negative.                         =====   CONCLUSION:  There is osteoarthritis in the left knee.           LOCATION:  [facility]           Dictated by (CST): Elliot Davidson MD on 6/13/2025 at 11:07 AM        Finalized by (CST): Elliot Davidson MD on 6/13/2025 at 11:07 AM             Medical Decision Making  DDx includes osteoarthritis versus knee sprain versus meniscus injury versus fracture versus other.  Patient is overall well-appearing with stable vitals presenting with acute bilateral knee pain since yesterday after playing tennis, worse in right knee.  No signs of neurovascular compromise or compartment syndrome.  No overlying skin changes or signs of infection.  X-rays of bilateral knees reviewed, mild osteoarthritis noted, no acute osseous abnormality.  Discussed results with patient.  Ace wrap applied to right knee.  Patient ambulating with steady gait.  Discussed supportive care including rest, ice, elevation, compression, and OTC Advil/Tylenol as needed for pain.  Instructed patient to go directly to nearest ER with any worsening or concerning symptoms.  Follow-up with ortho.    Amount and/or Complexity of Data Reviewed  Radiology: ordered and independent interpretation performed.    Risk  OTC drugs.          Diagnosis:    ICD-10-CM    1. Acute pain of both knees  M25.561 XR KNEE (1 OR 2 VIEWS), LEFT (CPT=73560)    M25.562 XR KNEE (1 OR 2 VIEWS), RIGHT (CPT=73560)     XR KNEE (1 OR 2 VIEWS), LEFT (CPT=73560)     XR KNEE (1 OR 2 VIEWS), RIGHT (CPT=73560)      2. Osteoarthritis of both knees, unspecified osteoarthritis type  M17.0           All results reviewed and discussed with patient/patient's family. Patient/patient's family verbalize excellent understanding of instructions and feels comfortable with plan. All of patient's/patient's family's questions were addressed.   See AVS for detailed discharge instructions for your condition today.    Follow Up  with:  Orthopaedic  Service  Call [phone], option 3 to arrange ortho follow up          Note: This document was dictated using Dragon medical dictation software.  Proofreading was performed to the best of my ability, but errors may be present.    Lanette Wilson PA-C         [1]   Past Medical History:   Acanthosis nigricans    BV (bacterial vaginosis)    Carpal tunnel syndrome    Diabetes (HCC)    Endometriosis, site unspecified    External hemorrhoids without mention of complication    Gastritis    mild    Headache(784.0)    Helicobacter pylori (H. pylori)    H. pylori (+)    High-density lipoprotein deficiency    \"low HDL\"    Hypertension    Obesity    Other ill-defined conditions(799.89)    \"Microcytic indices; Needs labs done for iron studies and/or thalassemia!!\"    Other psoriasis    Pancreatitis (HCC)    Paresthesia    PONV (postoperative nausea and vomiting)    Unspecified essential hypertension    also hx essential HTN, benign    Unspecified pruritic disorder    Vertigo   [2]   Family History  Problem Relation Age of Onset    Diabetes Mother     Other (digestive disorder) Mother         gallbladder    Other (HTN) Mother     Hypertension Mother     Other (digestive disorder) Sister         gallbladder    Other (digestive disorder) Other         gallbladder, grandmother    Diabetes Other         mother's side, aunts/uncles    Other (kidney failure) Other         aunt and uncle, d/t DM

## 2025-06-14 ENCOUNTER — HOSPITAL ENCOUNTER (EMERGENCY)
Facility: HOSPITAL | Age: 50
Discharge: LEFT BEFORE DISCHARGE | End: 2025-06-14
Payer: COMMERCIAL

## 2025-06-14 ENCOUNTER — MOBILE ENCOUNTER (OUTPATIENT)
Facility: CLINIC | Age: 50
End: 2025-06-14

## 2025-06-14 ENCOUNTER — PATIENT MESSAGE (OUTPATIENT)
Facility: CLINIC | Age: 50
End: 2025-06-14

## 2025-06-14 VITALS
OXYGEN SATURATION: 96 % | RESPIRATION RATE: 14 BRPM | HEART RATE: 114 BPM | DIASTOLIC BLOOD PRESSURE: 84 MMHG | TEMPERATURE: 99 F | WEIGHT: 207 LBS | HEIGHT: 61 IN | BODY MASS INDEX: 39.08 KG/M2 | SYSTOLIC BLOOD PRESSURE: 133 MMHG

## 2025-06-14 PROCEDURE — 85025 COMPLETE CBC W/AUTO DIFF WBC: CPT

## 2025-06-14 NOTE — ED INITIAL ASSESSMENT (HPI)
Pt states has a h/o pancreatitis, states started on munjaro recently, started with abdominal pain, N/V/D since yesterday.

## 2025-06-15 ENCOUNTER — HOSPITAL ENCOUNTER (EMERGENCY)
Facility: HOSPITAL | Age: 50
Discharge: HOME | End: 2025-06-15
Attending: EMERGENCY MEDICINE
Payer: COMMERCIAL

## 2025-06-15 ENCOUNTER — HOSPITAL ENCOUNTER (OUTPATIENT)
Age: 50
Discharge: ANOTHER HOSPITAL | End: 2025-06-15
Payer: COMMERCIAL

## 2025-06-15 VITALS
SYSTOLIC BLOOD PRESSURE: 120 MMHG | TEMPERATURE: 98 F | RESPIRATION RATE: 18 BRPM | DIASTOLIC BLOOD PRESSURE: 70 MMHG | HEART RATE: 95 BPM | OXYGEN SATURATION: 97 %

## 2025-06-15 VITALS
RESPIRATION RATE: 16 BRPM | DIASTOLIC BLOOD PRESSURE: 76 MMHG | OXYGEN SATURATION: 100 % | HEART RATE: 86 BPM | SYSTOLIC BLOOD PRESSURE: 132 MMHG

## 2025-06-15 DIAGNOSIS — R11.2 NAUSEA AND VOMITING, UNSPECIFIED VOMITING TYPE: Primary | ICD-10-CM

## 2025-06-15 DIAGNOSIS — R11.2 NAUSEA AND VOMITING IN ADULT: Primary | ICD-10-CM

## 2025-06-15 LAB
ALBUMIN SERPL-MCNC: 5 G/DL (ref 3.2–4.8)
ALBUMIN/GLOB SERPL: 1.6 {RATIO} (ref 1–2)
ALP LIVER SERPL-CCNC: 81 U/L (ref 41–108)
ALT SERPL-CCNC: 26 U/L (ref 10–49)
ANION GAP SERPL CALC-SCNC: 10 MMOL/L (ref 0–18)
AST SERPL-CCNC: 26 U/L (ref ?–34)
BASOPHILS # BLD AUTO: 0.04 X10(3) UL (ref 0–0.2)
BASOPHILS NFR BLD AUTO: 0.5 %
BILIRUB SERPL-MCNC: 0.9 MG/DL (ref 0.3–1.2)
BILIRUB UR QL STRIP.AUTO: NEGATIVE
BUN BLD-MCNC: 18 MG/DL (ref 9–23)
CALCIUM BLD-MCNC: 9.2 MG/DL (ref 8.7–10.6)
CHLORIDE SERPL-SCNC: 103 MMOL/L (ref 98–112)
CO2 SERPL-SCNC: 24 MMOL/L (ref 21–32)
COLOR UR AUTO: YELLOW
CREAT BLD-MCNC: 0.9 MG/DL (ref 0.55–1.02)
EGFRCR SERPLBLD CKD-EPI 2021: 77 ML/MIN/1.73M2 (ref 60–?)
EOSINOPHIL # BLD AUTO: 0.08 X10(3) UL (ref 0–0.7)
EOSINOPHIL NFR BLD AUTO: 1 %
ERYTHROCYTE [DISTWIDTH] IN BLOOD BY AUTOMATED COUNT: 14.5 %
GLOBULIN PLAS-MCNC: 3.2 G/DL (ref 2–3.5)
GLUCOSE BLD-MCNC: 118 MG/DL (ref 70–99)
GLUCOSE UR STRIP.AUTO-MCNC: NORMAL MG/DL
HCT VFR BLD AUTO: 46.3 % (ref 35–48)
HGB BLD-MCNC: 15 G/DL (ref 12–16)
HYALINE CASTS #/AREA URNS AUTO: PRESENT /LPF
IMM GRANULOCYTES # BLD AUTO: 0.02 X10(3) UL (ref 0–1)
IMM GRANULOCYTES NFR BLD: 0.2 %
KETONES UR STRIP.AUTO-MCNC: 20 MG/DL
LEUKOCYTE ESTERASE UR QL STRIP.AUTO: NEGATIVE
LIPASE SERPL-CCNC: 28 U/L (ref 12–53)
LYMPHOCYTES # BLD AUTO: 1.72 X10(3) UL (ref 1–4)
LYMPHOCYTES NFR BLD AUTO: 20.8 %
MCH RBC QN AUTO: 26.9 PG (ref 26–34)
MCHC RBC AUTO-ENTMCNC: 32.4 G/DL (ref 31–37)
MCV RBC AUTO: 83.1 FL (ref 80–100)
MONOCYTES # BLD AUTO: 0.63 X10(3) UL (ref 0.1–1)
MONOCYTES NFR BLD AUTO: 7.6 %
NEUTROPHILS # BLD AUTO: 5.77 X10 (3) UL (ref 1.5–7.7)
NEUTROPHILS # BLD AUTO: 5.77 X10(3) UL (ref 1.5–7.7)
NEUTROPHILS NFR BLD AUTO: 69.9 %
NITRITE UR QL STRIP.AUTO: NEGATIVE
OSMOLALITY SERPL CALC.SUM OF ELEC: 287 MOSM/KG (ref 275–295)
PH UR STRIP.AUTO: 6 [PH] (ref 5–8)
PLATELET # BLD AUTO: 325 10(3)UL (ref 150–450)
POTASSIUM SERPL-SCNC: 3.5 MMOL/L (ref 3.5–5.1)
PROT SERPL-MCNC: 8.2 G/DL (ref 5.7–8.2)
PROT UR STRIP.AUTO-MCNC: 30 MG/DL
RBC # BLD AUTO: 5.57 X10(6)UL (ref 3.8–5.3)
RBC UR QL AUTO: NEGATIVE
SODIUM SERPL-SCNC: 137 MMOL/L (ref 136–145)
SP GR UR STRIP.AUTO: 1.03 (ref 1–1.03)
UROBILINOGEN UR STRIP.AUTO-MCNC: NORMAL MG/DL
WBC # BLD AUTO: 8.3 X10(3) UL (ref 4–11)

## 2025-06-15 PROCEDURE — 80053 COMPREHEN METABOLIC PANEL: CPT | Performed by: EMERGENCY MEDICINE

## 2025-06-15 PROCEDURE — 99284 EMERGENCY DEPT VISIT MOD MDM: CPT

## 2025-06-15 PROCEDURE — 85025 COMPLETE CBC W/AUTO DIFF WBC: CPT | Performed by: EMERGENCY MEDICINE

## 2025-06-15 PROCEDURE — 96361 HYDRATE IV INFUSION ADD-ON: CPT

## 2025-06-15 PROCEDURE — 96374 THER/PROPH/DIAG INJ IV PUSH: CPT

## 2025-06-15 PROCEDURE — 99215 OFFICE O/P EST HI 40 MIN: CPT | Performed by: NURSE PRACTITIONER

## 2025-06-15 PROCEDURE — 81001 URINALYSIS AUTO W/SCOPE: CPT | Performed by: EMERGENCY MEDICINE

## 2025-06-15 PROCEDURE — 83690 ASSAY OF LIPASE: CPT | Performed by: EMERGENCY MEDICINE

## 2025-06-15 RX ORDER — ONDANSETRON 4 MG/1
4 TABLET, ORALLY DISINTEGRATING ORAL EVERY 4 HOURS PRN
Qty: 10 TABLET | Refills: 0 | Status: SHIPPED | OUTPATIENT
Start: 2025-06-15 | End: 2025-06-21

## 2025-06-15 RX ORDER — ONDANSETRON 2 MG/ML
4 INJECTION INTRAMUSCULAR; INTRAVENOUS ONCE
Status: DISCONTINUED | OUTPATIENT
Start: 2025-06-15 | End: 2025-06-15

## 2025-06-15 RX ORDER — LOPERAMIDE HYDROCHLORIDE 2 MG/1
2 TABLET ORAL AS NEEDED
Qty: 20 TABLET | Refills: 0 | Status: SHIPPED | OUTPATIENT
Start: 2025-06-15 | End: 2025-06-21

## 2025-06-15 NOTE — ED PROVIDER NOTES
Patient Seen in: [facility]       The following individual(s) verbally consented to be recorded using ambient AI listening technology and understand that they can each withdraw their consent to this listening technology at any point by asking the clinician to turn off or pause the recording:    Patient name: Brigitte Gregory        History  Chief Complaint   Patient presents with    Nausea/Vomiting/Diarrhea     Stated Complaint: vomit; potentially dehydrated    Subjective:   HPI     Brigitte Gregory is a 49 year old female with a history of pancreatitis who presents with persistent vomiting and diarrhea after starting Mounjaro.    She began experiencing symptoms after her third dose of Mounjaro, which she started on Thursday night. Initially asymptomatic, by Friday late afternoon, after consuming a meal with extra protein, she developed severe abdominal pain, vomiting, and diarrhea, which persisted throughout the night.    Since Friday night, she has been unable to retain any oral intake, including water, which she vomits shortly after ingestion. The abdominal pain has subsided, likely due to fasting, but vomiting and diarrhea continue. Diarrhea occurs even with minimal fluid intake.    She has a history of pancreatitis related to her gallbladder, but has not experienced an episode since her gallbladder was addressed. She is concerned about the possibility of pancreatitis due to the similarity of her current symptoms to past episodes. She has no history of alcohol use.    She attempted to manage her symptoms with Pepto Bismol, but was unable to retain it. No fever, no blood in stool, although there is minor bleeding from frequent wiping. She is still producing urine.    She visited the ER on Saturday due to strong abdominal pain        Objective:     Past Medical History:    Acanthosis nigricans    BV (bacterial vaginosis)    Carpal tunnel syndrome    Diabetes (HCC)    Endometriosis, site  unspecified    External hemorrhoids without mention of complication    Gastritis    mild    Headache(784.0)    Helicobacter pylori (H. pylori)    H. pylori (+)    High-density lipoprotein deficiency    \"low HDL\"    Hypertension    Obesity    Other ill-defined conditions(799.89)    \"Microcytic indices; Needs labs done for iron studies and/or thalassemia!!\"    Other psoriasis    Pancreatitis (HCC)    Paresthesia    PONV (postoperative nausea and vomiting)    Unspecified essential hypertension    also hx essential HTN, benign    Unspecified pruritic disorder    Vertigo              Past Surgical History:   Procedure Laterality Date    Angiogram  2015    Cholecystectomy  2005    laparoscopic, Dr. Mcpherson    Colonoscopy      Hysterectomy  2019    Laparoscopy procedure unlisted  1995    Laparoscopy    Laparoscopy, surg, w/vaginal hysterectomy, uterus >250gms; w/remove tube(s) &/or ovary(s)  08/13/2019    saranya Luna, for large and growing fibroid that proved benign                Social History     Socioeconomic History    Marital status:     Number of children: 0   Occupational History    Occupation: Professor     Employer: [employer]   Tobacco Use    Smoking status: Never     Passive exposure: Never    Smokeless tobacco: Never   Vaping Use    Vaping status: Never Used   Substance and Sexual Activity    Alcohol use: No    Drug use: No    Sexual activity: Not Currently     Partners: Male   Other Topics Concern    Caffeine Concern No    Occupational Exposure No    Sleep Concern No    Stress Concern Yes     Comment: My shortness of breath issues.    Weight Concern Yes    Special Diet Yes    Exercise Yes    Seat Belt Yes    Self-Exams Yes     Social Drivers of Health     Food Insecurity: No Food Insecurity (2/21/2025)    NCSS - Food Insecurity     Worried About Running Out of Food in the Last Year: No     Ran Out of Food in the Last Year: No   Transportation Needs: No Transportation Needs (2/21/2025)    NCSS  - Transportation     Lack of Transportation: No   Housing Stability: Not At Risk (2/21/2025)    NCSS - Housing/Utilities     Has Housing: Yes     Worried About Losing Housing: No     Unable to Get Utilities: No              Review of Systems    Positive for stated complaint: vomit; potentially dehydrated  Other systems are as noted in HPI.  Constitutional and vital signs reviewed.      All other systems reviewed and negative except as noted above.      Physical Exam    ED Triage Vitals [06/15/25 0934]   /70   Pulse 95   Resp 18   Temp 98.3 °F (36.8 °C)   Temp src    SpO2 97 %   O2 Device None (Room air)       Current Vitals:   Vital Signs  BP: 120/70  Pulse: 95  Resp: 18  Temp: 98.3 °F (36.8 °C)    Oxygen Therapy  SpO2: 97 %  O2 Device: None (Room air)            Physical Exam  Vitals and nursing note reviewed.   Constitutional:       General: She is not in acute distress.     Appearance: Normal appearance. She is not ill-appearing or toxic-appearing.   HENT:      Head: Normocephalic and atraumatic.      Nose: Nose normal.      Mouth/Throat:      Mouth: Mucous membranes are moist.      Pharynx: Oropharynx is clear.   Eyes:      Pupils: Pupils are equal, round, and reactive to light.   Cardiovascular:      Rate and Rhythm: Normal rate and regular rhythm.      Pulses: Normal pulses.   Pulmonary:      Effort: Pulmonary effort is normal. No respiratory distress.      Breath sounds: Normal breath sounds.      Comments: Lungs clear.  No adventitious lung sounds.  No distress.  No hypoxia.  Pulse ox 97% ra. Which is normal    Abdominal:      General: Abdomen is flat.      Palpations: Abdomen is soft.      Tenderness: There is abdominal tenderness (mild) in the left upper quadrant and left lower quadrant.   Musculoskeletal:         General: No signs of injury. Normal range of motion.      Cervical back: Normal range of motion and neck supple.   Skin:     General: Skin is warm and dry.      Capillary Refill: Capillary  refill takes less than 2 seconds.   Neurological:      General: No focal deficit present.      Mental Status: She is alert and oriented to person, place, and time.      GCS: GCS eye subscore is 4. GCS verbal subscore is 5. GCS motor subscore is 6.   Psychiatric:         Mood and Affect: Mood normal.         Behavior: Behavior normal.         Thought Content: Thought content normal.         Judgment: Judgment normal.           ED Course  Labs Reviewed - No data to display       MDM           Medical Decision Making  Differential diagnoses reflecting the complexity of care include: Vomiting and diarrhea, gastroenteritis, UTI, pancreatitis, dehydration  Persistent vomiting and diarrhea for 3 days.  Onset after taking her third dose of Mounjaro.  She does have a history of pancreatitis.  Complaining of some abdominal pain, was previously more severe, now improved.  She does have minimal tenderness to the left side of her abdomen.  No specific right upper quadrant tenderness.  Shared decision making with patient.  Discussed limitations of the immediate care.  No lipase testing available at this site.  She prefers to have lipase testing done today and states she would like to go to the emergency room for evaluation  The case was discussed with attending Dr Mauro. They are in agreement with the plan of care.         Problems Addressed:  Nausea and vomiting, unspecified vomiting type: acute illness or injury        Disposition and Plan     Clinical Impression:  1. Nausea and vomiting, unspecified vomiting type         Disposition:  Ic to ed  6/15/2025  9:41 am    Follow-up:  No follow-up provider specified.        Medications Prescribed:  Current Discharge Medication List                Supplementary Documentation:

## 2025-06-15 NOTE — ED QUICK NOTES
Pt tolerated sips of water, no vomiting of diarrhea while here in ER. Pt feels ready for discharge.

## 2025-06-15 NOTE — ED PROVIDER NOTES
Patient Seen in: [facility] Emergency Department        History  Chief Complaint   Patient presents with    Abdomen/Flank Pain     Stated Complaint: Abd pain w vomiting    Subjective:   HPI            Patient is a 49-year-old female presenting with upper abdominal pain, nausea intractable vomiting as well as some diarrhea for the last 3 days.  She has prior history of pancreatitis due to gallbladder disease/gallstones but has not had any episodes since that was removed 20 years ago.  She was started on Mounjaro 2.5 mg injections about 3 weeks ago.  Tolerated the first 2 weeks very well with minimal nausea but with the most recent one that was 3 days ago she is developed persistent nausea, intractable vomiting and upper abdominal pain.  Has not been able to keep anything down.  Reminds her of how her prior pancreatitis felt.      Objective:     Past Medical History:    Acanthosis nigricans    BV (bacterial vaginosis)    Carpal tunnel syndrome    Diabetes (HCC)    Endometriosis, site unspecified    External hemorrhoids without mention of complication    Gastritis    mild    Headache(784.0)    Helicobacter pylori (H. pylori)    H. pylori (+)    High-density lipoprotein deficiency    \"low HDL\"    Hypertension    Obesity    Other ill-defined conditions(799.89)    \"Microcytic indices; Needs labs done for iron studies and/or thalassemia!!\"    Other psoriasis    Pancreatitis (HCC)    Paresthesia    PONV (postoperative nausea and vomiting)    Unspecified essential hypertension    also hx essential HTN, benign    Unspecified pruritic disorder    Vertigo              No pertinent past surgical history.              No pertinent social history.                              Physical Exam    ED Triage Vitals [06/15/25 1045]   /73   Pulse 87   Resp 17   Temp    Temp src    SpO2 100 %   O2 Device None (Room air)       Current Vitals:   Vital Signs  BP: 132/76  Pulse: 79  Resp: 16  MAP (mmHg): 93    Oxygen  Therapy  SpO2: 100 %  O2 Device: None (Room air)            Physical Exam  Vitals and nursing note reviewed.   Constitutional:       Appearance: She is well-developed.   HENT:      Head: Normocephalic and atraumatic.   Eyes:      Conjunctiva/sclera: Conjunctivae normal.      Pupils: Pupils are equal, round, and reactive to light.   Cardiovascular:      Rate and Rhythm: Normal rate and regular rhythm.      Heart sounds: Normal heart sounds.   Pulmonary:      Effort: Pulmonary effort is normal.      Breath sounds: Normal breath sounds.   Abdominal:      General: Bowel sounds are normal.      Palpations: Abdomen is soft.      Comments: Mild epigastric tenderness.  Nondistended.  No rebound or guarding.   Musculoskeletal:         General: Normal range of motion.      Cervical back: Normal range of motion and neck supple.   Skin:     General: Skin is warm and dry.   Neurological:      Mental Status: She is alert and oriented to person, place, and time.                 ED Course  Labs Reviewed   COMP METABOLIC PANEL (14) - Abnormal; Notable for the following components:       Result Value    Glucose 118 (*)     Albumin 5.0 (*)     All other components within normal limits   CBC WITH DIFFERENTIAL WITH PLATELET - Abnormal; Notable for the following components:    RBC 5.57 (*)     All other components within normal limits   URINALYSIS WITH CULTURE REFLEX - Abnormal; Notable for the following components:    Clarity Urine Turbid (*)     Ketones Urine 20 (*)     Protein Urine 30 (*)     Squamous Epi. Cells Few (*)     Hyaline Casts Present (*)     All other components within normal limits   LIPASE - Normal                            MDM     49-year-old female presenting for evaluation of epigastric pain, nausea, vomiting.  Started after her third dose of Mounjaro 3 days ago and has been constant since.  Reminds her of prior episode of pancreatitis which was due to gallstones although she is now status post cholecystectomy.   Differential also includes gastritis, peptic ulcer disease, gastroenteritis.  Labs ordered.  May get imaging depending on results.    Update at 12:50 PM.  Workup thus far is unremarkable.  No leukocytosis.  No significant electrolyte abnormality and no renal insufficiency.  Lipase is normal.  She is feeling better after IV fluids and is tolerating p.o. challenge.  She is comfortable going home.      Past Medical History-diabetes, high cholesterol, hypertension    Differential diagnosis before testing included medication side effect, dehydration, electrolyte abnormality    Co-morbidities that add to the complexity of management include: None    Testing ordered during this visit included labs        Medications Provided: Zofran, IV fluids            Disposition:        Discharge  I have discussed with the patient the results of test, differential diagnosis, treatment plan, warning signs and symptoms which should prompt immediate return.  They expressed understanding of these instructions and agrees to the following plan provided.  They were given written discharge instructions and agrees to return for any concerns and voiced understanding and all questions were answered.      Medical Decision Making      Disposition and Plan     Clinical Impression:  1. Nausea and vomiting in adult         Disposition:  Discharge  6/15/2025 12:55 pm    Follow-up:  Nicolette Hernandez MD  [address] [place] [id number]  [phone]    Follow up            Medications Prescribed:  Current Discharge Medication List        START taking these medications    Details   ondansetron 4 MG Oral Tablet Dispersible Take 1 tablet (4 mg total) by mouth every 4 (four) hours as needed for Nausea.  Qty: 10 tablet, Refills: 0      Loperamide HCl 2 MG Oral Tab Take 1 tablet (2 mg total) by mouth as needed for Diarrhea.  Qty: 20 tablet, Refills: 0                   Supplementary Documentation:

## 2025-06-15 NOTE — DISCHARGE INSTRUCTIONS
Ondansetron for nausea, loperamide for diarrhea.  Push fluids, bland diet today and advance as tolerated.  Follow-up with your endocrinologist to discuss further dosing of the Mounjaro.

## 2025-06-16 ENCOUNTER — TELEPHONE (OUTPATIENT)
Facility: CLINIC | Age: 50
End: 2025-06-16

## 2025-06-16 ENCOUNTER — DOCUMENTATION ONLY (OUTPATIENT)
Facility: CLINIC | Age: 50
End: 2025-06-16

## 2025-06-16 DIAGNOSIS — E66.812 CLASS 2 SEVERE OBESITY DUE TO EXCESS CALORIES WITH SERIOUS COMORBIDITY AND BODY MASS INDEX (BMI) OF 39.0 TO 39.9 IN ADULT (HCC): ICD-10-CM

## 2025-06-16 DIAGNOSIS — E66.01 CLASS 2 SEVERE OBESITY DUE TO EXCESS CALORIES WITH SERIOUS COMORBIDITY AND BODY MASS INDEX (BMI) OF 39.0 TO 39.9 IN ADULT (HCC): ICD-10-CM

## 2025-06-16 DIAGNOSIS — E11.65 TYPE 2 DIABETES MELLITUS WITH HYPERGLYCEMIA, WITHOUT LONG-TERM CURRENT USE OF INSULIN (HCC): ICD-10-CM

## 2025-06-16 NOTE — PROGRESS NOTES
COLT Ventura    Patient paged after hours.   Experiencing severe nausea, vomiting , diarrhea since Friday, took Mounjaro on Thursday. Started Mounjaro 2 weeks ago.   She even had an episode of vomiting while speaking with me.   Unable to keep anything down, attempted ginger ale and oral fluids.   Patient reports having hx of  pancreatitis 4-5 times in the past and states it was due gall bladder issues in the past  Advise ER visit for further evaluation and management, patient is agreeable.

## 2025-06-16 NOTE — TELEPHONE ENCOUNTER
Patient phoned office. States she had a great experience with first two weeks of Mounjaro- a little bit of nausea, burping, but manageable. Then, she took her third shot Friday afternoon, and had dinner (pad Brazilian with chicken from [employer]) 15 minutes after eating she began experiencing nausea, vomiting, stomach pain, diarrhea. Lasted all night. Lasted until she called Dr. Hankins on 6/14- was told to go to ER.     Patient went to ER, but the wait was very long, and she ended up leaving before she was seen.    The next morning- Sunday- she tried to drink water- threw it up again. Tried to go to Urgent Care, they saw her, but told her she needed to go to ER.    She went to ER and they did rule out pancreatitis, they believe this was from Mounjaro. Patient questioning what Nila thinks? Mounjaro? Could this have been food poisoning?     She has now tolerated electrolye water, broth soup, applesauce. Today- half a peach, half a piece of bread, almond milk, chickory powder and water. States she is taking things slow, and finally feeling better now.     Her next dose of Mounjaro is supposed to be Thursday evening.    She leaves for birthday planned vacation to [facility] on Sunday 6/22 at 5am.     Patient wants to know if she should continue Mounjaro? Hold? Try something else? Expresses disappointment as she feels like she was seeing positive outcomes with Mounjaro, but these symptoms were intolerable.

## 2025-06-17 RX ORDER — TIRZEPATIDE 2.5 MG/.5ML
2.5 INJECTION, SOLUTION SUBCUTANEOUS WEEKLY
Qty: 2 ML | Refills: 0 | Status: SHIPPED | OUTPATIENT
Start: 2025-06-17

## 2025-06-17 NOTE — TELEPHONE ENCOUNTER
Spoke with patient and reviewed below. She verbalized understanding, asking for refill of the Mounjaro to pharmacy. Sent. Reviewed looks like Zofran was sent by ER doctor- patient had not picked up- reviewed can pick that prescription up as well.     Closing this encounter.

## 2025-06-17 NOTE — TELEPHONE ENCOUNTER
So sorry to hear she felt so poorly- it sounds like it may have been triggered from the meal at [employer] (higher fat can cause the nausea on meds like mounjaro, so likely correlated between the med + food intake) - let's hold off x1 week and resume after she returns from her trip.     I can send small script of zofran proactively which can help just for the couple of weeks as her body adjusts to the new medication. If n/v persists would want to know and at that point can discontinue. We can keep her at this low dose of mounjaro as well (can discuss plan at our coming appointment)

## 2025-06-18 ENCOUNTER — PATIENT OUTREACH (OUTPATIENT)
Dept: CASE MANAGEMENT | Age: 50
End: 2025-06-18

## 2025-06-18 NOTE — PROGRESS NOTES
Patient had recent Emergency Room visit, calling to offer Primary Care Physician follow-up appointment (discharged 06/15)    Dr Nicolette Hernandez  [facility]  [address] [place] [address]  [phone]    Attempt #1:  Left message on voicemail for patient to call transitions specialist back to schedule follow up appointments. Provided Transitions specialist scheduling phone number [phone].

## 2025-06-18 NOTE — PROGRESS NOTES
CALLED PT IN ERROR ; 1ST ATTEMPT ALREADY PLACED :      Patient had recent Emergency Room visit, calling to offer Primary Care Physician follow-up appointment (discharged 06/15 [facility])     Dr Nicolette Hernandez  Family Medicine  [address] [place] [id number] [phone]

## 2025-06-19 ENCOUNTER — NURSE TRIAGE (OUTPATIENT)
Dept: FAMILY MEDICINE CLINIC | Facility: CLINIC | Age: 50
End: 2025-06-19

## 2025-06-19 NOTE — PROGRESS NOTES
ED Hospital Follow up for pcp (Discharge 6/15 [facility] )     PCP  Nicolette Hernandez  Family Medicine  [address] [address]  [phone]  Unable to contact pt after multiple attempts   Attempt #2:  Left message on voicemail for patient to call transitions specialist back to schedule follow up appointments. Provided Transitions specialist scheduling phone number [phone]. Closing encounter. Will re-open if patient returns call.

## 2025-06-19 NOTE — TELEPHONE ENCOUNTER
Action Requested: Summary for Provider     []  Critical Lab, Recommendations Needed  [] Need Additional Advice  [x]   FYI    []   Need Orders  [] Need Medications Sent to Pharmacy  []  Other     SUMMARY: Pt scheduled for OV evaluation for bilateral knee pain.    Future Appointments   Date Time Provider Department Center   6/21/2025 10:40 AM Nicolette Hernandez MD [facility]       Reason for call: No chief complaint on file.  Onset: Few weeks      Notes:   - Bilateral knee pain x 2-3 weeks.  - Recent UC visit x-ray showed osteoarthritis.   - Walking slow, gingerly d/t pain, states she feels like she is walking like an old lady.  - Has always had a clicking nose from knees but no pain.   - Started doing tennis few weeks ago, that's when pain really noticeable.  - Throbbing, sharp depending on position, aching when sitting on couch.  - Started Mounjaro rx x3 doses. ED visit recently for abdominal pain, vomiting, diarrhea. Pancreatitis workup negative.   - 5 day all-inclusive vacation to [facility] next week, she is concerned she may not be able to do as much walking/site seeing as she would like d/t pain.  - Discussed home care: NSAIDs, ice, rest.     Reason for Disposition   MODERATE pain (e.g., symptoms interfere with work or school, limping) and present > 3 days    Protocols used: Knee Pain-A-OH

## 2025-06-21 ENCOUNTER — OFFICE VISIT (OUTPATIENT)
Dept: FAMILY MEDICINE CLINIC | Facility: CLINIC | Age: 50
End: 2025-06-21
Payer: COMMERCIAL

## 2025-06-21 VITALS
OXYGEN SATURATION: 98 % | WEIGHT: 207 LBS | DIASTOLIC BLOOD PRESSURE: 78 MMHG | BODY MASS INDEX: 39.08 KG/M2 | RESPIRATION RATE: 18 BRPM | TEMPERATURE: 98 F | SYSTOLIC BLOOD PRESSURE: 110 MMHG | HEART RATE: 71 BPM | HEIGHT: 61 IN

## 2025-06-21 DIAGNOSIS — M17.0 PRIMARY OSTEOARTHRITIS OF BOTH KNEES: Primary | ICD-10-CM

## 2025-06-21 DIAGNOSIS — L40.9 PSORIASIS: ICD-10-CM

## 2025-06-21 DIAGNOSIS — E66.812 CLASS 2 SEVERE OBESITY DUE TO EXCESS CALORIES WITH SERIOUS COMORBIDITY AND BODY MASS INDEX (BMI) OF 39.0 TO 39.9 IN ADULT (HCC): ICD-10-CM

## 2025-06-21 DIAGNOSIS — E66.01 CLASS 2 SEVERE OBESITY DUE TO EXCESS CALORIES WITH SERIOUS COMORBIDITY AND BODY MASS INDEX (BMI) OF 39.0 TO 39.9 IN ADULT (HCC): ICD-10-CM

## 2025-06-21 DIAGNOSIS — E11.9 CONTROLLED TYPE 2 DIABETES MELLITUS WITHOUT COMPLICATION, WITHOUT LONG-TERM CURRENT USE OF INSULIN (HCC): ICD-10-CM

## 2025-06-21 PROCEDURE — 3074F SYST BP LT 130 MM HG: CPT | Performed by: FAMILY MEDICINE

## 2025-06-21 PROCEDURE — 3008F BODY MASS INDEX DOCD: CPT | Performed by: FAMILY MEDICINE

## 2025-06-21 PROCEDURE — 3078F DIAST BP <80 MM HG: CPT | Performed by: FAMILY MEDICINE

## 2025-06-21 PROCEDURE — 99213 OFFICE O/P EST LOW 20 MIN: CPT | Performed by: FAMILY MEDICINE

## 2025-06-21 NOTE — PROGRESS NOTES
Family Medicine Progress Note  ASSESSMENT AND PLAN:  Brigitte Gregory is a 50 year old female who is here for:     Brigitte was seen today for knee pain and other.    Diagnoses and all orders for this visit:    Primary osteoarthritis of both knees  Acute bilateral knee pain with swelling and heaviness, likely exacerbated by recent activity. X-rays suggest osteoarthritis; psoriatic arthritis considered due to psoriasis.  - Prescribed naproxen twice daily with food for pain management.  - Recommend knee braces for support during ambulation.  - Instruct to ice knees for 15 minutes, three times daily.  - Advise rest and avoidance of strenuous activities like tennis.  - Refer to orthopedics for further evaluation and management.    Controlled type 2 diabetes mellitus without complication, without long-term current use of insulin ([facility])  Engaged in weight management and lifestyle modifications with significant weight loss and improved blood sugar levels since starting Mounjaro.    Class 2 severe obesity due to excess calories with serious comorbidity and body mass index (BMI) of 39.0 to 39.9 in adult ([facility])  Started Mounjaro for weight management with significant weight loss. Side effects include nausea and possible exacerbation of joint pain and psoriasis. Severe reaction after high-fat meal led to ER visit. Benefits include weight loss and improved blood sugar levels.  - Pause Mounjaro while traveling and resume after returning to monitor for side effects.  - Monitor for recurrent side effects upon resuming Mounjaro.    Psoriasis  Psoriasis worsened since starting Mounjaro. Potential link between Mounjaro and autoimmune conditions discussed.       The patient indicates understanding of these issues and agrees to the plan.  Follow-Up: The patient is asked to  Return if symptoms worsen or fail to improve.  .     Nicolette Hernandez MD        CC: Knee Pain (Both knees)    The following individual(s) verbally consented to be  recorded using ambient AI listening technology and understand that they can each withdraw their consent to this listening technology at any point by asking the clinician to turn off or pause the recording:    Patient name: Brigitte Gregory    HPI:     History of Present Illness  Brigitte Gregory is a 50 year old female with psoriasis who presents with bilateral knee pain and swelling.    She began experiencing knee pain after her first tennis practice, with sudden, severe pain in the right knee while climbing stairs, described as a 'snap' followed by an inability to move it properly. Shortly after, her left knee began to hurt as well. The pain initially subsided but returned with increased intensity during her second tennis practice a week later, accompanied by swelling and a sensation of heaviness, as if 'five pound rocks' were in her knees. She has been unable to climb stairs and feels that her knees might 'fall out of their sockets'. Went to urgent care and x-ray showed arthritis in her knees.    She manages the pain with Advil, taking three tablets once a day, and elevating her knees. She visited urgent care where x-rays were taken, revealing osteoarthritis in both knees.    Started Munjaro for diabetes and weight management, did well with the first 2 doses and lost 8 lbs.    Her psoriasis worsened after starting Mounjaro, despite losing weight and reducing inflammation elsewhere in her body. She experienced a severe reaction to her third Mounjaro shot,had abdominal pain, nausea and vomiting after she had pad Hungarian leading to an ER visit, work up was negative for pancreatitis. She has since paused the medication until she comes back from her travel to [facility] next week..    ALLERGY:     Allergies as of 06/21/2025 - Review Complete 06/21/2025   Allergen Reaction Noted    Codeine RASH 03/18/2013    Seafood ANAPHYLAXIS and SWELLING 03/18/2013    Shellfish-derived products ANAPHYLAXIS and RASH 03/31/2016    Olmesartan  medoxomil-hctz PAIN 05/03/2019    Enbrel RASH 04/17/2013    Irbesartan OTHER (SEE COMMENTS) 03/23/2021       MEDICATIONS:     Current Medications[1]   Past Medical History[2]   Social History:  Short Social Hx on File[3]     REVIEW OF SYSTEMS:   A comprehensive 10 point review of systems was completed.  Pertinent positives and negatives noted in the the HPI.      EXAM:   /78   Pulse 71   Temp 98 °F (36.7 °C) (Temporal)   Resp 18   Ht 5' 1\" (1.549 m)   Wt 207 lb (93.9 kg)   LMP 07/21/2019 (Exact Date)   SpO2 98%   BMI 39.11 kg/m²   GENERAL: obese,in no apparent distress  SKIN: + plaque psoriasis on extremities  LUNGS: clear to auscultation  CARDIO: RRR without murmur  KNEE: bilateral, minimal swelling, tenderness to palpation along the joint line, + crepitus, ROM is full but painful with flexion.      NOTE TO PATIENT: The 21st Century Cures Act makes clinical notes like these available to patients in the interest of transparency. Clinical notes are medical documents used by physicians and care providers to communicate with each other. These documents include medical language and terminology, abbreviations, and treatment information that may sound technical and at times possibly unfamiliar. In addition, at times, the verbiage may appear blunt or direct. These documents are one tool providers use to communicate relevant information and clinical opinions of the care providers in a way that allows common understanding of the clinical context.      Nicolette Hernandez MD               [1]   Current Outpatient Medications   Medication Sig Dispense Refill    Tirzepatide (MOUNJARO) 2.5 MG/0.5ML Subcutaneous Solution Auto-injector Inject 2.5 mg into the skin once a week. 2 mL 0    lisinopril 10 MG Oral Tab Take 1 tablet (10 mg total) by mouth in the morning and 1 tablet (10 mg total) before bedtime. 180 tablet 1    lisinopril-hydroCHLOROthiazide 20-25 MG Oral Tab Take 1 tablet by mouth every morning. 90 tablet 1    [2]   Past Medical History:   Acanthosis nigricans    BV (bacterial vaginosis)    Carpal tunnel syndrome    Diabetes (HCC)    Endometriosis, site unspecified    External hemorrhoids without mention of complication    Gastritis    mild    Headache(784.0)    Helicobacter pylori (H. pylori)    H. pylori (+)    High-density lipoprotein deficiency    \"low HDL\"    Hypertension    Obesity    Other ill-defined conditions(799.89)    \"Microcytic indices; Needs labs done for iron studies and/or thalassemia!!\"    Other psoriasis    Pancreatitis (HCC)    Paresthesia    PONV (postoperative nausea and vomiting)    Unspecified essential hypertension    also hx essential HTN, benign    Unspecified pruritic disorder    Vertigo   [3]   Social History  Socioeconomic History    Marital status:     Number of children: 0   Occupational History    Occupation: Professor     Employer: [employer]   Tobacco Use    Smoking status: Never     Passive exposure: Never    Smokeless tobacco: Never   Vaping Use    Vaping status: Never Used   Substance and Sexual Activity    Alcohol use: No    Drug use: No    Sexual activity: Not Currently     Partners: Male   Other Topics Concern    Caffeine Concern No    Occupational Exposure No    Sleep Concern No    Stress Concern Yes     Comment: My shortness of breath issues.    Weight Concern Yes    Special Diet Yes    Exercise Yes    Seat Belt Yes    Self-Exams Yes     Social Drivers of Health     Food Insecurity: No Food Insecurity (2/21/2025)    NCSS - Food Insecurity     Worried About Running Out of Food in the Last Year: No     Ran Out of Food in the Last Year: No   Transportation Needs: No Transportation Needs (2/21/2025)    NCSS - Transportation     Lack of Transportation: No   Housing Stability: Not At Risk (2/21/2025)    NCSS - Housing/Utilities     Has Housing: Yes     Worried About Losing Housing: No     Unable to Get Utilities: No

## 2025-07-02 ENCOUNTER — PATIENT MESSAGE (OUTPATIENT)
Dept: FAMILY MEDICINE CLINIC | Facility: CLINIC | Age: 50
End: 2025-07-02

## 2025-07-02 ENCOUNTER — PATIENT MESSAGE (OUTPATIENT)
Facility: CLINIC | Age: 50
End: 2025-07-02

## 2025-07-02 DIAGNOSIS — E66.01 CLASS 2 SEVERE OBESITY DUE TO EXCESS CALORIES WITH SERIOUS COMORBIDITY AND BODY MASS INDEX (BMI) OF 39.0 TO 39.9 IN ADULT (HCC): ICD-10-CM

## 2025-07-02 DIAGNOSIS — M17.0 PRIMARY OSTEOARTHRITIS OF BOTH KNEES: Primary | ICD-10-CM

## 2025-07-02 DIAGNOSIS — E66.812 CLASS 2 SEVERE OBESITY DUE TO EXCESS CALORIES WITH SERIOUS COMORBIDITY AND BODY MASS INDEX (BMI) OF 39.0 TO 39.9 IN ADULT (HCC): ICD-10-CM

## 2025-07-02 DIAGNOSIS — E11.65 TYPE 2 DIABETES MELLITUS WITH HYPERGLYCEMIA, WITHOUT LONG-TERM CURRENT USE OF INSULIN (HCC): Primary | ICD-10-CM

## 2025-07-02 RX ORDER — SEMAGLUTIDE 0.68 MG/ML
0.25 INJECTION, SOLUTION SUBCUTANEOUS WEEKLY
Qty: 3 ML | Refills: 0 | Status: CANCELLED
Start: 2025-07-02

## 2025-07-02 NOTE — TELEPHONE ENCOUNTER
Dr. Hernandez,       Order pended for [facility], Dr. Bermudez if appropriate.      LOV 6/21/25  History of Present Illness  Brigitte Gregory is a 50 year old female with psoriasis who presents with bilateral knee pain and swelling.     She began experiencing knee pain after her first tennis practice, with sudden, severe pain in the right knee while climbing stairs, described as a 'snap' followed by an inability to move it properly. Shortly after, her left knee began to hurt as well. The pain initially subsided but returned with increased intensity during her second tennis practice a week later, accompanied by swelling and a sensation of heaviness, as if 'five pound rocks' were in her knees. She has been unable to climb stairs and feels that her knees might 'fall out of their sockets'. Went to urgent care and x-ray showed arthritis in her knees.    Discussed the above information and patient's message with Dr. Hernandez.  Referral order entered for Orthopedic, Dr. Bernardo.

## 2025-07-03 RX ORDER — ONDANSETRON 4 MG/1
4 TABLET, ORALLY DISINTEGRATING ORAL EVERY 8 HOURS PRN
Qty: 8 TABLET | Refills: 0 | Status: SHIPPED | OUTPATIENT
Start: 2025-07-03

## 2025-07-03 RX ORDER — TIRZEPATIDE 2.5 MG/.5ML
2.5 INJECTION, SOLUTION SUBCUTANEOUS WEEKLY
Qty: 2 ML | Refills: 0 | Status: SHIPPED | OUTPATIENT
Start: 2025-07-03

## 2025-07-03 NOTE — TELEPHONE ENCOUNTER
Patient phoned in states she spoke with insurance - Prior Authorization needs to be marked as Urgent to be done today.     Prior Authorization sent in Cover My Meds, awaiting determination.

## 2025-07-03 NOTE — TELEPHONE ENCOUNTER
Patient states they had to transfer her prescription to [facility] in [place] on [address], now saying it needs a prior authorization.    Logged into Cover My Meds and completed prior authorization. Key: [id number]    Awaiting determination.    Of note- patient's plan will only cover 4 pens in 180 days of Mounjaro 2.5mg dose. Believe this is patient's #2 pen that she is trying to fill.

## 2025-07-07 ENCOUNTER — OFFICE VISIT (OUTPATIENT)
Dept: ORTHOPEDICS CLINIC | Facility: CLINIC | Age: 50
End: 2025-07-07
Payer: COMMERCIAL

## 2025-07-07 VITALS — WEIGHT: 209 LBS | HEIGHT: 61 IN | BODY MASS INDEX: 39.46 KG/M2

## 2025-07-07 DIAGNOSIS — M17.12 PRIMARY OSTEOARTHRITIS OF LEFT KNEE: ICD-10-CM

## 2025-07-07 DIAGNOSIS — M17.11 PRIMARY OSTEOARTHRITIS OF RIGHT KNEE: Primary | ICD-10-CM

## 2025-07-07 PROCEDURE — 3008F BODY MASS INDEX DOCD: CPT | Performed by: STUDENT IN AN ORGANIZED HEALTH CARE EDUCATION/TRAINING PROGRAM

## 2025-07-07 PROCEDURE — 99213 OFFICE O/P EST LOW 20 MIN: CPT | Performed by: STUDENT IN AN ORGANIZED HEALTH CARE EDUCATION/TRAINING PROGRAM

## 2025-07-07 NOTE — PROGRESS NOTES
Orthopaedic Surgery  [address] [address] [address]   [phone]      Chief Complaint:  Bilateral Knee Pain    History of Present Illness  Brigitte Gregory is a 50 year old female who presents with bilateral knee pain.    She began experiencing bilateral knee pain in the first week of June, initially rated as severe at 9 out of 10, which has since decreased to 2-3 out of 10 after discontinuing Mounjaro. The pain was initially so severe that she could barely walk and had difficulty going upstairs, prompting a visit to urgent care where x-rays revealed osteoarthritis in both knees.    The knee pain started after she resumed playing tennis, a sport she had not engaged in for 15 years. The pain is primarily located in the anterior aspect of the knees, with occasional clicking sounds, particularly in the left right, and episodes of a sensation of something shifting inside the knee. She experiences pain in both knees, sometimes while standing, and reports that the pain can be sudden and cause her to pause before moving again.    She has tried Advil at a dose of 600 mg every 8 hours for three to four days, along with icing and elevating her feet, which provided some relief. She is concerned about the impact of Mounjaro on her joint pain, as she noticed improvement in her symptoms after discontinuing the medication.    She has a history of psoriasis, diagnosed approximately five years ago, and reports that her skin condition has worsened since starting Mounjaro, with new areas of involvement on her leg and arm. She has not been worked up for psoriatic arthritis and does not currently see a rheumatologist.    She recalls a fall in 2020 during a period of brain fog from COVID-19, where she missed steps and fell on her knees, which may have contributed to her knee issues. She has experienced clicking in her right knee since 2020, but no prior pain until June 2025.    She wants to continue playing tennis  as it is her preferred form of exercise.      PMH/PSH/Family History/Social History/Meds/Allergies:   Past Medical History[1]     Past Surgical History[2]     Family History[3]     Social History     Socioeconomic History    Marital status:      Spouse name: Not on file    Number of children: 0    Years of education: Not on file    Highest education level: Not on file   Occupational History    Occupation: Professor     Employer: [employer]   Tobacco Use    Smoking status: Never     Passive exposure: Never    Smokeless tobacco: Never   Vaping Use    Vaping status: Never Used   Substance and Sexual Activity    Alcohol use: No    Drug use: No    Sexual activity: Not Currently     Partners: Male   Other Topics Concern     Service Not Asked    Blood Transfusions Not Asked    Caffeine Concern No    Occupational Exposure No    Hobby Hazards Not Asked    Sleep Concern No    Stress Concern Yes     Comment: My shortness of breath issues.    Weight Concern Yes    Special Diet Yes    Back Care Not Asked    Exercise Yes    Bike Helmet Not Asked    Seat Belt Yes    Self-Exams Yes   Social History Narrative    Not on file     Social Drivers of Health     Food Insecurity: No Food Insecurity (2/21/2025)    NCSS - Food Insecurity     Worried About Running Out of Food in the Last Year: No     Ran Out of Food in the Last Year: No   Transportation Needs: No Transportation Needs (2/21/2025)    NCSS - Transportation     Lack of Transportation: No   Stress: Not on file   Housing Stability: Not At Risk (2/21/2025)    NCSS - Housing/Utilities     Has Housing: Yes     Worried About Losing Housing: No     Unable to Get Utilities: No        Current Outpatient Medications   Medication Instructions    lisinopril (ZESTRIL) 10 mg, Oral, 2 times daily    lisinopril-hydroCHLOROthiazide 20-25 MG Oral Tab 1 tablet, Oral, Every morning    Mounjaro 2.5 mg, Subcutaneous, Weekly    ondansetron (ZOFRAN-ODT) 4 mg, Oral, Every 8 hours  PRN        Allergies[4]       Physical Exam:   There were no vitals filed for this visit.  Estimated body mass index is 39.11 kg/m² as calculated from the following:    Height as of 6/21/25: 5' 1\" (1.549 m).    Weight as of 6/21/25: 207 lb (93.9 kg).    Constitutional: No acute distress, well nourished  Eyes: Anicteric sclera, pink conjunctiva  Ears, Nose, Mouth and Throat: Normocephalic, atraumatic, moist mucous membranes  Cardiovascular: No pitting edema or varicosities in the lower extremities  Respiratory: No respiratory distress, normal respiratory rhythm and effort   Neurological:  Oriented to person, place, and time  Psychological:  Appropriate mood and affect    Bilateral Knee Exam:      Inspection: No erythema, ecchymoses, or wounds. Psoriatic rash involving the upper and lower extremities. No previous incisions noted. No effusion. No quad atrophy  Alignment: neutral  ROM: 0 - 130 degrees, flexion contracture: 0 degrees, quad lag: no  Stability: A/P stress: stable, firm endpoint, M/L stress: stable, firm endpoint  Pain or crepitus with ROM?: No. No pain with patellar grind  Tenderness to palpation at: medial joint line; Non-tender at: lateral joint line, patella  Strength: Intact 5/5 strength SLR and TA/GS/FHL/EHL  Sensation: Grossly intact to light touch over SPN/DPN/Saph/Sural/Tibial nerve distributions  Vasc: Warm perfused extremity        Imaging:   XRs of the bilateral knee were reviewed with AP and lateral views    They show mild degenerative changes of the knee involving the medial compartment(s) with joint space narrowing and osteophyte formation. No fracture or dislocation seen    I personally reviewed and interpreted the radiographs      Assessment:     ICD-10-CM    1. Primary osteoarthritis of right knee  M17.11       2. Primary osteoarthritis of left knee  M17.12              Plan:   At today's visit I discussed with the patient their diagnosis and the factors considered to form this diagnosis.  Their history and physical exam and radiographic findings are consistent with arthritis of the knee. As we discussed their diagnosis, information was provided regarding the underlying pathology and the natural course of this progressive condition.    During our discussion, we detailed treatment options available. We counseled the patient on treatment options. Major joint arthritis is usually a chronic condition that can be effectively managed with conservative modalities.     Conservative measures include activity modification, home exercise programs, physical therapy, oral anti-inflammatories and acetaminophen, braces, assistive devices, and intraarticular injections.    In consideration of prior treatments, I have recommended continued conservative treatment with combination of rest, ice, or elevation and physical therapy, as well as activity modification. Discussed for longevity and to limit progression of OA it may be recommended to transition to sports that involve less impact.    She also does complain of some clicking of the knee, right more than left, as well as some mechanical symptoms.  In the setting of her arthritis she may have a concurrent degenerative meniscus tear.  We did discuss potentially obtaining an MRI but she would like to hold off for now given there has been some improvement since onset.      Thank you very much for allowing me to participate in the care of this patient. If you have any questions, please do not hesitate to contact me.      Kamron Bernardo MD  Adult Hip and Knee Reconstruction    Department of Orthopaedic Surgery  [facility]     [address] [place] [address]  [address] [place] [address]     t: [phone]  f: [phone]       [facility]  The following individual(s) verbally consented to be recorded using ambient AI listening technology and understand that they can each withdraw their consent to this listening technology at any point  by asking the clinician to turn off or pause the recording:    Patient name: Brigitte Sam tool was used for dictation purposes only and the patient was not recorded at any point during the visit.         [1]   Past Medical History:   Acanthosis nigricans    BV (bacterial vaginosis)    Carpal tunnel syndrome    Diabetes (HCC)    Endometriosis, site unspecified    External hemorrhoids without mention of complication    Gastritis    mild    Headache(784.0)    Helicobacter pylori (H. pylori)    H. pylori (+)    High-density lipoprotein deficiency    \"low HDL\"    Hypertension    Obesity    Other ill-defined conditions(799.89)    \"Microcytic indices; Needs labs done for iron studies and/or thalassemia!!\"    Other psoriasis    Pancreatitis (HCC)    Paresthesia    PONV (postoperative nausea and vomiting)    Unspecified essential hypertension    also hx essential HTN, benign    Unspecified pruritic disorder    Vertigo   [2]   Past Surgical History:  Procedure Laterality Date    Angiogram  2015    Cholecystectomy  2005    laparoscopic, Dr. Mcpherson    Colonoscopy      Hysterectomy  2019    Laparoscopy procedure unlisted  1995    Laparoscopy    Laparoscopy, surg, w/vaginal hysterectomy, uterus >250gms; w/remove tube(s) &/or ovary(s)  08/13/2019    saranya Luna, for large and growing fibroid that proved benign   [3]   Family History  Problem Relation Age of Onset    Diabetes Mother     Other (digestive disorder) Mother         gallbladder    Other (HTN) Mother     Hypertension Mother     Diabetes Father     Other (digestive disorder) Sister         gallbladder    Other (digestive disorder) Other         gallbladder, grandmother    Diabetes Other         mother's side, aunts/uncles    Other (kidney failure) Other         aunt and uncle, d/t DM   [4]   Allergies  Allergen Reactions    Codeine RASH    Seafood ANAPHYLAXIS and SWELLING     Angioedema      Olmesartan Medoxomil-Hctz PAIN     Headaches daily, rash     Enbrel RASH    Irbesartan OTHER (SEE COMMENTS)     Headache, dizziness

## 2025-07-08 NOTE — PATIENT INSTRUCTIONS
VISIT SUMMARY:    During your visit, we discussed your recent bilateral knee pain and swelling, which began after your first tennis practice. We reviewed your history of psoriasis and the potential link to psoriatic arthritis. We also discussed your use of Mounjaro for weight management and its side effects, including a severe reaction that led to an ER visit. X-rays from urgent care indicated osteoarthritis in both knees.    YOUR PLAN:    -BILATERAL KNEE PAIN: You have acute pain and swelling in both knees, likely worsened by recent physical activity. X-rays suggest osteoarthritis, which is a condition where the protective cartilage that cushions the ends of your bones wears down over time. To manage the pain, you are prescribed naproxen to be taken twice daily with food. You can continue taking ibuprofen 600 mg every 8 hours with food if you prefer. Additionally, using knee braces for support, icing your knees for 15 minutes three times daily, and resting while avoiding strenuous activities like tennis are recommended. You will be referred to orthopedics for further evaluation and management.    -PSORIASIS: Your psoriasis has worsened since starting Mounjaro, a medication you began for weight management. Psoriasis is a skin condition that causes red, itchy, and scaly patches. We discussed the potential link between Mounjaro and autoimmune conditions. We will monitor your condition closely.    -MOUNJARO USE AND SIDE EFFECTS: You started Mounjaro for weight management and have experienced significant weight loss. However, you also had side effects like nausea and a possible worsening of joint pain and psoriasis. A severe reaction after a high-fat meal led to an ER visit. Mounjaro has helped with weight loss and improved blood sugar levels. You should pause Mounjaro while traveling and resume it after returning, monitoring for any recurrent side effects.    -GENERAL HEALTH MAINTENANCE: You have been actively managing  your weight and making lifestyle changes, resulting in significant weight loss and improved blood sugar levels. Continue with these positive changes to maintain your overall health.    INSTRUCTIONS:    Please follow up with orthopedics for further evaluation of your knee pain. Resume Mounjaro after returning from travel and monitor for any side effects.

## 2025-07-18 ENCOUNTER — MED REC SCAN ONLY (OUTPATIENT)
Facility: CLINIC | Age: 50
End: 2025-07-18

## 2025-07-21 PROBLEM — E11.65 TYPE 2 DIABETES MELLITUS WITH HYPERGLYCEMIA, WITHOUT LONG-TERM CURRENT USE OF INSULIN (HCC): Status: ACTIVE | Noted: 2025-07-21

## 2025-08-04 ENCOUNTER — PATIENT MESSAGE (OUTPATIENT)
Dept: FAMILY MEDICINE CLINIC | Facility: CLINIC | Age: 50
End: 2025-08-04

## 2025-08-18 ENCOUNTER — LAB ENCOUNTER (OUTPATIENT)
Dept: LAB | Age: 50
End: 2025-08-18

## 2025-08-18 DIAGNOSIS — E11.65 TYPE 2 DIABETES MELLITUS WITH HYPERGLYCEMIA, WITHOUT LONG-TERM CURRENT USE OF INSULIN (HCC): ICD-10-CM

## 2025-08-18 LAB
EST. AVERAGE GLUCOSE BLD GHB EST-MCNC: 146 MG/DL (ref 68–126)
HBA1C MFR BLD: 6.7 % (ref ?–5.7)
T4 FREE SERPL-MCNC: 1.2 NG/DL (ref 0.8–1.7)
TSI SER-ACNC: 3.36 UIU/ML (ref 0.55–4.78)

## 2025-08-18 PROCEDURE — 80061 LIPID PANEL: CPT

## 2025-08-18 PROCEDURE — 36415 COLL VENOUS BLD VENIPUNCTURE: CPT

## 2025-08-18 PROCEDURE — 83036 HEMOGLOBIN GLYCOSYLATED A1C: CPT

## 2025-08-18 PROCEDURE — 84439 ASSAY OF FREE THYROXINE: CPT

## 2025-08-18 PROCEDURE — 84443 ASSAY THYROID STIM HORMONE: CPT

## 2025-08-19 ENCOUNTER — OFFICE VISIT (OUTPATIENT)
Facility: CLINIC | Age: 50
End: 2025-08-19

## 2025-08-19 VITALS
HEIGHT: 61.2 IN | HEART RATE: 82 BPM | BODY MASS INDEX: 39.18 KG/M2 | DIASTOLIC BLOOD PRESSURE: 78 MMHG | WEIGHT: 207.5 LBS | SYSTOLIC BLOOD PRESSURE: 102 MMHG | OXYGEN SATURATION: 95 %

## 2025-08-19 DIAGNOSIS — E11.65 TYPE 2 DIABETES MELLITUS WITH HYPERGLYCEMIA, WITHOUT LONG-TERM CURRENT USE OF INSULIN (HCC): Primary | ICD-10-CM

## 2025-08-19 DIAGNOSIS — E66.01 CLASS 2 SEVERE OBESITY DUE TO EXCESS CALORIES WITH SERIOUS COMORBIDITY AND BODY MASS INDEX (BMI) OF 39.0 TO 39.9 IN ADULT (HCC): ICD-10-CM

## 2025-08-19 DIAGNOSIS — E66.812 CLASS 2 SEVERE OBESITY DUE TO EXCESS CALORIES WITH SERIOUS COMORBIDITY AND BODY MASS INDEX (BMI) OF 39.0 TO 39.9 IN ADULT (HCC): ICD-10-CM

## 2025-08-19 LAB
CHOLEST SERPL-MCNC: 166 MG/DL (ref ?–200)
HDLC SERPL-MCNC: 35 MG/DL (ref 40–59)
LDLC SERPL CALC-MCNC: 95 MG/DL (ref ?–100)
NONHDLC SERPL-MCNC: 131 MG/DL (ref ?–130)
TRIGL SERPL-MCNC: 207 MG/DL (ref 30–149)
VLDLC SERPL CALC-MCNC: 34 MG/DL (ref 0–30)

## 2025-08-19 PROCEDURE — 3074F SYST BP LT 130 MM HG: CPT

## 2025-08-19 PROCEDURE — 99214 OFFICE O/P EST MOD 30 MIN: CPT

## 2025-08-19 PROCEDURE — 3078F DIAST BP <80 MM HG: CPT

## 2025-08-19 PROCEDURE — 3044F HG A1C LEVEL LT 7.0%: CPT

## 2025-08-19 PROCEDURE — 3008F BODY MASS INDEX DOCD: CPT

## (undated) DEVICE — TIP COVER ACCESSORY

## (undated) DEVICE — DV KIT ACCESSORY 4-ARM

## (undated) DEVICE — PROGRASP FORCEPS: Brand: ENDOWRIST;DAVINCI SI

## (undated) DEVICE — LAPAROSCOPIC TROCAR SLEEVE/SINGLE USE: Brand: KII® SLEEVE

## (undated) DEVICE — SYRINGE 5ML LL TIP

## (undated) DEVICE — NEEDLE SPINAL 20X3-1/2 YELLOW

## (undated) DEVICE — MONOPOLAR CURVED SCISSORS: Brand: ENDOWRIST;DAVINCI SI

## (undated) DEVICE — SPECIMEN TRAP LUKI

## (undated) DEVICE — GAMMEX® NON-LATEX PI TEXTURED SIZE 7.5, STERILE POLYISOPRENE POWDER-FREE SURGICAL GLOVE: Brand: GAMMEX

## (undated) DEVICE — SUTURE VICRYL 0 UR-6

## (undated) DEVICE — [HIGH FLOW INSUFFLATOR,  DO NOT USE IF PACKAGE IS DAMAGED,  KEEP DRY,  KEEP AWAY FROM SUNLIGHT,  PROTECT FROM HEAT AND RADIOACTIVE SOURCES.]: Brand: PNEUMOSURE

## (undated) DEVICE — TROCAR: Brand: KII FIOS FIRST ENTRY

## (undated) DEVICE — CASED DISP BIPOLAR CORD

## (undated) DEVICE — PLUMEPORT ACTIV LAPAROSCOPIC SMOKE FILTRATION DEVICE: Brand: PLUMEPORT ACTIVE

## (undated) DEVICE — GAMMEX® NON-LATEX PI TEXTURED SIZE 6.5, STERILE POLYISOPRENE POWDER-FREE SURGICAL GLOVE: Brand: GAMMEX

## (undated) DEVICE — ANCHOR TISSUE RETRIEVAL SYSTEM, BAG SIZE 175 ML, PORT SIZE 10 MM: Brand: ANCHOR TISSUE RETRIEVAL SYSTEM

## (undated) DEVICE — KENDALL SCD EXPRESS SLEEVES, KNEE LENGTH, MEDIUM: Brand: KENDALL SCD

## (undated) DEVICE — BLADE ELECTRODE: Brand: EDGE

## (undated) DEVICE — UNDYED BRAIDED (POLYGLACTIN 910), SYNTHETIC ABSORBABLE SUTURE: Brand: COATED VICRYL

## (undated) DEVICE — INSUFFLATION NEEDLE TO ESTABLISH PNEUMOPERITONEUM.: Brand: INSUFFLATION NEEDLE

## (undated) DEVICE — MEGA NEEDLE DRIVER: Brand: ENDOWRIST;DAVINCI SI

## (undated) DEVICE — DERMABOND LIQUID ADHESIVE

## (undated) DEVICE — SUTURE VLOC 180 0 12\" 0316

## (undated) DEVICE — LAPSAC SURGICAL TISSUE POUCH: Brand: LAPSAC

## (undated) DEVICE — ELECTRO LUBE IS A SINGLE PATIENT USE DEVICE THAT IS INTENDED TO BE USED ON ELECTROSURGICAL ELECTRODES TO REDUCE STICKING.: Brand: KEY SURGICAL ELECTRO LUBE

## (undated) DEVICE — VISUALIZATION SYSTEM: Brand: CLEARIFY

## (undated) DEVICE — VCARE MEDIUM, UTERINE MANIPULATOR, VAGINAL-CERVICAL-AHLUWALIA'S-RETRACTOR-ELEVATOR: Brand: VCARE

## (undated) DEVICE — DV OBTURATOR BLADELESS 8MM

## (undated) DEVICE — FENESTRATED BIPOLAR FORCEPS: Brand: ENDOWRIST;DAVINCI SI

## (undated) DEVICE — 2, DISPOSABLE SUCTION/IRRIGATOR WITHOUT DISPOSABLE TIP: Brand: STRYKEFLOW

## (undated) DEVICE — SOL H2O 1000ML BTL

## (undated) DEVICE — GYN LAP/ROBOTIC: Brand: MEDLINE INDUSTRIES, INC.

## (undated) DEVICE — 40580 - THE PINK PAD - ADVANCED TRENDELENBURG POSITIONING KIT: Brand: 40580 - THE PINK PAD - ADVANCED TRENDELENBURG POSITIONING KIT

## (undated) NOTE — LETTER
Date & Time: 4/20/2020, 9:45 PM  Patient: Estrella Sheets  Encounter Provider(s):    Isidro Narvaez MD         This certifies that I, Mackenzieprerna Sheets, a patient at an [facility] facility, am leaving the facility voluntarily and against

## (undated) NOTE — LETTER
Date: 1/17/2025    Patient Name: Brigitte Gregory          To Whom it may concern:    Patient is under my care, due to her health issues please accommodate her to attend her in service virtually , 01/22/2025 through 01/25/2025 from 8am-5pm.    Please do not hesitate to contact my office with questions    Sincerely,      Nicolette Hernandez MD

## (undated) NOTE — LETTER
06/15/20        [facility] [address]  [address] [place] [address]      Dear [facility],    [address] records indicate that you have outstanding lab work and or testing that was ordered for you and has not yet been completed:  Orders Placed This Encounter

## (undated) NOTE — Clinical Note
Has rapidly growing uterine mass, needs mri to rule out sarcoma, mri order entered.  I am unsure if this is the correct one ordered

## (undated) NOTE — LETTER
Date: 4/8/2025    Patient Name: Brigitte Gregory          To Whom it may concern:    Brigitte Gregory is under my care.    This patient should be excused from attending the graduation ceremony due to her current medical condition.    Please do not hesitate to contact my office with concerns.        Sincerely,      Nicolette Hernandez MD

## (undated) NOTE — LETTER
01/18/21        [facility] [address]  [address] [place] [address]      Dear Dena Fuentes records indicate that you have outstanding lab work and or testing that was ordered for you and has not yet been completed:  Orders Placed This Encounter

## (undated) NOTE — LETTER
Date & Time: 8/22/2022, 12:46 PM  Patient: Pio Deleon  Encounter Provider(s):    LA Macdonald       To Whom It May Concern:    Pio Deleon was seen and treated in our department on 8/22/2022. She can return to work.     If you have any questions or concerns, please do not hesitate to call.        _____________________________  Physician/APC Signature

## (undated) NOTE — LETTER
April 30, 2020    Patient: Korin Prabhakar   YOB: 1975     Dear Employer,  At [place] [address], we are taking special precautions and doing everything we can to prevent the spread of COVID-19 (coronavirus disease 2019).  During this it is at all feasible for them to do so. COVID-19 is especially risky for high-risk patients (including, but not limited to, age over 61, immunosuppressed status due to disease or medication, chronic respiratory or heart conditions and diabetes). ?  During

## (undated) NOTE — LETTER
08/24/20        Destini Gregory  [address] [place] [address]      Dear Destini Rojas,    [address] [facility] records indicate that you have outstanding lab work and or testing that was ordered for you and has not yet been completed:  Orders Placed This Encounter

## (undated) NOTE — LETTER
Date: 1/17/2019    Patient Name: Devin Marcos          To Whom it may concern: The above-named patient was seen at the [facility] for treatment of a gastrointestinal condition.     This patient should be excused from attending work/[facility]

## (undated) NOTE — Clinical Note
Dear Dr. Wakefield Rounds you for referring Ksenia Orellana to the [facility] in [place]. Moe Jones NP

## (undated) NOTE — LETTER
Date: 7/30/2021    Patient Name: Khoa Dowell  YOB: 1975    To Whom it may concern:     This letter has been written at the patient's request. The above patient was seen at the [facility] for follow-up and treatment of a medi

## (undated) NOTE — LETTER
Patient Name: Kristie Luciano  : 1975  MRN: [id number]  Patient Address: [address] [facility] [address]      Coronavirus Disease 2019 (COVID-19)     [facility] is committed to the safety and well-being of our patients, mem carefully. If your symptoms get worse, call your healthcare provider immediately. 3. Get rest and stay hydrated.    4. If you have a medical appointment, call the healthcare provider ahead of time and tell them that you have or may have COVID-19.  5. For m of fever-reducing medications; and  · Improvement in respiratory symptoms (e.g., cough, shortness of breath); and  · At least 10 days have passed since symptoms first appeared OR if asymptomatic patient or date of symptom onset is unclear then use 10 days donors must:    · Have had a confirmed diagnosis of COVID-19  · Be symptom-free for at least 14 days*    *Some people will be required to have a repeat COVID-19 test in order to be eligible to donate.  If you’re instructed by [facility] that a repeat test is r random. Researchers are trying to identify similarities between people with a Post-COVID condition to better understand if there are risk factors. How do I prevent a Post-COVID condition?   The best way to prevent the long-term symptoms of COVID-19 is

## (undated) NOTE — LETTER
10/18/17        Brigitte Gregory  [address] [place] [address]      Dear Lorin Jeter,    [address] [facility] records indicate that you have outstanding lab work and or testing that was ordered for you and has not yet been completed:          Comp Metabolic Pane

## (undated) NOTE — LETTER
11/20/17        Brigitte Gregory  [address] [place] [address]      Dear Eleni Hamilton,    [address] [facility] records indicate that you have outstanding lab work and or testing that was ordered for you and has not yet been completed:          Hepatic Function Pa

## (undated) NOTE — LETTER
Date: 7/1/2025    Patient Name: Brigitte Gregory          To Whom it may concern:    The above patient was seen at [facility] for treatment of a medical condition.    I would recommend avoidance of high impact activities including tennis which can exacerbate her condition.    Please contact me if you have any additional questions or concerns.      Sincerely,    Kamron Bernardo MD

## (undated) NOTE — LETTER
[address] [facility], [address] [place] [address] [phone]            June 30, 2020      [address] [place] [address]      Dear Ms. Lorena Holm

## (undated) NOTE — LETTER
Date: 8/23/2022    Patient Name: Ankur Browning          To Whom it may concern:    Ankur Browning is under my care and was seen in office for follow-up of an acute medical condition. Please excuse patient from work 8/19/2022 through 8/29/2022. Patient is return to work 8/29/2022 if she is feeling better. Please contact my office if you have any questions or concerns.       Sincerely,      Donn Zhang MD

## (undated) NOTE — LETTER
02/26/19        [facility] [address]  [id number] [place] [address]      Dear [facility],    [address] [facility] records indicate that you have outstanding lab work and or testing that was ordered for you and has not yet been completed:  Orders Placed This Encounter

## (undated) NOTE — LETTER
Date: 4/26/2019    Patient Name: Maya Aguilar          To Whom it may concern: The above patient is under my care for treatment of a chronic health condition.      This patient should be excused from attending graduation ceremony due to unpredictable

## (undated) NOTE — LETTER
Date: 3/8/2021    Patient Name: Aiden Hinton          To Whom it may concern: The above patient was seen at the [facility] for treatment of a medical condition.  She is undergoing evaluation for several problems, and had a more acute exa

## (undated) NOTE — LETTER
17      Brigitte Gregory  :  1975      To Whom It May Concern: This patient was seen in our office on 17 for a medical condition. I recommend that she is to remain off work for the next week.     Status per above effective

## (undated) NOTE — LETTER
[facility] Testing Department  Phone: [phone]  Right Fax: [phone]  EVALUATION REQUEST PREOP    Sent By:  Evy Eason RN Date: 8/8/19    Patient Name: Twinelver Alejandro  Surgery Date: 8/13/2019    CSN: [id number]  Medical Record: [id number]

## (undated) NOTE — ED AVS SNAPSHOT
Khoa Dowell   MRN: [id number]    Department:  [facility] Emergency Department   Date of Visit:  9/11/2017           Disclosure     Insurance plans vary and the physician(s) referred by the ER may not be covered by your plan.  Please contact you If you have been prescribed any medication(s), please fill your prescription right away and begin taking the medication(s) as directed    If the emergency physician has read X-rays, these will be re-interpreted by a radiologist.  If there is a significant

## (undated) NOTE — MR AVS SNAPSHOT
Marlon Ty [address] [facility] [address] [phone]               Thank you for choosing us for your health care visit with Luis Daniel Ramirez MD.  We are glad to serve you and happy to provide you with this or neurologic symptoms (trouble walking, talking, or one-sided numbness or coordination problems).  If you run frequent 150s-160s/90s-100s you need another visit to discuss medication changes, but it's not an emergency that requires immediate care or ER vis [email or web address]. If you've recently had a stay at the Hospital you can access your discharge instructions in [facility] by going to Visits < Admission Summaries.  If you've been to the Emergency Department or your doctor's office, you can view yo

## (undated) NOTE — LETTER
[facility] Testing Department  Phone: [phone]  Right Fax: [phone]     Sent By:  Patricia Wilcox RN Date: 7/22/19    Patient Name: Gia Ortega  Surgery Date: 8/13/2019    CSN: [id number]  Medical Record: Rev 2/12/14

## (undated) NOTE — LETTER
03/03/21        Shamir Gregory  [id number] [facility] [address]      Dear Shamir hopkins,    [address] [place] [address] records indicate that you have outstanding lab work and or testing that was ordered for you and has not yet been completed:  Orders Placed This Encounter

## (undated) NOTE — LETTER
03/26/18        [facility]  [address] [place] [address]      Dear [facility],    [address] [facility] records indicate that you have outstanding lab work and or testing that was ordered for you and has not yet been completed:          CBC With [facility]

## (undated) NOTE — LETTER
[facility] Testing Department  Phone: [phone]  OUTSIDE TESTING RESULT REQUEST      TO:   Shauna Today's Date: 7/22/19    FAX #: [phone]     IMPORTANT: FOR YOUR IMMEDIATE ATTENTION  Please FAX all test results listed below to: [phone]